# Patient Record
Sex: FEMALE | Race: WHITE | NOT HISPANIC OR LATINO | Employment: PART TIME | URBAN - NONMETROPOLITAN AREA
[De-identification: names, ages, dates, MRNs, and addresses within clinical notes are randomized per-mention and may not be internally consistent; named-entity substitution may affect disease eponyms.]

---

## 2017-08-10 ENCOUNTER — ALLSCRIPTS OFFICE VISIT (OUTPATIENT)
Dept: FAMILY MEDICINE CLINIC | Facility: CLINIC | Age: 27
End: 2017-08-10

## 2017-08-10 PROCEDURE — T1015 CLINIC SERVICE: HCPCS | Performed by: FAMILY MEDICINE

## 2018-01-14 VITALS
WEIGHT: 237 LBS | HEIGHT: 65 IN | DIASTOLIC BLOOD PRESSURE: 78 MMHG | RESPIRATION RATE: 16 BRPM | OXYGEN SATURATION: 99 % | HEART RATE: 86 BPM | TEMPERATURE: 96.7 F | SYSTOLIC BLOOD PRESSURE: 120 MMHG | BODY MASS INDEX: 39.49 KG/M2

## 2018-05-20 ENCOUNTER — OFFICE VISIT (OUTPATIENT)
Dept: URGENT CARE | Facility: CLINIC | Age: 28
End: 2018-05-20

## 2018-05-20 VITALS
RESPIRATION RATE: 18 BRPM | HEART RATE: 81 BPM | DIASTOLIC BLOOD PRESSURE: 93 MMHG | OXYGEN SATURATION: 100 % | TEMPERATURE: 97.8 F | SYSTOLIC BLOOD PRESSURE: 161 MMHG

## 2018-05-20 DIAGNOSIS — F41.9 ANXIETY: ICD-10-CM

## 2018-05-20 DIAGNOSIS — R20.2 PARESTHESIA OF RIGHT ARM: Primary | ICD-10-CM

## 2018-05-20 PROCEDURE — 99213 OFFICE O/P EST LOW 20 MIN: CPT | Performed by: FAMILY MEDICINE

## 2018-05-20 NOTE — PATIENT INSTRUCTIONS
The patient was given a referral to her primary care doctor  No was given to him also on the behalf of the patient    Patient will see primary care regarding her anxiety and also to review the x-rays done at the local hospital

## 2018-05-20 NOTE — PROGRESS NOTES
[facility] - Patient Visit Note  Neelima Rosen 32 y o  female MRN: [id number]      Assessment / Plan:  Paresthesia of right arm [R20 2]  1  Paresthesia of right arm     2  Anxiety       Reason For Visit / Chief Complaint  Chief Complaint   Patient presents with    Numbness     right arm numbness    [facility] Discussion:  Patient is referred back to her family doctor regarding her questions about her lumbar x-rays and having anxiety and panic attacks  HPI:  Neelima Rosen is a 32 y o  female Patient           Who  Presents with a history of having been in her vehicle at which time she developed numbness along the ulnar nerve distribution of her right upper extremity  Once she was able to move the arm freely the paresthesias went away and had not returned since that time  During this episode she felt that she was having a heart attack and became so anxious that she hyperventilated and had a feeling throughout her whole body as though she was covered with "menthol"  She also had questions about a lumbar x-ray that was performed when she was seen at the local hospital for back pain and was told by the practitioner that she had plaquing in her arteries  It was explained to her that some plaquing occurs even at birth and that she should discuss this with her primary care doctor who can in fact over the 6 raise with her  The patient was also instructed that she is probably having a high anxiety level over many things  Historical Information   No past medical history on file  No past surgical history on file  Social History   History   Alcohol use Not on file     History   Drug use: Unknown     History   Smoking Status    Not on file   Smokeless Tobacco    Not on file     No family history on file        ALLERGIES:       No Known Allergies    MEDS:  No current outpatient prescriptions on file      FACILITY ADMINISTERED MEDS:        REVIEW OF SYSTEMS    GENERAL: NEGATIVE for:  Generalized Fatigue                             Chills                              Fever                             Myalgias     OPTHALMIC: NEGATIVE for:  Diplopia                            Scotomata                            Visual Changes                            Blurred Vision     ENT:  EARS NEGATIVE for:  Hearing Difficulty                            Tinnitus                            Vertigo                            Dizziness                            Ear Pain                            Ear Drainage               NOSE NEGATIVE for:  Nasal Congestion                            Nasal Discharge                            Sinus Pain / Pressure               THROAT NEGATIVE for:  Sore Throat / Throat Pain                            Difficulty Swallowing     RESPIRATORY: NEGATIVE for:  Cough                            Wheezing                            Sputum Production                            Sob / Tachypnea                            Hemoptysis     CARDIOVASCULAR: NEGATIVE for:  Chest Pain                             SOB (cardiac Related)                             Dyspnea on Exertion                             Orthopnea                             PND                             Leg Edema                             Palpitations                               Irregularities/rythym             ABDOMINAL/GI:          CURRENT VITALS:   Blood Pressure: 161/93 (05/20/18 1124)  Pulse: 81 (05/20/18 1124)  Temperature: 97 8 °F (36 6 °C) (05/20/18 1124)  Respirations: 18 (05/20/18 1124)  SpO2: 100 % (05/20/18 1124)  /93   Pulse 81   Temp 97 8 °F (36 6 °C)   Resp 18   SpO2 100%       PHYSICAL EXAM:         General Appearance:    Alert, cooperative, no apparent distress, appears stated age     Oriented x3    Head:    Normocephalic, without obvious abnormality, atraumatic   Eyes:      EOM's intact,      LAUREL,        conjunctiva/corneas clear,          fundi not visualized well   Ears:     Normal external ear canals     Tm right side  Normal     Tm left side    Normal       Nose:   Nares normal externally, septum midline,     mucosa normal,     No anterior drainage         Sinuses   with out   tenderness to palpation / percussion     Throat:   Lips, mucosa, and tongue normal       Anterior pharynx   Normal      Posterior pharynx   Normal      No exudate obvious       Neck:   Supple, symmetrical, trachea midline and moveable    Normal thyroid click present    No carotid bruits appreciated        Lymphatics:     Adenopathy in anterior cervical chain  Normal    Adenopathy in posterior cervical chain   Normal     Lungs:     Clear to auscultation bilaterally    No rales    No ronchi    No wheeze     Heart[de-identified]    Regular rate and rhythm, S1 and S2 normal,     No S3, S4, audible    No murmurs, rubs      Extremities:     Extremities grossly normal     atraumatic,     no cyanosis or edema  I could not produce paresthesias by pressure over the ulnar or radial nerve at this time  Tinnel"s sign was negative  Skin:     Skin color, texture, turgor normal, no rashes or lesions               Neurologic   CNII-XII intact,     Motor strength in upper and lower ext  Normal and symmetrical ,     Gross sensory intact          Follow up at primary care in 2  days    Counseling / Coordination of Care  Total clinic time spent today  15  minutes  Greater than 50% of total time was spent with the patient and / or family counseling and / or coordination of care  Portions of the record may have been created with voice recognition software   Occasional wrong word or "sound a like" substitutions may have occurred due to the inherent limitations of voice recognition software   Read the chart carefully and recognize, using context, where substitutions have occurred

## 2018-07-10 ENCOUNTER — HOSPITAL ENCOUNTER (EMERGENCY)
Facility: HOSPITAL | Age: 28
Discharge: HOME | End: 2018-07-10
Attending: EMERGENCY MEDICINE | Admitting: EMERGENCY MEDICINE

## 2018-07-10 VITALS
RESPIRATION RATE: 16 BRPM | TEMPERATURE: 98.4 F | BODY MASS INDEX: 40.18 KG/M2 | OXYGEN SATURATION: 98 % | WEIGHT: 250 LBS | HEART RATE: 78 BPM | HEIGHT: 66 IN | DIASTOLIC BLOOD PRESSURE: 80 MMHG | SYSTOLIC BLOOD PRESSURE: 128 MMHG

## 2018-07-10 DIAGNOSIS — R11.0 NAUSEA: Primary | ICD-10-CM

## 2018-07-10 DIAGNOSIS — R19.7 DIARRHEA, UNSPECIFIED TYPE: ICD-10-CM

## 2018-07-10 LAB
ALBUMIN SERPL BCP-MCNC: 4.7 G/DL (ref 3.5–5.7)
ALP SERPL-CCNC: 43 U/L (ref 40–150)
ALT SERPL W P-5'-P-CCNC: 16 U/L (ref 7–52)
ANION GAP SERPL CALCULATED.3IONS-SCNC: 6 MMOL/L (ref 4–13)
AST SERPL W P-5'-P-CCNC: 16 U/L (ref 13–39)
ATRIAL RATE: 63 BPM
BASOPHILS # BLD AUTO: 0 THOUSANDS/ΜL (ref 0–0.1)
BASOPHILS NFR BLD AUTO: 0 % (ref 0–2)
BILIRUB SERPL-MCNC: 0.3 MG/DL (ref 0.2–1)
BILIRUB UR QL STRIP: NEGATIVE
BUN SERPL-MCNC: 12 MG/DL (ref 7–25)
CALCIUM SERPL-MCNC: 10 MG/DL (ref 8.6–10.5)
CHLORIDE SERPL-SCNC: 103 MMOL/L (ref 98–107)
CLARITY UR: ABNORMAL
CO2 SERPL-SCNC: 30 MMOL/L (ref 21–31)
COLOR UR: YELLOW
CREAT SERPL-MCNC: 0.83 MG/DL (ref 0.6–1.2)
EOSINOPHIL # BLD AUTO: 0.1 THOUSAND/ΜL (ref 0–0.61)
EOSINOPHIL NFR BLD AUTO: 1 % (ref 0–5)
ERYTHROCYTE [DISTWIDTH] IN BLOOD BY AUTOMATED COUNT: 12.9 % (ref 11.5–14.5)
EXT PREG TEST URINE: NORMAL
GFR SERPL CREATININE-BSD FRML MDRD: 96 ML/MIN/1.73SQ M
GLUCOSE SERPL-MCNC: 106 MG/DL (ref 65–99)
GLUCOSE UR STRIP-MCNC: NEGATIVE MG/DL
HCT VFR BLD AUTO: 39.3 % (ref 34.8–46.1)
HGB BLD-MCNC: 13.5 G/DL (ref 12–16)
HGB UR QL STRIP.AUTO: NEGATIVE
KETONES UR STRIP-MCNC: NEGATIVE MG/DL
LEUKOCYTE ESTERASE UR QL STRIP: NEGATIVE
LIPASE SERPL-CCNC: 13 U/L (ref 11–82)
LYMPHOCYTES # BLD AUTO: 1.3 THOUSANDS/ΜL (ref 0.6–4.47)
LYMPHOCYTES NFR BLD AUTO: 25 % (ref 21–51)
MCH RBC QN AUTO: 30 PG (ref 26–34)
MCHC RBC AUTO-ENTMCNC: 34.3 G/DL (ref 31–37)
MCV RBC AUTO: 87 FL (ref 81–99)
MONOCYTES # BLD AUTO: 0.4 THOUSAND/ΜL (ref 0.17–1.22)
MONOCYTES NFR BLD AUTO: 7 % (ref 2–12)
NEUTROPHILS # BLD AUTO: 3.6 THOUSANDS/ΜL (ref 1.4–6.5)
NEUTS SEG NFR BLD AUTO: 67 % (ref 42–75)
NITRITE UR QL STRIP: NEGATIVE
NRBC BLD AUTO-RTO: 0 /100 WBCS
P AXIS: 32 DEGREES
PH UR STRIP.AUTO: 6.5 [PH] (ref 5–8)
PLATELET # BLD AUTO: 280 THOUSANDS/UL (ref 149–390)
PMV BLD AUTO: 7 FL (ref 8.6–11.7)
POTASSIUM SERPL-SCNC: 4 MMOL/L (ref 3.5–5.5)
PR INTERVAL: 152 MS
PROT SERPL-MCNC: 7.6 G/DL (ref 6.4–8.9)
PROT UR STRIP-MCNC: NEGATIVE MG/DL
QRS AXIS: 40 DEGREES
QRSD INTERVAL: 76 MS
QT INTERVAL: 394 MS
QTC INTERVAL: 403 MS
RBC # BLD AUTO: 4.49 MILLION/UL (ref 3.9–5.2)
SODIUM SERPL-SCNC: 139 MMOL/L (ref 134–143)
SP GR UR STRIP.AUTO: 1.02 (ref 1–1.03)
T WAVE AXIS: 41 DEGREES
TROPONIN I SERPL-MCNC: <0.03 NG/ML
UROBILINOGEN UR QL STRIP.AUTO: 0.2 E.U./DL
VENTRICULAR RATE: 63 BPM
WBC # BLD AUTO: 5.4 THOUSAND/UL (ref 4.8–10.8)

## 2018-07-10 PROCEDURE — 81025 URINE PREGNANCY TEST: CPT | Performed by: EMERGENCY MEDICINE

## 2018-07-10 PROCEDURE — 85025 COMPLETE CBC W/AUTO DIFF WBC: CPT | Performed by: EMERGENCY MEDICINE

## 2018-07-10 PROCEDURE — 99285 EMERGENCY DEPT VISIT HI MDM: CPT

## 2018-07-10 PROCEDURE — 93005 ELECTROCARDIOGRAM TRACING: CPT

## 2018-07-10 PROCEDURE — 96374 THER/PROPH/DIAG INJ IV PUSH: CPT

## 2018-07-10 PROCEDURE — 81003 URINALYSIS AUTO W/O SCOPE: CPT | Performed by: EMERGENCY MEDICINE

## 2018-07-10 PROCEDURE — 96376 TX/PRO/DX INJ SAME DRUG ADON: CPT

## 2018-07-10 PROCEDURE — 80053 COMPREHEN METABOLIC PANEL: CPT | Performed by: EMERGENCY MEDICINE

## 2018-07-10 PROCEDURE — 96375 TX/PRO/DX INJ NEW DRUG ADDON: CPT

## 2018-07-10 PROCEDURE — 36415 COLL VENOUS BLD VENIPUNCTURE: CPT | Performed by: EMERGENCY MEDICINE

## 2018-07-10 PROCEDURE — 84484 ASSAY OF TROPONIN QUANT: CPT | Performed by: EMERGENCY MEDICINE

## 2018-07-10 PROCEDURE — 83690 ASSAY OF LIPASE: CPT | Performed by: EMERGENCY MEDICINE

## 2018-07-10 RX ORDER — ONDANSETRON 2 MG/ML
4 INJECTION INTRAMUSCULAR; INTRAVENOUS ONCE
Status: COMPLETED | OUTPATIENT
Start: 2018-07-10 | End: 2018-07-10

## 2018-07-10 RX ORDER — ACETAMINOPHEN 325 MG/1
650 TABLET ORAL ONCE
Status: COMPLETED | OUTPATIENT
Start: 2018-07-10 | End: 2018-07-10

## 2018-07-10 RX ORDER — ONDANSETRON 4 MG/1
4 TABLET, FILM COATED ORAL EVERY 8 HOURS PRN
Qty: 10 TABLET | Refills: 0 | Status: SHIPPED | OUTPATIENT
Start: 2018-07-10 | End: 2018-11-15

## 2018-07-10 RX ADMIN — ONDANSETRON 4 MG: 2 INJECTION INTRAMUSCULAR; INTRAVENOUS at 10:58

## 2018-07-10 RX ADMIN — SODIUM CHLORIDE 1000 ML: 0.9 INJECTION, SOLUTION INTRAVENOUS at 13:20

## 2018-07-10 RX ADMIN — ACETAMINOPHEN 650 MG: 325 TABLET ORAL at 14:24

## 2018-07-10 RX ADMIN — SODIUM CHLORIDE 1000 ML: 0.9 INJECTION, SOLUTION INTRAVENOUS at 10:59

## 2018-07-10 RX ADMIN — ONDANSETRON 4 MG: 2 INJECTION INTRAMUSCULAR; INTRAVENOUS at 13:20

## 2018-07-10 NOTE — DISCHARGE INSTRUCTIONS
Acute Diarrhea   WHAT YOU NEED TO KNOW:   Acute diarrhea starts quickly and lasts a short time, usually 1 to 3 days  It can last up to 2 weeks  You may not be able to control your diarrhea  Acute diarrhea usually stops on its own  DISCHARGE INSTRUCTIONS:   Return to the emergency department if:   · You feel confused  · Your heartbeat is faster than normal      · Your eyes look deeply sunken, or you have no tears when you cry  · You urinate less than usual, or your urine is dark yellow  · You have blood or mucus in your stools  · You have severe abdominal pain  · You are unable to drink any liquids  Contact your healthcare provider if:   · Your symptoms do not get better with treatment  · You have a fever higher than 101 3°F (38 5°C)  · You have trouble eating and drinking because you are vomiting  · You are thirsty or have a dry mouth  · Your diarrhea does not get better in 7 days  · You have questions or concerns about your condition or care  Follow up with your healthcare provider as directed:  Write down your questions so you remember to ask them during your visits  Medicines:  · Diarrhea medicine  is an over-the-counter medicine that helps slow or stop your diarrhea  If you take other medicines, talk to your healthcare provider before you take diarrhea medicine  · Antibiotics  may be given to help treat an infection caused by bacteria  · Antiparasitics  may be given to treat an infection caused by parasites  · Take your medicine as directed  Contact your healthcare provider if you think your medicine is not helping or if you have side effects  Tell him of her if you are allergic to any medicine  Keep a list of the medicines, vitamins, and herbs you take  Include the amounts, and when and why you take them  Bring the list or the pill bottles to follow-up visits  Carry your medicine list with you in case of an emergency    Self-care:   · Drink liquids as directed  Liquids will help prevent dehydration caused by diarrhea  Ask your healthcare provider how much liquid to drink each day and which liquids are best for you  You may need to drink an oral rehydration solution (ORS)  An ORS has the right amounts of water, salts, and sugar you need to replace body fluids  You can buy an ORS at most grocery stores and pharmacies  · Eat foods that are easy to digest   Examples include rice, lentils, cereal, bananas, potatoes, and bread  It also includes some fruits (bananas, melon), well-cooked vegetables, and lean meats  Avoid foods high in fiber, fat, and sugar  Also avoid caffeine, alcohol, dairy, and red meat until your diarrhea is gone  Prevent acute diarrhea:   · Wash your hands often  Use soap and water  Wash your hands before you eat or prepare food  Also wash your hands after you use the bathroom  Use an alcohol-based hand gel when soap and water are not available  · Keep bathroom surfaces clean  This helps prevent the spread of germs that cause acute diarrhea  · Wash fruits and vegetables well before you eat them  This can help remove germs that cause diarrhea  If possible, remove the skin from fruits and vegetables, or cook them well before you eat them  · Cook meat as directed  ¨ Cook ground meat  to 160°F      ¨ Cook ground poultry, whole poultry, or cuts of poultry  to at least 165°F  Remove the meat from heat  Let it stand for 3 minutes before you eat it  ¨ Cook whole cuts of meat other than poultry  to at least 145°F  Remove the meat from heat  Let it stand for 3 minutes before you eat it  · Wash dishes that have touched raw meat with hot water and soap  This includes cutting boards, utensils, dishes, and serving containers  · Place raw or cooked meat in the refrigerator as soon as possible  Bacteria can grow in meat that is left at room temperature too long  · Do not eat raw or undercooked oysters, clams, or mussels  These foods may be contaminated and cause infection  · Drink filtered or treated water only when you travel  Do not put ice in your drinks  Drink bottled water whenever possible  © 2017 [employer]  Information is for End User's use only and may not be sold, redistributed or otherwise used for commercial purposes  All illustrations and images included in CareNotes® are the copyrighted property of [employer]  or [employer]  The above information is an  only  It is not intended as medical advice for individual conditions or treatments  Talk to your doctor, nurse or pharmacist before following any medical regimen to see if it is safe and effective for you  Acute Nausea and Vomiting   WHAT YOU NEED TO KNOW:   Acute nausea and vomiting start suddenly, worsen quickly, and last a short time  DISCHARGE INSTRUCTIONS:   Return to the emergency department if:   · You see blood in your vomit or your bowel movements  · You have sudden, severe pain in your chest and upper abdomen after hard vomiting or retching  · You have swelling in your neck and chest      · You are dizzy, cold, and thirsty and your eyes and mouth are dry  · You are urinating very little or not at all  · You have muscle weakness, leg cramps, and trouble breathing  · Your heart is beating much faster than normal      · You continue to vomit for more than 48 hours  Contact your healthcare provider if:   · You have frequent dry heaves (vomiting but nothing comes out)  · Your nausea and vomiting does not get better or go away after you use medicine  · You have questions or concerns about your condition or treatment  Medicines: You may need any of the following:  · Medicines  may be given to calm your stomach and stop your vomiting  You may also need medicines to help you feel more relaxed or to stop nausea and vomiting caused by motion sickness      · Gastrointestinal stimulants  are used to help empty your stomach and bowels  This may help decrease nausea and vomiting  · Take your medicine as directed  Contact your healthcare provider if you think your medicine is not helping or if you have side effects  Tell him or her if you are allergic to any medicine  Keep a list of the medicines, vitamins, and herbs you take  Include the amounts, and when and why you take them  Bring the list or the pill bottles to follow-up visits  Carry your medicine list with you in case of an emergency  Prevent or manage acute nausea and vomiting:   · Do not drink alcohol  Alcohol may upset or irritate your stomach  Too much alcohol can also cause acute nausea and vomiting  · Control stress  Headaches due to stress may cause nausea and vomiting  Find ways to relax and manage your stress  Get more rest and sleep  · Drink more liquids as directed  Vomiting can lead to dehydration  It is important to drink more liquids to help replace lost body fluids  Ask your healthcare provider how much liquid to drink each day and which liquids are best for you  Your provider may recommend that you drink an oral rehydration solution (ORS)  ORS contains water, salts, and sugar that are needed to replace the lost body fluids  Ask what kind of ORS to use, how much to drink, and where to get it  · Eat smaller meals, more often  Eat small amounts of food every 2 to 3 hours, even if you are not hungry  Food in your stomach may decrease your nausea  · Talk to your healthcare provider before you take over-the-counter (OTC) medicines  These medicines can cause serious problems if you use certain other medicines, or you have a medical condition  You may have problems if you use too much or use them for longer than the label says  Follow directions on the label carefully  Follow up with your healthcare provider as directed:  Write down your questions so you remember to ask them during your follow-up visits    © 2017 [facility] [address] is for End User's use only and may not be sold, redistributed or otherwise used for commercial purposes  All illustrations and images included in CareNotes® are the copyrighted property of [employer]  or [employer]  The above information is an  only  It is not intended as medical advice for individual conditions or treatments  Talk to your doctor, nurse or pharmacist before following any medical regimen to see if it is safe and effective for you

## 2018-07-10 NOTE — ED PROVIDER NOTES
History  Chief Complaint   Patient presents with    Weakness - Generalized    Anorexia    Diarrhea     29 yr female with c/o progressive generalized weakness x4 days with constant nausea and diarrhea 4-5 times today  No emesis but unable take adequate p o  intake  No fever chills  No melena or hematochezia  No cough or congestion, no sore throat  No chest pain or shortness of breath  No dysuria  Patient also states she had intermittent palpitations        History provided by:  Patient  Diarrhea   Quality:  Watery  Severity:  Moderate  Onset quality:  Sudden  Duration:  4 days  Timing:  Intermittent  Progression:  Unchanged  Relieved by:  Nothing  Worsened by:  Nothing  Associated symptoms: no abdominal pain, no chills, no recent cough, no fever, no headaches, no myalgias, no URI and no vomiting    Risk factors: no recent antibiotic use, no sick contacts and no travel to endemic areas        None       Past Medical History:   Diagnosis Date    Anemia     Post partum depression        Past Surgical History:   Procedure Laterality Date    APPENDECTOMY       SECTION       SECTION         Family History   Problem Relation Age of Onset    Barnsdall's disease Family      I have reviewed and agree with the history as documented  Social History   Substance Use Topics    Smoking status: Never Smoker    Smokeless tobacco: Never Used    Alcohol use No        Review of Systems   Constitutional: Negative for chills and fever  HENT: Negative for rhinorrhea and sore throat  Eyes: Negative for visual disturbance  Respiratory: Negative for cough and shortness of breath  Cardiovascular: Negative for chest pain and leg swelling  Gastrointestinal: Positive for diarrhea and nausea  Negative for abdominal pain and vomiting  Genitourinary: Negative for dysuria and frequency  Musculoskeletal: Negative for back pain and myalgias  Skin: Negative for rash     Neurological: Positive for weakness (Generalized)  Negative for dizziness and headaches  Psychiatric/Behavioral: Negative for confusion  All other systems reviewed and are negative        Physical Exam  Physical Exam    Vital Signs  ED Triage Vitals [07/10/18 0955]   Temperature Pulse Respirations Blood Pressure SpO2   98 1 °F (36 7 °C) 99 18 149/88 94 %      Temp Source Heart Rate Source Patient Position - Orthostatic VS BP Location FiO2 (%)   Temporal Monitor Sitting Left arm --      Pain Score       5           Vitals:    07/10/18 1041 07/10/18 1045 07/10/18 1425 07/10/18 1523   BP: 126/84 124/82 121/75 128/80   Pulse: 84  79 78   Patient Position - Orthostatic VS: Standing - Orthostatic VS  Sitting        Visual Acuity      ED Medications  Medications   sodium chloride 0 9 % bolus 1,000 mL (0 mL Intravenous Stopped 7/10/18 1159)   ondansetron (ZOFRAN) injection 4 mg (4 mg Intravenous Given 7/10/18 1058)   ondansetron (ZOFRAN) injection 4 mg (4 mg Intravenous Given 7/10/18 1320)   sodium chloride 0 9 % bolus 1,000 mL (0 mL Intravenous Stopped 7/10/18 1532)   acetaminophen (TYLENOL) tablet 650 mg (650 mg Oral Given 7/10/18 1424)       Diagnostic Studies  Results Reviewed     Procedure Component Value Units Date/Time    Troponin I [[id number]]  (Normal) Collected:  07/10/18 1050    Lab Status:  Final result Specimen:  Blood from Arm, Left Updated:  07/10/18 1121     Troponin I <0 03 ng/mL     Comprehensive metabolic panel [[id number]]  (Abnormal) Collected:  07/10/18 1050    Lab Status:  Final result Specimen:  Blood from Arm, Left Updated:  07/10/18 1117     Sodium 139 mmol/L      Potassium 4 0 mmol/L      Chloride 103 mmol/L      CO2 30 mmol/L      Anion Gap 6 mmol/L      BUN 12 mg/dL      Creatinine 0 83 mg/dL      Glucose 106 (H) mg/dL      Calcium 10 0 mg/dL      AST 16 U/L      ALT 16 U/L      Alkaline Phosphatase 43 U/L      Total Protein 7 6 g/dL      Albumin 4 7 g/dL      Total Bilirubin 0 30 mg/dL      eGFR 96 ml/min/1 73sq m Narrative:         National Kidney Disease Education Program recommendations are as follows:  GFR calculation is accurate only with a steady state creatinine  Chronic Kidney disease less than 60 ml/min/1 73 sq  meters  Kidney failure less than 15 ml/min/1 73 sq  meters      Lipase [[id number]]  (Normal) Collected:  07/10/18 1050    Lab Status:  Final result Specimen:  Blood from Arm, Left Updated:  07/10/18 1117     Lipase 13 u/L     UA w Reflex to Microscopic w Reflex to Culture [[id number]]  (Abnormal) Collected:  07/10/18 1049    Lab Status:  Final result Specimen:  Urine from Urine, Clean Catch Updated:  07/10/18 1105     Color, UA Yellow     Clarity, UA Slightly Cloudy (A)     Specific Gravity, UA 1 025     pH, UA 6 5     Leukocytes, UA Negative     Nitrite, UA Negative     Protein, UA Negative mg/dl      Glucose, UA Negative mg/dl      Ketones, UA Negative mg/dl      Urobilinogen, UA 0 2 E U /dl      Bilirubin, UA Negative     Blood, UA Negative    CBC and differential [[id number]]  (Abnormal) Collected:  07/10/18 1050    Lab Status:  Final result Specimen:  Blood from Arm, Left Updated:  07/10/18 1104     WBC 5 40 Thousand/uL      RBC 4 49 Million/uL      Hemoglobin 13 5 g/dL      Hematocrit 39 3 %      MCV 87 fL      MCH 30 0 pg      MCHC 34 3 g/dL      RDW 12 9 %      MPV 7 0 (L) fL      Platelets 379 Thousands/uL      nRBC 0 /100 WBCs      Neutrophils Relative 67 %      Lymphocytes Relative 25 %      Monocytes Relative 7 %      Eosinophils Relative 1 %      Basophils Relative 0 %      Neutrophils Absolute 3 60 Thousands/µL      Lymphocytes Absolute 1 30 Thousands/µL      Monocytes Absolute 0 40 Thousand/µL      Eosinophils Absolute 0 10 Thousand/µL      Basophils Absolute 0 00 Thousands/µL     POCT pregnancy, urine [[id number]]  (Normal) Resulted:  07/10/18 1045    Lab Status:  Final result Updated:  07/10/18 1050     EXT PREG TEST UR (Ref: Negative) negitive                 No orders to display Procedures  Procedures       Phone Contacts  ED Phone Contact    ED Course  ED Course as of Jul 10 1557   Tue Jul 10, 2018   1107 NSR 65bpm, nl QRS, NSSTTW changes    1420 Pt was nauseous after initial zofran dose and IVF - 2nd dose given  Also given tylenol for new HA while in ED    1440 Pt feels much better  D/c after IVF finish                                MDM  CritCare Time    Disposition  Final diagnoses:   Nausea   Diarrhea, unspecified type     Time reflects when diagnosis was documented in both MDM as applicable and the Disposition within this note     Time User Action Codes Description Comment    7/10/2018  2:41 PM Ricard Moritz A Add [R11 0] Nausea     7/10/2018  2:41 PM Ricard Moritz A Add [R19 7] Diarrhea, unspecified type       ED Disposition     ED Disposition Condition Comment    Discharge  Javi Danielahoda discharge to home/self care  Condition at discharge: Stable        Follow-up Information     Follow up With Specialties Details Why [address], [address] [facility], Nurse Practitioner Schedule an appointment as soon as possible for a visit in 1 day Return if she worsens or any new problems occur  [address]            Discharge Medication List as of 7/10/2018  2:42 PM      START taking these medications    Details   ondansetron (ZOFRAN) 4 mg tablet Take 1 tablet (4 mg total) by mouth every 8 (eight) hours as needed for nausea or vomiting for up to 10 doses, Starting Tue 7/10/2018, Print           No discharge procedures on file      ED Provider  Electronically Signed by           Guillermo Hicks MD  07/10/18 1374

## 2018-07-14 ENCOUNTER — HOSPITAL ENCOUNTER (EMERGENCY)
Facility: HOSPITAL | Age: 28
Discharge: HOME | End: 2018-07-15
Attending: EMERGENCY MEDICINE | Admitting: EMERGENCY MEDICINE

## 2018-07-14 DIAGNOSIS — R10.9 ABDOMINAL PAIN: Primary | ICD-10-CM

## 2018-07-15 VITALS
DIASTOLIC BLOOD PRESSURE: 75 MMHG | TEMPERATURE: 97.8 F | HEART RATE: 88 BPM | RESPIRATION RATE: 16 BRPM | WEIGHT: 250 LBS | BODY MASS INDEX: 40.18 KG/M2 | HEIGHT: 66 IN | OXYGEN SATURATION: 100 % | SYSTOLIC BLOOD PRESSURE: 120 MMHG

## 2018-07-15 PROCEDURE — 99284 EMERGENCY DEPT VISIT MOD MDM: CPT

## 2018-07-15 NOTE — DISCHARGE INSTRUCTIONS

## 2018-07-15 NOTE — ED PROVIDER NOTES
History  Chief Complaint   Patient presents with    Abdominal Pain     Pt seen in this ER on Tues for similar s/x and Dx with "off season viral bug" and states she was told to come back if she wasn't feeling better in a few days  Pt did not f/u with PCP  Pt c/o epigastric pain, LT side pain,loss of appetite and body aches  29 YOF WITH RECENT EVALUATION FOR ABDOMINAL PAIN NOW FOR RE-EVALUATION AFTER ONGOING LOSS OR APPETITE AND URINARY FREQUENCY  THE PATIENT IS UNUSUALLY CONCERNED BECAUSE OF HER LOSS OF APPETITE  NO NVD  NO DYSURIA  NO VAGIANL BLEEDING OR DC   OCC [facility] ABDOMINAL PAIN NO COUGH, SORE THROAT, OR RHINORRHEA            Prior to Admission Medications   Prescriptions Last Dose Informant Patient Reported? Taking?   ondansetron (ZOFRAN) 4 mg tablet   No No   Sig: Take 1 tablet (4 mg total) by mouth every 8 (eight) hours as needed for nausea or vomiting for up to 10 doses      Facility-Administered Medications: None       Past Medical History:   Diagnosis Date    Anemia     Post partum depression        Past Surgical History:   Procedure Laterality Date    APPENDECTOMY       SECTION         Family History   Problem Relation Age of Onset    Fisher's disease Family      I have reviewed and agree with the history as documented  Social History   Substance Use Topics    Smoking status: Former Smoker     Quit date: 2017    Smokeless tobacco: Never Used    Alcohol use No        Review of Systems   Constitutional: Negative for chills and fever  HENT: Negative for ear pain, rhinorrhea and sore throat  Eyes: Negative for pain, redness and visual disturbance  Respiratory: Negative for cough and shortness of breath  Cardiovascular: Negative for chest pain and leg swelling  Gastrointestinal: Positive for abdominal pain  Negative for diarrhea, nausea and vomiting  Genitourinary: Negative for dysuria, flank pain and urgency     Musculoskeletal: Negative for back pain, myalgias and neck pain  Skin: Negative for rash  Neurological: Negative for dizziness, weakness, light-headedness and headaches  Hematological: Negative  Psychiatric/Behavioral: Negative for agitation, confusion and suicidal ideas  The patient is not nervous/anxious  All other systems reviewed and are negative  Physical Exam  Physical Exam   Constitutional: She is oriented to person, place, and time  She appears well-developed and well-nourished  HENT:   Nose: Nose normal    Mouth/Throat: Oropharynx is clear and moist  No oropharyngeal exudate  Eyes: Conjunctivae and EOM are normal  Pupils are equal, round, and reactive to light  No scleral icterus  Neck: Normal range of motion  Neck supple  No JVD present  No tracheal deviation present  Cardiovascular: Normal rate, regular rhythm and normal heart sounds  No murmur heard  Pulmonary/Chest: Effort normal and breath sounds normal  No respiratory distress  She has no wheezes  She has no rales  Abdominal: Soft  Bowel sounds are normal  There is no tenderness  There is no guarding  Musculoskeletal: Normal range of motion  She exhibits no edema or tenderness  Neurological: She is alert and oriented to person, place, and time  No cranial nerve deficit or sensory deficit  She exhibits normal muscle tone  5/5 motor, nl sens   Skin: Skin is warm and dry  Psychiatric: She has a normal mood and affect  Her behavior is normal    Nursing note and vitals reviewed        Vital Signs  ED Triage Vitals [07/14/18 2302]   Temperature Pulse Respirations Blood Pressure SpO2   98 1 °F (36 7 °C) 76 18 123/71 100 %      Temp Source Heart Rate Source Patient Position - Orthostatic VS BP Location FiO2 (%)   Temporal Monitor -- Left arm --      Pain Score       6           Vitals:    07/14/18 2302   BP: 123/71   Pulse: 76       Visual Acuity      ED Medications  Medications - No data to display    Diagnostic Studies  Results Reviewed     None No orders to display              Procedures  Procedures       Phone Contacts  ED Phone Contact    ED Course                               MDM  CritCare Time    Disposition  Final diagnoses:   None     ED Disposition     None      Follow-up Information    None         Patient's Medications   Discharge Prescriptions    No medications on file     No discharge procedures on file      ED Provider  Electronically Signed by           Bibiana Cedillo MD  07/15/18 0343

## 2018-07-30 ENCOUNTER — APPOINTMENT (EMERGENCY)
Dept: CT IMAGING | Facility: HOSPITAL | Age: 28
End: 2018-07-30

## 2018-07-30 ENCOUNTER — OFFICE VISIT (OUTPATIENT)
Dept: FAMILY MEDICINE CLINIC | Facility: CLINIC | Age: 28
End: 2018-07-30

## 2018-07-30 ENCOUNTER — OFFICE VISIT (OUTPATIENT)
Dept: URGENT CARE | Facility: CLINIC | Age: 28
End: 2018-07-30

## 2018-07-30 ENCOUNTER — HOSPITAL ENCOUNTER (EMERGENCY)
Facility: HOSPITAL | Age: 28
Discharge: HOME | End: 2018-07-30
Attending: EMERGENCY MEDICINE

## 2018-07-30 VITALS
TEMPERATURE: 97.6 F | WEIGHT: 240 LBS | HEIGHT: 66 IN | BODY MASS INDEX: 38.57 KG/M2 | DIASTOLIC BLOOD PRESSURE: 79 MMHG | OXYGEN SATURATION: 99 % | HEART RATE: 93 BPM | SYSTOLIC BLOOD PRESSURE: 119 MMHG | RESPIRATION RATE: 16 BRPM

## 2018-07-30 VITALS
DIASTOLIC BLOOD PRESSURE: 74 MMHG | SYSTOLIC BLOOD PRESSURE: 162 MMHG | TEMPERATURE: 98.2 F | BODY MASS INDEX: 38.89 KG/M2 | OXYGEN SATURATION: 99 % | HEIGHT: 66 IN | WEIGHT: 242 LBS | HEART RATE: 73 BPM | RESPIRATION RATE: 18 BRPM

## 2018-07-30 VITALS
DIASTOLIC BLOOD PRESSURE: 71 MMHG | RESPIRATION RATE: 18 BRPM | TEMPERATURE: 98.1 F | OXYGEN SATURATION: 100 % | HEART RATE: 76 BPM | SYSTOLIC BLOOD PRESSURE: 165 MMHG

## 2018-07-30 DIAGNOSIS — K21.9 GASTROESOPHAGEAL REFLUX DISEASE, ESOPHAGITIS PRESENCE NOT SPECIFIED: ICD-10-CM

## 2018-07-30 DIAGNOSIS — M43.6 WRY NECK: ICD-10-CM

## 2018-07-30 DIAGNOSIS — K21.9 GASTROESOPHAGEAL REFLUX DISEASE WITHOUT ESOPHAGITIS: ICD-10-CM

## 2018-07-30 DIAGNOSIS — R59.0 SUPRACLAVICULAR ADENOPATHY: Primary | ICD-10-CM

## 2018-07-30 DIAGNOSIS — M54.2 NECK PAIN ON RIGHT SIDE: Primary | ICD-10-CM

## 2018-07-30 LAB
ALBUMIN SERPL BCP-MCNC: 4.4 G/DL (ref 3.5–5.7)
ALP SERPL-CCNC: 37 U/L (ref 40–150)
ALT SERPL W P-5'-P-CCNC: 12 U/L (ref 7–52)
ANION GAP SERPL CALCULATED.3IONS-SCNC: 8 MMOL/L (ref 4–13)
AST SERPL W P-5'-P-CCNC: 13 U/L (ref 13–39)
BASOPHILS # BLD AUTO: 0 THOUSANDS/ΜL (ref 0–0.1)
BASOPHILS NFR BLD AUTO: 1 % (ref 0–2)
BILIRUB SERPL-MCNC: 0.3 MG/DL (ref 0.2–1)
BUN SERPL-MCNC: 13 MG/DL (ref 7–25)
CALCIUM SERPL-MCNC: 9.5 MG/DL (ref 8.6–10.5)
CHLORIDE SERPL-SCNC: 104 MMOL/L (ref 98–107)
CO2 SERPL-SCNC: 27 MMOL/L (ref 21–31)
CREAT SERPL-MCNC: 0.73 MG/DL (ref 0.6–1.2)
EOSINOPHIL # BLD AUTO: 0.1 THOUSAND/ΜL (ref 0–0.61)
EOSINOPHIL NFR BLD AUTO: 2 % (ref 0–5)
ERYTHROCYTE [DISTWIDTH] IN BLOOD BY AUTOMATED COUNT: 13 % (ref 11.5–14.5)
ERYTHROCYTE [SEDIMENTATION RATE] IN BLOOD: 8 MM/HOUR (ref 0–20)
GFR SERPL CREATININE-BSD FRML MDRD: 112 ML/MIN/1.73SQ M
GLUCOSE SERPL-MCNC: 95 MG/DL (ref 65–99)
HCG SERPL QL: NEGATIVE
HCT VFR BLD AUTO: 35.9 % (ref 34.8–46.1)
HGB BLD-MCNC: 12.5 G/DL (ref 12–16)
LYMPHOCYTES # BLD AUTO: 1.6 THOUSANDS/ΜL (ref 0.6–4.47)
LYMPHOCYTES NFR BLD AUTO: 27 % (ref 21–51)
MCH RBC QN AUTO: 30.1 PG (ref 26–34)
MCHC RBC AUTO-ENTMCNC: 34.8 G/DL (ref 31–37)
MCV RBC AUTO: 87 FL (ref 81–99)
MONOCYTES # BLD AUTO: 0.3 THOUSAND/ΜL (ref 0.17–1.22)
MONOCYTES NFR BLD AUTO: 5 % (ref 2–12)
NEUTROPHILS # BLD AUTO: 3.8 THOUSANDS/ΜL (ref 1.4–6.5)
NEUTS SEG NFR BLD AUTO: 65 % (ref 42–75)
NRBC BLD AUTO-RTO: 0 /100 WBCS
PLATELET # BLD AUTO: 262 THOUSANDS/UL (ref 149–390)
PMV BLD AUTO: 6.8 FL (ref 8.6–11.7)
POTASSIUM SERPL-SCNC: 3.8 MMOL/L (ref 3.5–5.5)
PROT SERPL-MCNC: 7.2 G/DL (ref 6.4–8.9)
RBC # BLD AUTO: 4.15 MILLION/UL (ref 3.9–5.2)
SODIUM SERPL-SCNC: 139 MMOL/L (ref 134–143)
TROPONIN I SERPL-MCNC: <0.03 NG/ML
WBC # BLD AUTO: 5.8 THOUSAND/UL (ref 4.8–10.8)

## 2018-07-30 PROCEDURE — 85652 RBC SED RATE AUTOMATED: CPT | Performed by: EMERGENCY MEDICINE

## 2018-07-30 PROCEDURE — 96361 HYDRATE IV INFUSION ADD-ON: CPT

## 2018-07-30 PROCEDURE — T1015 CLINIC SERVICE: HCPCS | Performed by: FAMILY MEDICINE

## 2018-07-30 PROCEDURE — 80053 COMPREHEN METABOLIC PANEL: CPT | Performed by: EMERGENCY MEDICINE

## 2018-07-30 PROCEDURE — 99213 OFFICE O/P EST LOW 20 MIN: CPT | Performed by: FAMILY MEDICINE

## 2018-07-30 PROCEDURE — 84484 ASSAY OF TROPONIN QUANT: CPT | Performed by: EMERGENCY MEDICINE

## 2018-07-30 PROCEDURE — 70491 CT SOFT TISSUE NECK W/DYE: CPT

## 2018-07-30 PROCEDURE — 99284 EMERGENCY DEPT VISIT MOD MDM: CPT

## 2018-07-30 PROCEDURE — 84703 CHORIONIC GONADOTROPIN ASSAY: CPT | Performed by: EMERGENCY MEDICINE

## 2018-07-30 PROCEDURE — 93005 ELECTROCARDIOGRAM TRACING: CPT

## 2018-07-30 PROCEDURE — 96374 THER/PROPH/DIAG INJ IV PUSH: CPT

## 2018-07-30 PROCEDURE — 85025 COMPLETE CBC W/AUTO DIFF WBC: CPT | Performed by: EMERGENCY MEDICINE

## 2018-07-30 PROCEDURE — 36415 COLL VENOUS BLD VENIPUNCTURE: CPT | Performed by: EMERGENCY MEDICINE

## 2018-07-30 RX ORDER — CYCLOBENZAPRINE HCL 10 MG
10 TABLET ORAL 3 TIMES DAILY PRN
Qty: 30 TABLET | Refills: 0 | Status: SHIPPED | OUTPATIENT
Start: 2018-07-30 | End: 2018-11-15

## 2018-07-30 RX ORDER — KETOROLAC TROMETHAMINE 30 MG/ML
30 INJECTION, SOLUTION INTRAMUSCULAR; INTRAVENOUS ONCE
Status: COMPLETED | OUTPATIENT
Start: 2018-07-30 | End: 2018-07-30

## 2018-07-30 RX ORDER — NAPROXEN 500 MG/1
500 TABLET ORAL 2 TIMES DAILY WITH MEALS
Qty: 30 TABLET | Refills: 0 | Status: SHIPPED | OUTPATIENT
Start: 2018-07-30 | End: 2018-11-15 | Stop reason: SDUPTHER

## 2018-07-30 RX ORDER — SODIUM CHLORIDE 9 MG/ML
150 INJECTION, SOLUTION INTRAVENOUS CONTINUOUS
Status: DISCONTINUED | OUTPATIENT
Start: 2018-07-30 | End: 2018-07-31 | Stop reason: HOSPADM

## 2018-07-30 RX ADMIN — IOHEXOL 80 ML: 350 INJECTION, SOLUTION INTRAVENOUS at 20:45

## 2018-07-30 RX ADMIN — SODIUM CHLORIDE 150 ML/HR: 0.9 INJECTION, SOLUTION INTRAVENOUS at 19:50

## 2018-07-30 RX ADMIN — KETOROLAC TROMETHAMINE 30 MG: 30 INJECTION, SOLUTION INTRAMUSCULAR; INTRAVENOUS at 20:59

## 2018-07-30 NOTE — PATIENT INSTRUCTIONS
Thank you for sharing you time constraints with me today  I will be calling Saloni magdaleno but asked if she will see you in her office today and set up diagnostic testing for you  In the meantime will be ordering Flexeril at her pharmacy  Saloni will also be discussing her case with me regarding the possibility of a gastroscope for your constant reflux

## 2018-07-30 NOTE — PROGRESS NOTES
[facility] - Patient Visit Note  Pavel Cruz 29 y o  female MRN: [id number]      Assessment / Plan:  No diagnosis found  Reason For Visit / Chief Complaint  Chief Complaint   Patient presents with    Neck Pain     Pt c/o neck pain since yesterday    Byron Ye Discussion:Discussion:  Patient agrees to the plan of my calling her primary S[facility]child that she can be seen today and diagnostic testing can begin  My suggestions would be an eventual gastroscope but ultrasound and/or CT of the supraclavicular area will probably be done in near future  Patient will be treated with Flexeril for the right sided wry neck also  HPI:  Pavel Cruz is a 29 y o  female Patient           This Patient Presents   This Patient Presents with a multitude of problems  She is complaining of pain in the right neck and has shared that she in fact had right neck several years ago this treated successfully with medication  An last several days she has noted a lump in the supraclavicular area on the right  This is so painful that at times she is nauseated  She complains of being treated for a GI bug 2 weeks ago at the emergency room at which time she was treated for diarrhea but had no vomiting  Presently she has nausea with occasional vomiting  She has a known history of GERD for which she takes Pepcid Tums and Aleve she has never had a gastroscope  Patient denies pregnancy 1st day of last normal menstrual period 3 weeks ago and she was checked at [facility] in 2 weeks ago  This was negative  In the past she has been treated with Flexeril  ALLERGIES:  Allergies as of 07/30/2018    (No Known Allergies)       The following portions of the patient's history were reviewed and updated as appropriate:   Allergies, current medications, past family history, past medical history, past social history, past surgical history, and the problem list     Historical Information   Past Medical History:   Diagnosis Date    Anemia     Post partum depression      Past Surgical History:   Procedure Laterality Date    APPENDECTOMY       SECTION       Social History   History   Alcohol Use No     History   Drug Use    Types: Marijuana     Comment: For sleeping and nausea     History   Smoking Status    Former Smoker    Quit date: 2017   Smokeless Tobacco    Never Used     Family History   Problem Relation Age of Onset    [place]'s disease Family              MEDS:    Current Outpatient Prescriptions:     ondansetron (ZOFRAN) 4 mg tablet, Take 1 tablet (4 mg total) by mouth every 8 (eight) hours as needed for nausea or vomiting for up to 10 doses, Disp: 10 tablet, Rfl: 0    FACILITY ADMINISTERED MEDS:        REVIEW OF SYSTEMS    GENERAL: NEGATIVE for:  Generalized Fatigue                             Chills                              Fever                             Myalgias     OPTHALMIC: NEGATIVE for:  Diplopia                            Scotomata                            Visual Changes                            Blurred Vision     ENT:  EARS NEGATIVE for:  Hearing Difficulty                            Tinnitus                            Vertigo                            Dizziness                            Ear Pain                            Ear Drainage               NOSE NEGATIVE for:  Nasal Congestion                            Nasal Discharge                            Sinus Pain / Pressure               THROAT NEGATIVE for:  Sore Throat / Throat Pain                            Difficulty Swallowing     RESPIRATORY: NEGATIVE for:  Cough                            Wheezing                            Sputum Production                            Sob / Tachypnea                            Hemoptysis     CARDIOVASCULAR: NEGATIVE for:  Chest Pain                             SOB (cardiac Related) Dyspnea on Exertion                             Orthopnea                             PND                             Leg Edema                             Palpitations                               Irregularities/rythym                       CURRENT VITALS:   Blood Pressure: 165/71 (07/30/18 0848)  Pulse: 76 (07/30/18 0848)  Temperature: 98 1 °F (36 7 °C) (07/30/18 0848)  Respirations: 18 (07/30/18 0848)  SpO2: 100 % (07/30/18 0848)  /71   Pulse 76   Temp 98 1 °F (36 7 °C)   Resp 18   SpO2 100%       PHYSICAL EXAM:         General Appearance:    Alert, cooperative, no apparent distress, appears stated age     Oriented x3    Head:    Normocephalic, without obvious abnormality, atraumatic   Eyes:      EOM's intact,      LAUREL,        conjunctiva/corneas clear,          fundi not visualized well   Ears:     Normal external ear canals     Tm right side  Normal     Tm left side    Normal       Nose:   Nares normal externally, septum midline,     mucosa normal,     No anterior drainage         Sinuses   with out   tenderness to palpation / percussion     Throat:   Lips, mucosa, and tongue normal       Anterior pharynx   Normal      Posterior pharynx   Normal        No exudate obvious       Neck:   Supple, symmetrical, trachea midline and moveable    Normal thyroid click present    No carotid bruits appreciated        Lymphatics: There is a 2 5 x 2 centimeter firm supraclavicular mass medially above the right clavicle  This is somewhat tender  The overlying trapezius and muscles of the neck are somewhat sore       Lungs:     Clear to auscultation bilaterally    No rales    No ronchi    No wheeze     Heart[de-identified]    Regular rate and rhythm, S1 and S2 normal,     No S3, S4, audible    No murmurs, rubs      Extremities:     Extremities grossly normal     atraumatic,     no cyanosis or edema        Skin:     Skin color, texture, turgor normal, no rashes or lesions           Abdomen:     Soft, non-tender, bowel sounds active all four quadrants,     no masses, no organomegaly  Examination was chaperoned by Celestino Plaza     Neurologic   CNII-XII intact,     Motor strength in upper and lower ext  Normal and symmetrical ,     Gross sensory intact          Follow up at primary care in 2 or 3 days, or sooner if needed OR pesent to local Emergency Room if symptoms are worsening  Portions of the record may have been created with voice recognition software   Occasional wrong word or "sound a like" substitutions may have occurred due to the inherent limitations of voice recognition software   Read the chart carefully and recognize, using context, where substitutions have occurred

## 2018-07-30 NOTE — PROGRESS NOTES
OFFICE VISIT  Ana Carcamo 29 y o  female MRN: [id number]      Assessment / Plan:  Diagnoses and all orders for this visit:    Supraclavicular adenopathy  -     US head neck soft tissue; Future    Gastroesophageal reflux disease without esophagitis  -     Ambulatory referral to Gastroenterology; Future          Reason For Visit / Chief Complaint  Chief Complaint   Patient presents with    Well Check     mass on neck, painful         HPI:  Ana Carcamo is a 29 y o  female presents today from urgent care  She was seen by urgent care this am for neck pain  She reports a lump to her neck that radiates pain in neck and back , with stiffness  She reports this lump has been there for two months  She has a full time job at [employer], she does not have insurance  She was prescribed flexeril  She has not started  She has known acid reflux, worsening symptoms, using tums more freqeuntly   She will be [facility]     Historical Information   Past Medical History:   Diagnosis Date    Anemia     Post partum depression      Past Surgical History:   Procedure Laterality Date    APPENDECTOMY       SECTION       Social History   History   Alcohol Use No     History   Drug Use    Types: Marijuana     Comment: For sleeping and nausea     History   Smoking Status    Former Smoker    Quit date: 2017   Smokeless Tobacco    Never Used     Family History   Problem Relation Age of Onset    [place]'s disease Family        Meds/Allergies   No Known Allergies    Meds:    Current Outpatient Prescriptions:     cyclobenzaprine (FLEXERIL) 10 mg tablet, Take 1 tablet (10 mg total) by mouth 3 (three) times a day as needed for muscle spasms, Disp: 30 tablet, Rfl: 0    ondansetron (ZOFRAN) 4 mg tablet, Take 1 tablet (4 mg total) by mouth every 8 (eight) hours as needed for nausea or vomiting for up to 10 doses, Disp: 10 tablet, Rfl: 0      REVIEW OF SYSTEMS  Constitutional: negative  Eyes: negative  Ears, nose, mouth, throat, and face: negative  Respiratory: negative  Cardiovascular: negative  Gastrointestinal: negative  Integument/breast: negative  Hematologic/lymphatic: negative  Musculoskeletal:positive for neck pain and stiff joints  Neurological: negative  Endocrine: negative      Current Vitals:   Blood Pressure: 162/74 (07/30/18 0930)  Pulse: 73 (07/30/18 0930)  Temperature: 98 2 °F (36 8 °C) (07/30/18 0930)  Respirations: 18 (07/30/18 0930)  Height: 5' 6" (167 6 cm) (07/30/18 0930)  Weight - Scale: 110 kg (242 lb) (07/30/18 0930)  SpO2: 99 % (07/30/18 0930)  [unfilled]    PHYSICAL EXAMS:  General appearance: alert and oriented, in no acute distress  Head: Normocephalic, without obvious abnormality, atraumatic  Eyes: conjunctivae/corneas clear  PERRL, EOM's intact  Fundi benign  Ears: normal TM's and external ear canals both ears  Nose: Nares normal  Septum midline  Mucosa normal  No drainage or sinus tenderness  Throat: lips, mucosa, and tongue normal; teeth and gums normal  Neck: no adenopathy, no carotid bruit, no JVD, supple, symmetrical, trachea midline and thyroid not enlarged, symmetric, no tenderness/mass/nodules  Lungs: clear to auscultation bilaterally  Heart: regular rate and rhythm, S1, S2 normal, no murmur, click, rub or gallop  Abdomen: soft, non-tender; bowel sounds normal; no masses,  no organomegaly  Pulses: 2+ and symmetric  Lymph nodes: Supraclavicular adenopathy: 2 0cm supraclavcular mass medially above right clavicle  Tender  Neurologic: Grossly normal{YES/NO:20        Follow up at this office in after test results     Counseling / Coordination of Care  Total floor / unit time spent today 20 minutes  Greater than 50% of total time was spent with the patient and / or family counseling and / or coordination of care

## 2018-07-30 NOTE — ED PROVIDER NOTES
History  Chief Complaint   Patient presents with    Syncope     near-syncope at [facility] with shaky legs, SOB  HPI  Near syncope  Patient states she is experiencing pain in her neck right lateral side  She went to urgent care today and they said they felt some sort of a lump she was in line to  her Flexeril at the pharmacy when she became lightheaded and near syncopal episode  She was sweaty at the time and had some palpitations lightheaded and shaky  Subsequently she took some Flexeril which had been prescribed for this seemed to make her sleepy but had no effect on her pain  She describes the pain as being exacerbated with movement of the neck especially with rotation to the left  There is no associated chills or fever or sore throat or earache per se  She states the pain is been pain has been present for 1 month that was getting worse lately  Her last menstrual period is now she denies pregnancy  Her past history includes a  and appendectomy  Prior to Admission Medications   Prescriptions Last Dose Informant Patient Reported? Taking? cyclobenzaprine (FLEXERIL) 10 mg tablet 2018 at Unknown time  No Yes   Sig: Take 1 tablet (10 mg total) by mouth 3 (three) times a day as needed for muscle spasms   ondansetron (ZOFRAN) 4 mg tablet Past Month at Unknown time  No Yes   Sig: Take 1 tablet (4 mg total) by mouth every 8 (eight) hours as needed for nausea or vomiting for up to 10 doses      Facility-Administered Medications: None       Past Medical History:   Diagnosis Date    Anemia     Post partum depression        Past Surgical History:   Procedure Laterality Date    APPENDECTOMY       SECTION         Family History   Problem Relation Age of Onset    Toledo's disease Family      I have reviewed and agree with the history as documented      Social History   Substance Use Topics    Smoking status: Former Smoker     Quit date: 2017    Smokeless tobacco: Never Used    Alcohol use No        Review of Systems   Constitutional: Positive for diaphoresis  Negative for activity change, appetite change, chills, fever and unexpected weight change  HENT: Negative for congestion, dental problem, ear pain, rhinorrhea and sore throat  Eyes: Negative for discharge and visual disturbance  Respiratory: Negative for cough, chest tightness, shortness of breath and wheezing  Cardiovascular: Negative for chest pain, palpitations and leg swelling  Gastrointestinal: Negative for abdominal pain, diarrhea, nausea and vomiting  Endocrine: Negative for cold intolerance, polydipsia and polyuria  Genitourinary: Negative for difficulty urinating, dysuria, frequency and hematuria  Musculoskeletal: Positive for neck pain  Negative for arthralgias, back pain, gait problem, myalgias and neck stiffness  Skin: Negative for color change, pallor and rash  Neurological: Negative for dizziness, syncope, weakness, numbness and headaches  Hematological: Does not bruise/bleed easily  Psychiatric/Behavioral: Negative for agitation and dysphoric mood  The patient is nervous/anxious  Physical Exam  Physical Exam   Constitutional: She is oriented to person, place, and time  She appears well-developed and well-nourished  No distress  HENT:   Head: Normocephalic and atraumatic  Right Ear: External ear normal    Left Ear: External ear normal    Nose: Nose normal    Mouth/Throat: Oropharynx is clear and moist  No oropharyngeal exudate  Patient has some slight bruxism but there is no tenderness of the TMJ joint  No abscess noted upper or lower teeth  No trismus  Eyes: Conjunctivae are normal  Right eye exhibits no discharge  Left eye exhibits no discharge  No scleral icterus  Neck: Normal range of motion  Neck supple  No JVD present  No tracheal deviation present  No thyromegaly present     There possibly was some slight fullness in the right supraclavicular area but no definite mass  There was no pulses or bruits in that area  Cardiovascular: Normal rate, regular rhythm and normal heart sounds  Exam reveals no gallop  No murmur heard  Pulmonary/Chest: Effort normal and breath sounds normal  No stridor  She has no wheezes  She has no rales  Abdominal: Soft  Bowel sounds are normal  She exhibits no mass  There is no tenderness  Musculoskeletal: Normal range of motion  She exhibits no edema, tenderness or deformity  The patient has nearly normal range of motion of her cervical spine  She has pain at the extremes of motion in any plane but it is much worse with rotation of her neck to the left  No neck vein distention or bruits were appreciated  No obvious palpable adenopathy  Lymphadenopathy:     She has no cervical adenopathy  Neurological: She is alert and oriented to person, place, and time  No sensory deficit  Speech clear face symmetrical grossly symmetrical and unremarkable muscle tone and strength  No radicular symptoms down the arms  Upper extremities have equal deep tendon reflexes tone and strength  Skin: Skin is warm and dry  Capillary refill takes less than 2 seconds  No rash noted  She is not diaphoretic  No erythema  No pallor  Psychiatric: She has a normal mood and affect  Her behavior is normal  Thought content normal    Nursing note and vitals reviewed        Vital Signs  ED Triage Vitals [07/30/18 1802]   Temperature Pulse Respirations Blood Pressure SpO2   97 6 °F (36 4 °C) 80 20 145/92 100 %      Temp Source Heart Rate Source Patient Position - Orthostatic VS BP Location FiO2 (%)   Temporal -- Sitting Left arm --      Pain Score       8           Vitals:    07/30/18 1802 07/30/18 2348   BP: 145/92 119/79   Pulse: 80 93   Patient Position - Orthostatic VS: Sitting        Visual Acuity      ED Medications  Medications   iohexol (OMNIPAQUE) 350 MG/ML injection (SINGLE-DOSE) 80 mL (80 mL Intravenous Given 7/30/18 2045)   ketorolac (TORADOL) injection 30 mg (30 mg Intravenous Given 7/30/18 2059)       Diagnostic Studies  Results Reviewed     Procedure Component Value Units Date/Time    Sedimentation rate, automated [[id number]]  (Normal) Collected:  07/30/18 1945    Lab Status:  Final result Specimen:  Blood from Arm, Left Updated:  07/30/18 2044     Sed Rate 8 mm/hour     hCG, qualitative pregnancy [[id number]]  (Normal) Collected:  07/30/18 1945    Lab Status:  Final result Specimen:  Blood from Arm, Left Updated:  07/30/18 2028     Preg, Serum Negative    CMP [[id number]]  (Abnormal) Collected:  07/30/18 1945    Lab Status:  Final result Specimen:  Blood from Arm, Left Updated:  07/30/18 2025     Sodium 139 mmol/L      Potassium 3 8 mmol/L      Chloride 104 mmol/L      CO2 27 mmol/L      Anion Gap 8 mmol/L      BUN 13 mg/dL      Creatinine 0 73 mg/dL      Glucose 95 mg/dL      Calcium 9 5 mg/dL      AST 13 U/L      ALT 12 U/L      Alkaline Phosphatase 37 (L) U/L      Total Protein 7 2 g/dL      Albumin 4 4 g/dL      Total Bilirubin 0 30 mg/dL      eGFR 112 ml/min/1 73sq m     Narrative:         National Kidney Disease Education Program recommendations are as follows:  GFR calculation is accurate only with a steady state creatinine  Chronic Kidney disease less than 60 ml/min/1 73 sq  meters  Kidney failure less than 15 ml/min/1 73 sq  meters      Troponin I [[id number]]  (Normal) Collected:  07/30/18 1945    Lab Status:  Final result Specimen:  Blood from Arm, Left Updated:  07/30/18 2025     Troponin I <0 03 ng/mL     CBC and differential [[id number]]  (Abnormal) Collected:  07/30/18 1945    Lab Status:  Final result Specimen:  Blood from Arm, Left Updated:  07/30/18 2005     WBC 5 80 Thousand/uL      RBC 4 15 Million/uL      Hemoglobin 12 5 g/dL      Hematocrit 35 9 %      MCV 87 fL      MCH 30 1 pg      MCHC 34 8 g/dL      RDW 13 0 %      MPV 6 8 (L) fL      Platelets 410 Thousands/uL      nRBC 0 /100 WBCs      Neutrophils Relative 65 %      Lymphocytes Relative 27 %      Monocytes Relative 5 %      Eosinophils Relative 2 %      Basophils Relative 1 %      Neutrophils Absolute 3 80 Thousands/µL      Lymphocytes Absolute 1 60 Thousands/µL      Monocytes Absolute 0 30 Thousand/µL      Eosinophils Absolute 0 10 Thousand/µL      Basophils Absolute 0 00 Thousands/µL                  CT soft tissue neck with contrast   Final Result by Fallon Juares (07/30 2130)   1  No acute findings  No discrete mass lesion or adenopathy  Signed by Yehuda Zheng MD                 Procedures  Procedures       Phone Contacts  ED Phone Contact    ED Course      patient had significant relief with IV Toradol                          [facility]  CritCare Time    Disposition  Final diagnoses:   Neck pain on right side     Time reflects when diagnosis was documented in both MDM as applicable and the Disposition within this note     Time User Action Codes Description Comment    7/30/2018 11:25 PM Tom Rosen Add [M54 2] Neck pain on right side       ED Disposition     ED Disposition Condition Comment    Discharge  Farhana Lema discharge to home/self care      Condition at discharge: Good        Follow-up Information     Follow up With Specialties Details Why [address], [address] [facility], Nurse Practitioner In 1 week  [facility]  [address]   [phone]            Discharge Medication List as of 7/30/2018 11:30 PM      START taking these medications    Details   naproxen (NAPROSYN) 500 mg tablet Take 1 tablet (500 mg total) by mouth 2 (two) times a day with meals, Starting Mon 7/30/2018, Normal         CONTINUE these medications which have NOT CHANGED    Details   cyclobenzaprine (FLEXERIL) 10 mg tablet Take 1 tablet (10 mg total) by mouth 3 (three) times a day as needed for muscle spasms, Starting Mon 7/30/2018, Normal      ondansetron (ZOFRAN) 4 mg tablet Take 1 tablet (4 mg total) by mouth every 8 (eight) hours as needed for nausea or vomiting for up to 10 doses, Starting Tue 7/10/2018, Print           No discharge procedures on file      ED Provider  Electronically Signed by           Julien Flanagan, DO  07/31/18 5721

## 2018-07-31 LAB
ATRIAL RATE: 75 BPM
P AXIS: 20 DEGREES
PR INTERVAL: 142 MS
QRS AXIS: 25 DEGREES
QRSD INTERVAL: 74 MS
QT INTERVAL: 378 MS
QTC INTERVAL: 422 MS
T WAVE AXIS: 44 DEGREES
VENTRICULAR RATE: 75 BPM

## 2018-07-31 NOTE — DISCHARGE INSTRUCTIONS
Acute Neck Pain   WHAT YOU NEED TO KNOW:   What do I need to know about acute neck pain? Acute neck pain starts suddenly, increases quickly, and goes away in a few days  The pain may come and go, or be worse with certain movements  The pain may be only in your neck, or it may move to your arms, back, or shoulders  You may also have pain that starts in another body area and moves to your neck  What causes or increases my risk for acute neck pain? Acute neck pain is often caused by a muscle strain or ligament sprain  Any of the following may cause acute neck pain:  · Inflammation of discs in your neck    · A condition that affects neck to arm nerves, such as thoracic outlet syndrome or brachial neuritis     · A trauma or injury to your neck, such as being hit from behind in a car (whiplash) or sleeping in a bad position    · Shingles, or an infection such as meningitis  How is the cause of acute neck pain diagnosed? Your healthcare provider will ask about your symptoms and when they began  Tell him if you were recently in an accident or had another injury to your neck  He will examine your neck and shoulders  He may also have you move your head in certain ways to see if any position causes or relieves the pain  · Blood tests  may be used to measure the amount of inflammation or to check for signs of an infection  · X-ray or MRI  pictures may show a neck injury or medical condition  Do not enter the MRI room with anything metal  Metal can cause serious damage  Tell the healthcare provider if you have any metal in or on your body  How is acute neck pain treated? Treatment will depend on what is causing your pain  · Medicines  may be prescribed or recommended by your healthcare provider for pain  You may need medicine to treat nerve pain or to stop muscle spasms  Medicines may also be given to reduce inflammation  Your healthcare provider may inject medicine into a nerve to block pain   Over-the-counter NSAID medicine or acetaminophen may be recommended to help treat minor pain or inflammation  · Traction  is used to relieve pressure from nerves  Your head is gently pulled up and away from your neck  This stretches muscles and ligaments and makes more room for the spine  Your healthcare provider will tell you the kind of traction that will help your neck pain  Do not use traction devices at home unless directed by your healthcare provider  What can I do to manage or prevent acute neck pain? · Rest your neck as directed  Do not make sudden movements, such as turning your head quickly  Your healthcare provider may recommend you wear a cervical collar for a short time  The collar will prevent you from moving your head  This will give your neck time to heal if an injury is causing your neck pain  Ask your healthcare provider when you can return to sports or other normal daily activities  · Apply heat as directed  Heat helps relieve pain and swelling  Use a heat wrap, or soak a small towel in warm water  Wring out the extra water  Apply the heat wrap or towel for 20 minutes every hour, or as directed  · Apply ice as directed  Ice helps relieve pain and swelling, and can help prevent tissue damage  Use an ice pack, or put ice in a bag  Cover the ice pack or back with a towel before you apply it to your neck  Apply the ice pack or ice for 15 minutes every hour, or as directed  Your healthcare provider can tell you how often to apply ice  · Do neck exercises as directed  Neck exercises help strengthen the muscles and increase range of motion  Your healthcare provider will tell you which exercises are right for you  He may give you instructions, or he may recommend that you work with a physical therapist  Your healthcare provider or therapist can make sure you are doing the exercises correctly  · Maintain good posture  Try to keep your head and shoulders lifted when you sit   If you work in front of a computer, make sure the monitor is at the right level  You should not need to look up down to see the screen  You should also not have to lean forward to be able to read what is on the screen  Make sure your keyboard, mouse, and other computer items are placed where you do not have to extend your shoulder to reach them  Get up often if you work in front of a computer or sit for long periods of time  Stretch or walk around to keep your neck muscles loose  When should I seek immediate care? · You have an injury that causes neck pain and shooting pain down your arms or legs  · Your neck pain suddenly becomes severe  · You have neck pain along with numbness, tingling, or weakness in your arms or legs  · You have a stiff neck, a headache, and a fever  When should I contact my healthcare provider? · You have new or worsening symptoms  · Your symptoms continue even after treatment  · You have questions or concerns about your condition or care  CARE AGREEMENT:   You have the right to help plan your care  Learn about your health condition and how it may be treated  Discuss treatment options with your caregivers to decide what care you want to receive  You always have the right to refuse treatment  The above information is an  only  It is not intended as medical advice for individual conditions or treatments  Talk to your doctor, nurse or pharmacist before following any medical regimen to see if it is safe and effective for you  © 2017 [address] [employer] Information is for End User's use only and may not be sold, redistributed or otherwise used for commercial purposes  All illustrations and images included in CareNotes® are the copyrighted property of A [employer] M , [employer]  or [employer]

## 2018-11-15 ENCOUNTER — OFFICE VISIT (OUTPATIENT)
Dept: FAMILY MEDICINE CLINIC | Facility: CLINIC | Age: 28
End: 2018-11-15

## 2018-11-15 VITALS
SYSTOLIC BLOOD PRESSURE: 114 MMHG | TEMPERATURE: 97.4 F | RESPIRATION RATE: 17 BRPM | WEIGHT: 241 LBS | BODY MASS INDEX: 38.73 KG/M2 | HEIGHT: 66 IN | DIASTOLIC BLOOD PRESSURE: 82 MMHG | OXYGEN SATURATION: 99 % | HEART RATE: 92 BPM

## 2018-11-15 DIAGNOSIS — M54.2 NECK PAIN ON RIGHT SIDE: ICD-10-CM

## 2018-11-15 DIAGNOSIS — M62.838 NECK MUSCLE SPASM: Primary | ICD-10-CM

## 2018-11-15 PROCEDURE — T1015 CLINIC SERVICE: HCPCS | Performed by: FAMILY MEDICINE

## 2018-11-15 RX ORDER — METHOCARBAMOL 750 MG/1
750 TABLET, FILM COATED ORAL EVERY 6 HOURS SCHEDULED
Qty: 90 TABLET | Refills: 1 | Status: SHIPPED | OUTPATIENT
Start: 2018-11-15 | End: 2019-01-23 | Stop reason: SDUPTHER

## 2018-11-15 RX ORDER — NAPROXEN 500 MG/1
500 TABLET ORAL 2 TIMES DAILY WITH MEALS
Qty: 90 TABLET | Refills: 0 | Status: SHIPPED | OUTPATIENT
Start: 2018-11-15 | End: 2019-07-19 | Stop reason: ALTCHOICE

## 2018-11-15 RX ORDER — METHOCARBAMOL 750 MG/1
TABLET, FILM COATED ORAL
Refills: 0 | COMMUNITY
Start: 2018-10-30 | End: 2018-11-15 | Stop reason: SDUPTHER

## 2018-11-15 NOTE — PROGRESS NOTES
OFFICE VISIT  Gaby Echeverria 29 y o  female MRN: [id number]      Assessment / Plan:  Diagnoses and all orders for this visit:    Neck muscle spasm  -     methocarbamol (ROBAXIN) 750 mg tablet; Take 1 tablet (750 mg total) by mouth every 6 (six) hours    Neck pain on right side  -     methocarbamol (ROBAXIN) 750 mg tablet; Take 1 tablet (750 mg total) by mouth every 6 (six) hours  -     naproxen (NAPROSYN) 500 mg tablet; Take 1 tablet (500 mg total) by mouth 2 (two) times a day with meals    Other orders  -     Discontinue: methocarbamol (ROBAXIN) 750 mg tablet; TAKE ONE TABLET BY MOUTH 4 TIMES A DAY AS NEEDED FOR MUSCLE SPASMS          Reason For Visit / Chief Complaint  Chief Complaint   Patient presents with    Follow-up     Would like a refill on her muscle relaxers  She states she has been in and out of the ER  Her neck is bothering her  HPI:  Gaby Echeverria is a 29 y o  female who presents today for neck pain  She reports having tightness, pressure into her neck  She was seen ED for same  She reports getting muscle spasms into her arms  She reports having neck problems at age 13, stiff neck  She has used muscle relaxer with good relief she has no insurance       Historical Information   Past Medical History:   Diagnosis Date    Anemia     Post partum depression      Past Surgical History:   Procedure Laterality Date    APPENDECTOMY       SECTION       Social History   History   Alcohol Use No     History   Drug Use    Types: Marijuana     Comment: For sleeping and nausea     History   Smoking Status    Former Smoker    Quit date: 2017   Smokeless Tobacco    Never Used     Family History   Problem Relation Age of Onset    Haslet's disease Family        Meds/Allergies   No Known Allergies    Meds:    Current Outpatient Prescriptions:     methocarbamol (ROBAXIN) 750 mg tablet, Take 1 tablet (750 mg total) by mouth every 6 (six) hours, Disp: 90 tablet, Rfl: 1    naproxen (NAPROSYN) 500 mg tablet, Take 1 tablet (500 mg total) by mouth 2 (two) times a day with meals, Disp: 90 tablet, Rfl: 0      REVIEW OF SYSTEMS  Review of Systems   Constitutional: Negative for activity change, chills, fatigue and fever  HENT: Negative for congestion, ear discharge, ear pain, sinus pain, sinus pressure, sore throat, tinnitus and trouble swallowing  Eyes: Negative for photophobia, pain, discharge, itching and visual disturbance  Respiratory: Negative for cough, chest tightness, shortness of breath and wheezing  Cardiovascular: Negative for chest pain and leg swelling  Gastrointestinal: Negative for abdominal distention, abdominal pain, constipation, diarrhea, nausea and vomiting  Endocrine: Negative for polydipsia, polyphagia and polyuria  Genitourinary: Negative for dysuria and frequency  Musculoskeletal: Positive for neck pain and neck stiffness  Negative for arthralgias and myalgias  Skin: Negative for color change  Neurological: Negative for dizziness, syncope, weakness, numbness and headaches  Hematological: Does not bruise/bleed easily  Psychiatric/Behavioral: Negative for behavioral problems, confusion, self-injury, sleep disturbance and suicidal ideas  The patient is not nervous/anxious  Current Vitals:   Blood Pressure: 114/82 (11/15/18 1041)  Pulse: 92 (11/15/18 1041)  Temperature: (!) 97 4 °F (36 3 °C) (11/15/18 1041)  Respirations: 17 (11/15/18 1041)  Height: 5' 6" (167 6 cm) (11/15/18 1041)  Weight - Scale: 109 kg (241 lb) (11/15/18 1041)  SpO2: 99 % (11/15/18 1041)  [unfilled]    PHYSICAL EXAMS:  Physical Exam   Constitutional: She is oriented to person, place, and time  She appears well-developed and well-nourished  HENT:   Head: Normocephalic  Right Ear: External ear normal    Left Ear: External ear normal    Mouth/Throat: Oropharynx is clear and moist    Eyes: Pupils are equal, round, and reactive to light   Conjunctivae are normal    Neck: Neck supple  Cardiovascular: Normal rate and regular rhythm  Pulmonary/Chest: Effort normal and breath sounds normal    Abdominal: Soft  Bowel sounds are normal  She exhibits no distension  There is no tenderness  Musculoskeletal: Normal range of motion  Right shoulder: She exhibits tenderness  Neurological: She is alert and oriented to person, place, and time  Skin: Skin is warm and dry  Psychiatric: She has a normal mood and affect  Follow up at this office in 2 weeks    Counseling / Coordination of Care  Total floor / unit time spent today 20 minutes  Greater than 50% of total time was spent with the patient and / or family counseling and / or coordination of care

## 2019-01-23 DIAGNOSIS — M62.838 NECK MUSCLE SPASM: ICD-10-CM

## 2019-01-23 DIAGNOSIS — M54.2 NECK PAIN ON RIGHT SIDE: ICD-10-CM

## 2019-01-23 RX ORDER — METHOCARBAMOL 750 MG/1
750 TABLET, FILM COATED ORAL EVERY 6 HOURS SCHEDULED
Qty: 90 TABLET | Refills: 1 | Status: SHIPPED | OUTPATIENT
Start: 2019-01-23 | End: 2019-05-22 | Stop reason: ALTCHOICE

## 2019-04-18 ENCOUNTER — TRANSCRIBE ORDERS (OUTPATIENT)
Dept: ADMINISTRATIVE | Facility: HOSPITAL | Age: 29
End: 2019-04-18

## 2019-04-18 DIAGNOSIS — Z3A.12 12 WEEKS GESTATION OF PREGNANCY: ICD-10-CM

## 2019-04-18 DIAGNOSIS — O26.841 SIZE OF FETUS INCONSISTENT WITH DATES IN FIRST TRIMESTER: Primary | ICD-10-CM

## 2019-04-18 DIAGNOSIS — Z36.87 ENCOUNTER FOR ANTENATAL SCREENING FOR UNCERTAIN DATES: ICD-10-CM

## 2019-04-22 ENCOUNTER — HOSPITAL ENCOUNTER (OUTPATIENT)
Dept: ULTRASOUND IMAGING | Facility: HOSPITAL | Age: 29
Discharge: HOME | End: 2019-04-22
Attending: SPECIALIST
Payer: COMMERCIAL

## 2019-04-22 DIAGNOSIS — Z36.87 ENCOUNTER FOR ANTENATAL SCREENING FOR UNCERTAIN DATES: ICD-10-CM

## 2019-04-22 DIAGNOSIS — O26.841 SIZE OF FETUS INCONSISTENT WITH DATES IN FIRST TRIMESTER: ICD-10-CM

## 2019-04-22 PROCEDURE — 76801 OB US < 14 WKS SINGLE FETUS: CPT

## 2019-05-01 ENCOUNTER — APPOINTMENT (OUTPATIENT)
Dept: LAB | Facility: HOSPITAL | Age: 29
End: 2019-05-01
Attending: SPECIALIST
Payer: COMMERCIAL

## 2019-05-01 DIAGNOSIS — Z3A.12 12 WEEKS GESTATION OF PREGNANCY: ICD-10-CM

## 2019-05-01 LAB
ABO GROUP BLD: NORMAL
BACTERIA UR QL AUTO: ABNORMAL /HPF
BILIRUB UR QL STRIP: NEGATIVE
BLD GP AB SCN SERPL QL: NEGATIVE
CLARITY UR: CLEAR
COLOR UR: YELLOW
ERYTHROCYTE [DISTWIDTH] IN BLOOD BY AUTOMATED COUNT: 13.4 % (ref 11.5–14.5)
GLUCOSE UR STRIP-MCNC: NEGATIVE MG/DL
HCT VFR BLD AUTO: 35.1 % (ref 42–47)
HGB BLD-MCNC: 12.1 G/DL (ref 12–16)
HGB UR QL STRIP.AUTO: ABNORMAL
KETONES UR STRIP-MCNC: ABNORMAL MG/DL
LEUKOCYTE ESTERASE UR QL STRIP: NEGATIVE
MCH RBC QN AUTO: 30.7 PG (ref 26–34)
MCHC RBC AUTO-ENTMCNC: 34.4 G/DL (ref 31–37)
MCV RBC AUTO: 89 FL (ref 81–99)
MUCOUS THREADS UR QL AUTO: ABNORMAL
NITRITE UR QL STRIP: NEGATIVE
NON-SQ EPI CELLS URNS QL MICRO: ABNORMAL /HPF
PH UR STRIP.AUTO: 6 [PH]
PLATELET # BLD AUTO: 253 THOUSANDS/UL (ref 149–390)
PMV BLD AUTO: 7 FL (ref 8.6–11.7)
PROT UR STRIP-MCNC: NEGATIVE MG/DL
RBC # BLD AUTO: 3.93 MILLION/UL (ref 3.9–5.2)
RBC #/AREA URNS AUTO: ABNORMAL /HPF
RH BLD: NEGATIVE
RUBV IGG SERPL IA-ACNC: 20 IU/ML
SP GR UR STRIP.AUTO: 1.02 (ref 1–1.03)
SPECIMEN EXPIRATION DATE: NORMAL
UROBILINOGEN UR QL STRIP.AUTO: 0.2 E.U./DL
WBC # BLD AUTO: 6.4 THOUSAND/UL (ref 4.8–10.8)
WBC #/AREA URNS AUTO: ABNORMAL /HPF

## 2019-05-01 PROCEDURE — 86803 HEPATITIS C AB TEST: CPT

## 2019-05-01 PROCEDURE — 86762 RUBELLA ANTIBODY: CPT

## 2019-05-01 PROCEDURE — 86592 SYPHILIS TEST NON-TREP QUAL: CPT

## 2019-05-01 PROCEDURE — 87086 URINE CULTURE/COLONY COUNT: CPT

## 2019-05-01 PROCEDURE — 86900 BLOOD TYPING SEROLOGIC ABO: CPT

## 2019-05-01 PROCEDURE — 86850 RBC ANTIBODY SCREEN: CPT

## 2019-05-01 PROCEDURE — 86901 BLOOD TYPING SEROLOGIC RH(D): CPT

## 2019-05-01 PROCEDURE — 85027 COMPLETE CBC AUTOMATED: CPT

## 2019-05-01 PROCEDURE — 87340 HEPATITIS B SURFACE AG IA: CPT

## 2019-05-01 PROCEDURE — 36415 COLL VENOUS BLD VENIPUNCTURE: CPT

## 2019-05-01 PROCEDURE — 87186 SC STD MICRODIL/AGAR DIL: CPT

## 2019-05-01 PROCEDURE — 87389 HIV-1 AG W/HIV-1&-2 AB AG IA: CPT

## 2019-05-01 PROCEDURE — 87147 CULTURE TYPE IMMUNOLOGIC: CPT

## 2019-05-01 PROCEDURE — 81001 URINALYSIS AUTO W/SCOPE: CPT | Performed by: SPECIALIST

## 2019-05-02 LAB
HBV SURFACE AG SER QL: NORMAL
HCV AB SER QL: NORMAL
HIV 1+2 AB+HIV1 P24 AG SERPL QL IA: NORMAL
RPR SER QL: NORMAL

## 2019-05-03 LAB
BACTERIA UR CULT: ABNORMAL
BACTERIA UR CULT: ABNORMAL

## 2019-05-22 ENCOUNTER — OFFICE VISIT (OUTPATIENT)
Dept: FAMILY MEDICINE CLINIC | Facility: CLINIC | Age: 29
End: 2019-05-22
Payer: COMMERCIAL

## 2019-05-22 VITALS
OXYGEN SATURATION: 98 % | HEART RATE: 94 BPM | DIASTOLIC BLOOD PRESSURE: 80 MMHG | HEIGHT: 66 IN | RESPIRATION RATE: 16 BRPM | SYSTOLIC BLOOD PRESSURE: 122 MMHG | WEIGHT: 253.8 LBS | TEMPERATURE: 97.7 F | BODY MASS INDEX: 40.79 KG/M2

## 2019-05-22 DIAGNOSIS — G89.29 CHRONIC NECK PAIN: Primary | ICD-10-CM

## 2019-05-22 DIAGNOSIS — D50.9 IRON DEFICIENCY ANEMIA, UNSPECIFIED IRON DEFICIENCY ANEMIA TYPE: ICD-10-CM

## 2019-05-22 DIAGNOSIS — K21.9 GASTROESOPHAGEAL REFLUX DISEASE WITHOUT ESOPHAGITIS: ICD-10-CM

## 2019-05-22 DIAGNOSIS — M62.838 NECK MUSCLE SPASM: ICD-10-CM

## 2019-05-22 DIAGNOSIS — M54.2 CHRONIC NECK PAIN: Primary | ICD-10-CM

## 2019-05-22 DIAGNOSIS — R55 SYNCOPE, UNSPECIFIED SYNCOPE TYPE: ICD-10-CM

## 2019-05-22 PROCEDURE — 99213 OFFICE O/P EST LOW 20 MIN: CPT | Performed by: FAMILY MEDICINE

## 2019-05-22 RX ORDER — FAMOTIDINE 20 MG/1
20 TABLET, FILM COATED ORAL 2 TIMES DAILY
Qty: 60 TABLET | Refills: 2 | Status: SHIPPED | OUTPATIENT
Start: 2019-05-22 | End: 2019-07-19 | Stop reason: ALTCHOICE

## 2019-05-29 ENCOUNTER — TRANSCRIBE ORDERS (OUTPATIENT)
Dept: PERINATAL CARE | Facility: CLINIC | Age: 29
End: 2019-05-29

## 2019-05-29 DIAGNOSIS — O09.90 SUPERVISION OF HIGH-RISK PREGNANCY: Primary | ICD-10-CM

## 2019-06-05 ENCOUNTER — APPOINTMENT (OUTPATIENT)
Dept: LAB | Facility: HOSPITAL | Age: 29
End: 2019-06-05
Payer: COMMERCIAL

## 2019-06-05 ENCOUNTER — TRANSCRIBE ORDERS (OUTPATIENT)
Dept: ADMINISTRATIVE | Facility: HOSPITAL | Age: 29
End: 2019-06-05

## 2019-06-05 DIAGNOSIS — R35.0 FREQUENCY OF URINATION: ICD-10-CM

## 2019-06-05 DIAGNOSIS — R55 SYNCOPE, UNSPECIFIED SYNCOPE TYPE: ICD-10-CM

## 2019-06-05 DIAGNOSIS — R35.0 FREQUENCY OF URINATION: Primary | ICD-10-CM

## 2019-06-05 DIAGNOSIS — D50.9 IRON DEFICIENCY ANEMIA, UNSPECIFIED IRON DEFICIENCY ANEMIA TYPE: ICD-10-CM

## 2019-06-05 LAB
BILIRUB UR QL STRIP: NEGATIVE
CLARITY UR: CLEAR
COLOR UR: YELLOW
FERRITIN SERPL-MCNC: 60 NG/ML (ref 8–388)
GLUCOSE UR STRIP-MCNC: NEGATIVE MG/DL
HGB UR QL STRIP.AUTO: NEGATIVE
IRON SATN MFR SERPL: 23 %
IRON SERPL-MCNC: 83 UG/DL (ref 50–170)
KETONES UR STRIP-MCNC: NEGATIVE MG/DL
LEUKOCYTE ESTERASE UR QL STRIP: NEGATIVE
NITRITE UR QL STRIP: NEGATIVE
PH UR STRIP.AUTO: 7 [PH]
PROT UR STRIP-MCNC: NEGATIVE MG/DL
SP GR UR STRIP.AUTO: 1.01 (ref 1–1.03)
TIBC SERPL-MCNC: 354 UG/DL (ref 250–450)
UROBILINOGEN UR QL STRIP.AUTO: 0.2 E.U./DL

## 2019-06-05 PROCEDURE — 83540 ASSAY OF IRON: CPT

## 2019-06-05 PROCEDURE — 87086 URINE CULTURE/COLONY COUNT: CPT

## 2019-06-05 PROCEDURE — 82728 ASSAY OF FERRITIN: CPT

## 2019-06-05 PROCEDURE — 36415 COLL VENOUS BLD VENIPUNCTURE: CPT

## 2019-06-05 PROCEDURE — 81003 URINALYSIS AUTO W/O SCOPE: CPT | Performed by: SPECIALIST

## 2019-06-05 PROCEDURE — 83550 IRON BINDING TEST: CPT

## 2019-06-05 PROCEDURE — 87147 CULTURE TYPE IMMUNOLOGIC: CPT

## 2019-06-07 LAB
BACTERIA UR CULT: ABNORMAL
BACTERIA UR CULT: ABNORMAL

## 2019-07-10 ENCOUNTER — ROUTINE PRENATAL (OUTPATIENT)
Dept: PERINATAL CARE | Facility: OTHER | Age: 29
End: 2019-07-10

## 2019-07-10 VITALS
SYSTOLIC BLOOD PRESSURE: 112 MMHG | HEIGHT: 66 IN | BODY MASS INDEX: 40.82 KG/M2 | DIASTOLIC BLOOD PRESSURE: 66 MMHG | WEIGHT: 254 LBS | HEART RATE: 94 BPM

## 2019-07-10 DIAGNOSIS — O09.90 SUPERVISION OF HIGH-RISK PREGNANCY: ICD-10-CM

## 2019-07-19 ENCOUNTER — ROUTINE PRENATAL (OUTPATIENT)
Dept: PERINATAL CARE | Facility: OTHER | Age: 29
End: 2019-07-19
Payer: COMMERCIAL

## 2019-07-19 VITALS
DIASTOLIC BLOOD PRESSURE: 76 MMHG | WEIGHT: 254.6 LBS | HEIGHT: 66 IN | HEART RATE: 96 BPM | SYSTOLIC BLOOD PRESSURE: 127 MMHG | BODY MASS INDEX: 40.92 KG/M2

## 2019-07-19 DIAGNOSIS — O34.219 HISTORY OF CESAREAN DELIVERY, ANTEPARTUM: ICD-10-CM

## 2019-07-19 DIAGNOSIS — Z36.3 ENCOUNTER FOR ANTENATAL SCREENING FOR MALFORMATIONS: ICD-10-CM

## 2019-07-19 DIAGNOSIS — O99.210 MATERNAL MORBID OBESITY, ANTEPARTUM (HCC): Primary | ICD-10-CM

## 2019-07-19 DIAGNOSIS — Z82.0 FAMILY HISTORY OF HUNTINGTON'S DISEASE: ICD-10-CM

## 2019-07-19 DIAGNOSIS — E66.01 MATERNAL MORBID OBESITY, ANTEPARTUM (HCC): Primary | ICD-10-CM

## 2019-07-19 DIAGNOSIS — Z3A.24 24 WEEKS GESTATION OF PREGNANCY: ICD-10-CM

## 2019-07-19 PROCEDURE — 76811 OB US DETAILED SNGL FETUS: CPT | Performed by: OBSTETRICS & GYNECOLOGY

## 2019-07-19 PROCEDURE — 99242 OFF/OP CONSLTJ NEW/EST SF 20: CPT | Performed by: OBSTETRICS & GYNECOLOGY

## 2019-07-19 NOTE — PATIENT INSTRUCTIONS
Thank you for choosing [address] [place] [address] for your  care today  If you have any questions about your ultrasound or care, please do not hesitate to contact us or your primary obstetrician

## 2019-07-19 NOTE — PROGRESS NOTES
[address]: Ms Christine Elam was seen today at 24w0d for anatomic survey ultrasound  See ultrasound report under "OB Procedures" tab  Please don't hesitate to contact our office with any concerns or questions    Agustin Little MD

## 2019-08-15 ENCOUNTER — APPOINTMENT (OUTPATIENT)
Dept: LAB | Facility: HOSPITAL | Age: 29
End: 2019-08-15
Attending: SPECIALIST
Payer: COMMERCIAL

## 2019-08-15 ENCOUNTER — TRANSCRIBE ORDERS (OUTPATIENT)
Dept: LAB | Facility: HOSPITAL | Age: 29
End: 2019-08-15

## 2019-08-15 DIAGNOSIS — Z3A.27 27 WEEKS GESTATION OF PREGNANCY: Primary | ICD-10-CM

## 2019-08-15 DIAGNOSIS — Z3A.27 27 WEEKS GESTATION OF PREGNANCY: ICD-10-CM

## 2019-08-15 LAB
ERYTHROCYTE [DISTWIDTH] IN BLOOD BY AUTOMATED COUNT: 13 % (ref 11.5–14.5)
GLUCOSE 1H P 50 G GLC PO SERPL-MCNC: 165 MG/DL (ref 40–134)
HCT VFR BLD AUTO: 30.8 % (ref 42–47)
HGB BLD-MCNC: 10.4 G/DL (ref 12–16)
MCH RBC QN AUTO: 31.1 PG (ref 26–34)
MCHC RBC AUTO-ENTMCNC: 33.7 G/DL (ref 31–37)
MCV RBC AUTO: 92 FL (ref 81–99)
PLATELET # BLD AUTO: 263 THOUSANDS/UL (ref 149–390)
PMV BLD AUTO: 6.9 FL (ref 8.6–11.7)
RBC # BLD AUTO: 3.34 MILLION/UL (ref 3.9–5.2)
WBC # BLD AUTO: 8.3 THOUSAND/UL (ref 4.8–10.8)

## 2019-08-15 PROCEDURE — 82950 GLUCOSE TEST: CPT

## 2019-08-15 PROCEDURE — 85027 COMPLETE CBC AUTOMATED: CPT

## 2019-08-15 PROCEDURE — 36415 COLL VENOUS BLD VENIPUNCTURE: CPT

## 2019-08-23 ENCOUNTER — APPOINTMENT (OUTPATIENT)
Dept: LAB | Facility: HOSPITAL | Age: 29
End: 2019-08-23
Attending: SPECIALIST
Payer: COMMERCIAL

## 2019-08-23 ENCOUNTER — TRANSCRIBE ORDERS (OUTPATIENT)
Dept: LAB | Facility: HOSPITAL | Age: 29
End: 2019-08-23

## 2019-08-23 DIAGNOSIS — Z3A.29 29 WEEKS GESTATION OF PREGNANCY: ICD-10-CM

## 2019-08-23 DIAGNOSIS — Z3A.29 29 WEEKS GESTATION OF PREGNANCY: Primary | ICD-10-CM

## 2019-08-23 LAB
ABO GROUP BLD: NORMAL
BLD GP AB SCN SERPL QL: NEGATIVE
GLUCOSE 1H P 100 G GLC PO SERPL-MCNC: 170 MG/DL (ref 65–179)
GLUCOSE 2H P 100 G GLC PO SERPL-MCNC: 129 MG/DL (ref 65–99)
GLUCOSE 3H P 100 G GLC PO SERPL-MCNC: 66 MG/DL (ref 65–139)
GLUCOSE P FAST SERPL-MCNC: 92 MG/DL (ref 65–99)
RH BLD: NEGATIVE
SPECIMEN EXPIRATION DATE: NORMAL

## 2019-08-23 PROCEDURE — 82952 GTT-ADDED SAMPLES: CPT

## 2019-08-23 PROCEDURE — 86850 RBC ANTIBODY SCREEN: CPT

## 2019-08-23 PROCEDURE — 82951 GLUCOSE TOLERANCE TEST (GTT): CPT

## 2019-08-23 PROCEDURE — 86900 BLOOD TYPING SEROLOGIC ABO: CPT

## 2019-08-23 PROCEDURE — 36415 COLL VENOUS BLD VENIPUNCTURE: CPT

## 2019-08-23 PROCEDURE — 86901 BLOOD TYPING SEROLOGIC RH(D): CPT

## 2019-08-26 ENCOUNTER — ULTRASOUND (OUTPATIENT)
Dept: PERINATAL CARE | Facility: CLINIC | Age: 29
End: 2019-08-26
Payer: COMMERCIAL

## 2019-08-26 VITALS
HEIGHT: 66 IN | BODY MASS INDEX: 41.62 KG/M2 | HEART RATE: 99 BPM | WEIGHT: 259 LBS | SYSTOLIC BLOOD PRESSURE: 109 MMHG | DIASTOLIC BLOOD PRESSURE: 69 MMHG

## 2019-08-26 DIAGNOSIS — E66.01 CLASS 3 SEVERE OBESITY DUE TO EXCESS CALORIES WITHOUT SERIOUS COMORBIDITY WITH BODY MASS INDEX (BMI) OF 40.0 TO 44.9 IN ADULT (HCC): ICD-10-CM

## 2019-08-26 DIAGNOSIS — Z3A.29 29 WEEKS GESTATION OF PREGNANCY: Primary | ICD-10-CM

## 2019-08-26 PROCEDURE — 76816 OB US FOLLOW-UP PER FETUS: CPT | Performed by: OBSTETRICS & GYNECOLOGY

## 2019-08-26 NOTE — PROGRESS NOTES
Follow-up ultrasound at 29 3/7 weeks for completion of fetal anatomy and fetal growth  Asymptomatic  Elevated BMI at 41 8  See Ob procedures in EPIC  1  Live fetus  2  Fetal size = dates  3  No fetal anomalies observed  Limited ultrasound  Recommendations:      1  Follow-up ultrasound in 5 weeks to monitor growth and development  Thank you for referring your patient to our offices  The limitations of ultrasound to detect all anomalies was reviewed and how it is not  a test to rule out aneuploidy  If you have any further questions do not hesitate to contact us as [phone]      Vilma Ramos MD

## 2019-08-29 ENCOUNTER — TELEPHONE (OUTPATIENT)
Dept: PERINATAL CARE | Facility: CLINIC | Age: 29
End: 2019-08-29

## 2019-08-29 NOTE — TELEPHONE ENCOUNTER
Spoke to patient and informed that we are none par with her insurance that if she keepd coming to us that she'll get billed for her visits  Patient said that she's going to get an in network M

## 2019-09-24 LAB — EXTERNAL GROUP B STREP ANTIGEN: POSITIVE

## 2019-10-01 ENCOUNTER — ULTRASOUND (OUTPATIENT)
Dept: PERINATAL CARE | Facility: OTHER | Age: 29
End: 2019-10-01

## 2019-10-01 VITALS
HEART RATE: 97 BPM | HEIGHT: 66 IN | DIASTOLIC BLOOD PRESSURE: 71 MMHG | SYSTOLIC BLOOD PRESSURE: 102 MMHG | BODY MASS INDEX: 41.46 KG/M2 | WEIGHT: 258 LBS

## 2019-10-01 DIAGNOSIS — E66.01 MATERNAL MORBID OBESITY IN THIRD TRIMESTER, ANTEPARTUM (HCC): Primary | ICD-10-CM

## 2019-10-01 DIAGNOSIS — O99.213 MATERNAL MORBID OBESITY IN THIRD TRIMESTER, ANTEPARTUM (HCC): Primary | ICD-10-CM

## 2019-10-01 DIAGNOSIS — Z3A.34 34 WEEKS GESTATION OF PREGNANCY: ICD-10-CM

## 2019-10-01 PROCEDURE — 99212 OFFICE O/P EST SF 10 MIN: CPT | Performed by: OBSTETRICS & GYNECOLOGY

## 2019-10-01 PROCEDURE — 76816 OB US FOLLOW-UP PER FETUS: CPT | Performed by: OBSTETRICS & GYNECOLOGY

## 2019-10-01 RX ORDER — FERROUS SULFATE 325(65) MG
28 TABLET ORAL
COMMUNITY
End: 2019-11-26

## 2019-10-01 NOTE — PROGRESS NOTES
Please refer to the [facility] ultrasound report in Ob Procedures for additional information regarding the visit to the [facility], [facility]  today

## 2019-10-01 NOTE — PATIENT INSTRUCTIONS
Kick Counts in Pregnancy   WHAT YOU NEED TO KNOW:   Kick counts measure how much your baby is moving in your womb  A kick from your baby can be felt as a twist, turn, swish, roll, or jab  It is common to feel your baby kicking at 26 to 28 weeks of pregnancy  You may feel your baby kick as early as 20 weeks of pregnancy  DISCHARGE INSTRUCTIONS:   Return to the emergency department if:   · You feel your baby kick less as the day goes on      · You do not feel any kicks in a day  Contact your healthcare provider if:   · You feel a change in the number of kicks or movements of your baby  · You feel fewer than 10 kicks within 2 hours after counting twice  · You have questions or concerns about your baby's movements  Why measure kick counts:  Your baby's movement may provide information about your baby's health  He may move less, or not at all, if there are problems  He may move less if he does not have enough room to grow in your uterus (womb)  He may also move less if he is not getting enough oxygen or nutrition from the placenta  Tell your healthcare provider as soon as you feel a change in your baby's movements  Problems that are found earlier are easier to treat  When to measure kick counts:   · Measure kick counts at the same time every day  · Measure kick counts when your baby is awake and most active  Your baby may be most active in the evening  · Measure kick counts after a meal or snack  Your baby may be more active after you eat  Wait 2 hours after you drink liquids that contain caffeine  Caffeine can make your baby more active than usual     · You should not smoke while you are pregnant  Smoking increases the risk of health problems for you and for your baby during your pregnancy  If you do smoke, wait 2 hours to measure kick counts  Nicotine can make your baby more active than usual   How to measure kick counts:  Check that your baby is awake before you measure kick counts   You can wake up your baby by lightly pushing on your belly, walking, or drinking something cold  Your healthcare provider may tell you different ways to measure kick counts  He may tell you to do the following:  · Use a chart or clock to keep track of the time you start and finish counting  · Sit in a chair or lie on your left side  · Place your hands on the largest part of your belly  · Count until you reach 10 kicks  Write down how much time it takes to count 10 kicks  · It may take 30 minutes to 2 hours to count 10 kicks  It should not take more than 2 hours to count 10 kicks  · If you do not feel 10 kicks within 2 hours, wait 1 hour and count again  Your baby can sleep for up to 40 minutes at one time  Follow up with your healthcare provider as directed:  Write down your questions so you remember to ask them during your visits  © 2017 [facility] Information is for End User's use only and may not be sold, redistributed or otherwise used for commercial purposes  All illustrations and images included in CareNotes® are the copyrighted property of [employer]  or [employer]  The above information is an  only  It is not intended as medical advice for individual conditions or treatments  Talk to your doctor, nurse or pharmacist before following any medical regimen to see if it is safe and effective for you

## 2019-10-15 ENCOUNTER — ULTRASOUND (OUTPATIENT)
Dept: PERINATAL CARE | Facility: OTHER | Age: 29
End: 2019-10-15

## 2019-10-15 ENCOUNTER — LAB (OUTPATIENT)
Dept: LAB | Facility: HOSPITAL | Age: 29
End: 2019-10-15

## 2019-10-15 VITALS
HEIGHT: 66 IN | HEART RATE: 108 BPM | WEIGHT: 259.8 LBS | SYSTOLIC BLOOD PRESSURE: 120 MMHG | DIASTOLIC BLOOD PRESSURE: 75 MMHG | BODY MASS INDEX: 41.75 KG/M2

## 2019-10-15 DIAGNOSIS — Z3A.36 36 WEEKS GESTATION OF PREGNANCY: ICD-10-CM

## 2019-10-15 DIAGNOSIS — O99.210 MATERNAL MORBID OBESITY, ANTEPARTUM (HCC): Primary | ICD-10-CM

## 2019-10-15 DIAGNOSIS — R39.15 URINARY URGENCY: ICD-10-CM

## 2019-10-15 DIAGNOSIS — E66.01 MATERNAL MORBID OBESITY, ANTEPARTUM (HCC): Primary | ICD-10-CM

## 2019-10-15 LAB
BACTERIA UR QL AUTO: ABNORMAL /HPF
BILIRUB UR QL STRIP: NEGATIVE
CLARITY UR: CLEAR
COLOR UR: YELLOW
GLUCOSE UR STRIP-MCNC: NEGATIVE MG/DL
HGB UR QL STRIP.AUTO: ABNORMAL
HYALINE CASTS #/AREA URNS LPF: ABNORMAL /LPF
KETONES UR STRIP-MCNC: NEGATIVE MG/DL
LEUKOCYTE ESTERASE UR QL STRIP: NEGATIVE
MUCOUS THREADS UR QL AUTO: ABNORMAL
NITRITE UR QL STRIP: NEGATIVE
NON-SQ EPI CELLS URNS QL MICRO: ABNORMAL /HPF
PH UR STRIP.AUTO: 7.5 [PH]
PROT UR STRIP-MCNC: NEGATIVE MG/DL
RBC #/AREA URNS AUTO: ABNORMAL /HPF
SP GR UR STRIP.AUTO: 1.01 (ref 1–1.03)
UROBILINOGEN UR QL STRIP.AUTO: 0.2 E.U./DL
WBC #/AREA URNS AUTO: ABNORMAL /HPF

## 2019-10-15 PROCEDURE — 76815 OB US LIMITED FETUS(S): CPT | Performed by: OBSTETRICS & GYNECOLOGY

## 2019-10-15 PROCEDURE — 59025 FETAL NON-STRESS TEST: CPT | Performed by: OBSTETRICS & GYNECOLOGY

## 2019-10-15 PROCEDURE — 87086 URINE CULTURE/COLONY COUNT: CPT

## 2019-10-15 PROCEDURE — 99212 OFFICE O/P EST SF 10 MIN: CPT | Performed by: OBSTETRICS & GYNECOLOGY

## 2019-10-15 PROCEDURE — 81001 URINALYSIS AUTO W/SCOPE: CPT | Performed by: OBSTETRICS & GYNECOLOGY

## 2019-10-15 NOTE — PROGRESS NOTES
The patient was seen today for an ultrasound and NST  Please see ultrasound report (located under OB Procedures tab) for additional details  Lissette Braun states that she has urgency  She denies any contractions, vaginal bleeding  She is able to urinate normally  She states that it started this morning  We discussed that this could be related to normal physiologic changes in the 3rd trimester with regard to her pregnancy or could be related to early symptoms of a urinary tract infection  I provided the patient with a requisition to complete a urinalysis and urine culture  She is seeing her primary obstetrician  Please note, in addition to the time spent discussing the results of the ultrasound, I spent approximately 10 minutes of face-to-face time with the patient, greater than 50% of which was spent in counseling and the coordination of care for this patient

## 2019-10-17 ENCOUNTER — TELEPHONE (OUTPATIENT)
Dept: PERINATAL CARE | Facility: OTHER | Age: 29
End: 2019-10-17

## 2019-10-17 LAB — BACTERIA UR CULT: NORMAL

## 2019-10-17 NOTE — TELEPHONE ENCOUNTER
----- Message from Kendell Darnell MD sent at 10/17/2019 10:30 AM EDT -----  I have reviewed the labs which are normal in pregnancy  Please contact the patient and notify her of the normal results if she has not reviewed them in [address]     Thank you

## 2019-10-17 NOTE — TELEPHONE ENCOUNTER
Spoke with Mack Jansen, result of urine culture provided, she verbalized understanding and had no questions at this time

## 2019-10-22 ENCOUNTER — ULTRASOUND (OUTPATIENT)
Dept: PERINATAL CARE | Facility: OTHER | Age: 29
End: 2019-10-22

## 2019-10-22 VITALS
WEIGHT: 259.2 LBS | HEART RATE: 101 BPM | SYSTOLIC BLOOD PRESSURE: 103 MMHG | DIASTOLIC BLOOD PRESSURE: 71 MMHG | BODY MASS INDEX: 41.66 KG/M2 | HEIGHT: 66 IN

## 2019-10-22 DIAGNOSIS — E66.01 MATERNAL MORBID OBESITY, ANTEPARTUM (HCC): Primary | ICD-10-CM

## 2019-10-22 DIAGNOSIS — Z3A.37 37 WEEKS GESTATION OF PREGNANCY: ICD-10-CM

## 2019-10-22 DIAGNOSIS — O99.210 MATERNAL MORBID OBESITY, ANTEPARTUM (HCC): Primary | ICD-10-CM

## 2019-10-22 PROCEDURE — 59025 FETAL NON-STRESS TEST: CPT | Performed by: OBSTETRICS & GYNECOLOGY

## 2019-10-22 PROCEDURE — 76815 OB US LIMITED FETUS(S): CPT | Performed by: OBSTETRICS & GYNECOLOGY

## 2019-10-22 NOTE — PATIENT INSTRUCTIONS
If you still need to transfer your OB care before delivery, contact info for the OB group in [facility]: (Jennifer Daniel, and Junior Shah):  [address]  [address] [place] [address],[address]  [facility], [address] [place] [address]  Phone: [phone]

## 2019-10-22 NOTE — PROGRESS NOTES
Daily fetal kick count reviewed and emphasized  Patient verbalized understanding of all and was receptive      Michaelle Kehr, RN

## 2019-10-22 NOTE — PROGRESS NOTES
[address]: Ms Latisha Helms was seen today at 37w4d for NST (found under the pregnancy episode) which I reviewed the RN assessment and agree, and PRIYANKA (see ultrasound report under OB procedures tab)  Please don't hesitate to contact our office with any concerns or questions    Carlos Eduardo Hummel MD

## 2019-10-29 ENCOUNTER — ULTRASOUND (OUTPATIENT)
Dept: PERINATAL CARE | Facility: OTHER | Age: 29
End: 2019-10-29

## 2019-10-29 VITALS
BODY MASS INDEX: 41.78 KG/M2 | DIASTOLIC BLOOD PRESSURE: 82 MMHG | HEART RATE: 96 BPM | WEIGHT: 260 LBS | SYSTOLIC BLOOD PRESSURE: 124 MMHG | HEIGHT: 66 IN

## 2019-10-29 DIAGNOSIS — R39.15 URINARY URGENCY: ICD-10-CM

## 2019-10-29 DIAGNOSIS — Z3A.38 38 WEEKS GESTATION OF PREGNANCY: ICD-10-CM

## 2019-10-29 DIAGNOSIS — E66.01 MATERNAL MORBID OBESITY, ANTEPARTUM (HCC): Primary | ICD-10-CM

## 2019-10-29 DIAGNOSIS — O99.210 MATERNAL MORBID OBESITY, ANTEPARTUM (HCC): Primary | ICD-10-CM

## 2019-10-29 PROCEDURE — 59025 FETAL NON-STRESS TEST: CPT | Performed by: OBSTETRICS & GYNECOLOGY

## 2019-10-29 PROCEDURE — 76815 OB US LIMITED FETUS(S): CPT | Performed by: OBSTETRICS & GYNECOLOGY

## 2019-10-29 NOTE — PROGRESS NOTES
Fetal testing is reactive and reassuring  She reports she is scheduled for  in 1 week so no further testing was recommended at this time       Melvina Antony MD

## 2019-10-29 NOTE — LETTER
2019     Carrington Reynaga MD  [id number]    Patient: Steff Goodrich   YOB: 1990   Date of Visit: 10/29/2019       Dear Dr Sophia Madden:    Thank you for referring Linda Terrazas to me for evaluation  Below are my notes for this consultation  If you have questions, please do not hesitate to call me  I look forward to following your patient along with you  Sincerely,        Konrad Lorenzo MD        CC: No Recipients  Konrad Lorenzo MD  10/29/2019  6:10 PM  Incomplete  Fetal testing is reactive and reassuring  She reports she is scheduled for  in 1 week so no further testing was recommended at this time       Konrad Lorenzo MD

## 2019-10-29 NOTE — PROGRESS NOTES
Daily fetal kick count reviewed and emphasized  Patient verbalized understanding of all and was receptive      Nurys Bhatti RN

## 2019-11-05 ENCOUNTER — ANESTHESIA EVENT (INPATIENT)
Dept: LABOR AND DELIVERY | Facility: HOSPITAL | Age: 29
DRG: 540 | End: 2019-11-05
Payer: COMMERCIAL

## 2019-11-05 ENCOUNTER — HOSPITAL ENCOUNTER (INPATIENT)
Facility: HOSPITAL | Age: 29
LOS: 2 days | Discharge: HOME | DRG: 540 | End: 2019-11-07
Attending: OBSTETRICS & GYNECOLOGY | Admitting: OBSTETRICS & GYNECOLOGY
Payer: COMMERCIAL

## 2019-11-05 DIAGNOSIS — Z98.891 STATUS POST REPEAT LOW TRANSVERSE CESAREAN SECTION: Primary | ICD-10-CM

## 2019-11-05 PROBLEM — Z3A.39 39 WEEKS GESTATION OF PREGNANCY: Status: ACTIVE | Noted: 2019-08-26

## 2019-11-05 LAB
ABO GROUP BLD: NORMAL
ALBUMIN SERPL BCP-MCNC: 2.7 G/DL (ref 3.5–5)
ALP SERPL-CCNC: 111 U/L (ref 46–116)
ALT SERPL W P-5'-P-CCNC: 13 U/L (ref 12–78)
ANION GAP SERPL CALCULATED.3IONS-SCNC: 12 MMOL/L (ref 4–13)
AST SERPL W P-5'-P-CCNC: 31 U/L (ref 5–45)
BACTERIA UR QL AUTO: ABNORMAL /HPF
BASE EXCESS BLDCOA CALC-SCNC: 1.1 MMOL/L (ref 3–11)
BASE EXCESS BLDCOV CALC-SCNC: -0.9 MMOL/L (ref 1–9)
BILIRUB SERPL-MCNC: 0.27 MG/DL (ref 0.2–1)
BILIRUB UR QL STRIP: NEGATIVE
BLD GP AB SCN SERPL QL: NEGATIVE
BUN SERPL-MCNC: 8 MG/DL (ref 5–25)
CALCIUM SERPL-MCNC: 9.8 MG/DL (ref 8.3–10.1)
CHLORIDE SERPL-SCNC: 102 MMOL/L (ref 100–108)
CLARITY UR: ABNORMAL
CO2 SERPL-SCNC: 21 MMOL/L (ref 21–32)
COLOR UR: YELLOW
CREAT SERPL-MCNC: 0.62 MG/DL (ref 0.6–1.3)
ERYTHROCYTE [DISTWIDTH] IN BLOOD BY AUTOMATED COUNT: 12.8 % (ref 11.6–15.1)
GFR SERPL CREATININE-BSD FRML MDRD: 122 ML/MIN/1.73SQ M
GLUCOSE SERPL-MCNC: 83 MG/DL (ref 65–140)
GLUCOSE UR STRIP-MCNC: NEGATIVE MG/DL
HCO3 BLDCOA-SCNC: 27.5 MMOL/L (ref 17.3–27.3)
HCO3 BLDCOV-SCNC: 23.5 MMOL/L (ref 12.2–28.6)
HCT VFR BLD AUTO: 31.6 % (ref 34.8–46.1)
HGB BLD-MCNC: 10.8 G/DL (ref 11.5–15.4)
HGB UR QL STRIP.AUTO: ABNORMAL
KETONES UR STRIP-MCNC: ABNORMAL MG/DL
LEUKOCYTE ESTERASE UR QL STRIP: ABNORMAL
MCH RBC QN AUTO: 31.1 PG (ref 26.8–34.3)
MCHC RBC AUTO-ENTMCNC: 34.2 G/DL (ref 31.4–37.4)
MCV RBC AUTO: 91 FL (ref 82–98)
NITRITE UR QL STRIP: NEGATIVE
NON-SQ EPI CELLS URNS QL MICRO: ABNORMAL /HPF
O2 CT VFR BLDCOA CALC: 7.3 ML/DL
OXYHGB MFR BLDCOA: 30.6 %
OXYHGB MFR BLDCOV: 54.5 %
PCO2 BLDCOA: 49.6 MM[HG] (ref 30–60)
PCO2 BLDCOV: 38.4 MM HG (ref 27–43)
PH BLDCOA: 7.36 [PH] (ref 7.23–7.43)
PH BLDCOV: 7.41 [PH] (ref 7.19–7.49)
PH UR STRIP.AUTO: 8 [PH]
PLATELET # BLD AUTO: 239 THOUSANDS/UL (ref 149–390)
PMV BLD AUTO: 8.9 FL (ref 8.9–12.7)
PO2 BLDCOA: 15.9 MM HG (ref 5–25)
PO2 BLDCOV: 21.9 MM HG (ref 15–45)
POTASSIUM SERPL-SCNC: 3.6 MMOL/L (ref 3.5–5.3)
PROT SERPL-MCNC: 7 G/DL (ref 6.4–8.2)
PROT UR STRIP-MCNC: NEGATIVE MG/DL
RBC # BLD AUTO: 3.47 MILLION/UL (ref 3.81–5.12)
RBC #/AREA URNS AUTO: ABNORMAL /HPF
RH BLD: NEGATIVE
SAO2 % BLDCOV: 12.2 ML/DL
SODIUM SERPL-SCNC: 135 MMOL/L (ref 136–145)
SP GR UR STRIP.AUTO: 1.01 (ref 1–1.03)
SPECIMEN EXPIRATION DATE: NORMAL
UROBILINOGEN UR QL STRIP.AUTO: 0.2 E.U./DL
WBC # BLD AUTO: 7.98 THOUSAND/UL (ref 4.31–10.16)
WBC #/AREA URNS AUTO: ABNORMAL /HPF

## 2019-11-05 PROCEDURE — 99024 POSTOP FOLLOW-UP VISIT: CPT | Performed by: OBSTETRICS & GYNECOLOGY

## 2019-11-05 PROCEDURE — NC001 PR NO CHARGE: Performed by: OBSTETRICS & GYNECOLOGY

## 2019-11-05 PROCEDURE — 59514 CESAREAN DELIVERY ONLY: CPT | Performed by: OBSTETRICS & GYNECOLOGY

## 2019-11-05 PROCEDURE — 81001 URINALYSIS AUTO W/SCOPE: CPT | Performed by: OBSTETRICS & GYNECOLOGY

## 2019-11-05 PROCEDURE — 4A1HXCZ MONITORING OF PRODUCTS OF CONCEPTION, CARDIAC RATE, EXTERNAL APPROACH: ICD-10-PCS | Performed by: OBSTETRICS & GYNECOLOGY

## 2019-11-05 PROCEDURE — 86901 BLOOD TYPING SEROLOGIC RH(D): CPT | Performed by: STUDENT IN AN ORGANIZED HEALTH CARE EDUCATION/TRAINING PROGRAM

## 2019-11-05 PROCEDURE — 82805 BLOOD GASES W/O2 SATURATION: CPT | Performed by: OBSTETRICS & GYNECOLOGY

## 2019-11-05 PROCEDURE — 80053 COMPREHEN METABOLIC PANEL: CPT | Performed by: OBSTETRICS & GYNECOLOGY

## 2019-11-05 PROCEDURE — 86900 BLOOD TYPING SEROLOGIC ABO: CPT | Performed by: STUDENT IN AN ORGANIZED HEALTH CARE EDUCATION/TRAINING PROGRAM

## 2019-11-05 PROCEDURE — 86592 SYPHILIS TEST NON-TREP QUAL: CPT | Performed by: STUDENT IN AN ORGANIZED HEALTH CARE EDUCATION/TRAINING PROGRAM

## 2019-11-05 PROCEDURE — 85027 COMPLETE CBC AUTOMATED: CPT | Performed by: STUDENT IN AN ORGANIZED HEALTH CARE EDUCATION/TRAINING PROGRAM

## 2019-11-05 PROCEDURE — 86850 RBC ANTIBODY SCREEN: CPT | Performed by: STUDENT IN AN ORGANIZED HEALTH CARE EDUCATION/TRAINING PROGRAM

## 2019-11-05 RX ORDER — ONDANSETRON 2 MG/ML
4 INJECTION INTRAMUSCULAR; INTRAVENOUS EVERY 8 HOURS PRN
Status: DISCONTINUED | OUTPATIENT
Start: 2019-11-06 | End: 2019-11-07 | Stop reason: HOSPADM

## 2019-11-05 RX ORDER — CEFAZOLIN SODIUM 2 G/50ML
2000 SOLUTION INTRAVENOUS ONCE
Status: DISCONTINUED | OUTPATIENT
Start: 2019-11-05 | End: 2019-11-05

## 2019-11-05 RX ORDER — SIMETHICONE 80 MG
80 TABLET,CHEWABLE ORAL 4 TIMES DAILY PRN
Status: DISCONTINUED | OUTPATIENT
Start: 2019-11-05 | End: 2019-11-07 | Stop reason: HOSPADM

## 2019-11-05 RX ORDER — KETOROLAC TROMETHAMINE 30 MG/ML
30 INJECTION, SOLUTION INTRAMUSCULAR; INTRAVENOUS EVERY 6 HOURS
Status: COMPLETED | OUTPATIENT
Start: 2019-11-05 | End: 2019-11-06

## 2019-11-05 RX ORDER — OXYCODONE HYDROCHLORIDE AND ACETAMINOPHEN 5; 325 MG/1; MG/1
2 TABLET ORAL EVERY 6 HOURS PRN
Status: DISCONTINUED | OUTPATIENT
Start: 2019-11-05 | End: 2019-11-07 | Stop reason: HOSPADM

## 2019-11-05 RX ORDER — DOCUSATE SODIUM 100 MG/1
100 CAPSULE, LIQUID FILLED ORAL 2 TIMES DAILY
Status: DISCONTINUED | OUTPATIENT
Start: 2019-11-05 | End: 2019-11-07 | Stop reason: HOSPADM

## 2019-11-05 RX ORDER — OXYTOCIN/RINGER'S LACTATE 30/500 ML
62.5 PLASTIC BAG, INJECTION (ML) INTRAVENOUS CONTINUOUS
Status: DISCONTINUED | OUTPATIENT
Start: 2019-11-05 | End: 2019-11-07 | Stop reason: HOSPADM

## 2019-11-05 RX ORDER — CALCIUM CARBONATE 200(500)MG
1000 TABLET,CHEWABLE ORAL DAILY PRN
Status: DISCONTINUED | OUTPATIENT
Start: 2019-11-05 | End: 2019-11-07 | Stop reason: HOSPADM

## 2019-11-05 RX ORDER — DEXAMETHASONE SODIUM PHOSPHATE 4 MG/ML
8 INJECTION, SOLUTION INTRA-ARTICULAR; INTRALESIONAL; INTRAMUSCULAR; INTRAVENOUS; SOFT TISSUE ONCE AS NEEDED
Status: ACTIVE | OUTPATIENT
Start: 2019-11-05 | End: 2019-11-06

## 2019-11-05 RX ORDER — OXYCODONE HYDROCHLORIDE AND ACETAMINOPHEN 5; 325 MG/1; MG/1
2 TABLET ORAL EVERY 4 HOURS PRN
Status: DISCONTINUED | OUTPATIENT
Start: 2019-11-06 | End: 2019-11-07 | Stop reason: HOSPADM

## 2019-11-05 RX ORDER — MAGNESIUM HYDROXIDE/ALUMINUM HYDROXICE/SIMETHICONE 120; 1200; 1200 MG/30ML; MG/30ML; MG/30ML
15 SUSPENSION ORAL EVERY 6 HOURS PRN
Status: DISCONTINUED | OUTPATIENT
Start: 2019-11-05 | End: 2019-11-07 | Stop reason: HOSPADM

## 2019-11-05 RX ORDER — OXYTOCIN/RINGER'S LACTATE 30/500 ML
PLASTIC BAG, INJECTION (ML) INTRAVENOUS CONTINUOUS PRN
Status: DISCONTINUED | OUTPATIENT
Start: 2019-11-05 | End: 2019-11-05 | Stop reason: SURG

## 2019-11-05 RX ORDER — CEFAZOLIN SODIUM 2 G/50ML
SOLUTION INTRAVENOUS AS NEEDED
Status: DISCONTINUED | OUTPATIENT
Start: 2019-11-05 | End: 2019-11-05 | Stop reason: SURG

## 2019-11-05 RX ORDER — ACETAMINOPHEN 325 MG/1
650 TABLET ORAL EVERY 4 HOURS PRN
Status: DISCONTINUED | OUTPATIENT
Start: 2019-11-06 | End: 2019-11-07 | Stop reason: HOSPADM

## 2019-11-05 RX ORDER — DIPHENHYDRAMINE HYDROCHLORIDE 50 MG/ML
25 INJECTION INTRAMUSCULAR; INTRAVENOUS EVERY 6 HOURS PRN
Status: DISCONTINUED | OUTPATIENT
Start: 2019-11-05 | End: 2019-11-05

## 2019-11-05 RX ORDER — DIPHENHYDRAMINE HYDROCHLORIDE 50 MG/ML
25 INJECTION INTRAMUSCULAR; INTRAVENOUS EVERY 6 HOURS PRN
Status: ACTIVE | OUTPATIENT
Start: 2019-11-05 | End: 2019-11-06

## 2019-11-05 RX ORDER — ONDANSETRON 2 MG/ML
4 INJECTION INTRAMUSCULAR; INTRAVENOUS EVERY 8 HOURS PRN
Status: DISCONTINUED | OUTPATIENT
Start: 2019-11-05 | End: 2019-11-05

## 2019-11-05 RX ORDER — SENNOSIDES 8.6 MG
1 TABLET ORAL DAILY
Status: DISCONTINUED | OUTPATIENT
Start: 2019-11-06 | End: 2019-11-07 | Stop reason: HOSPADM

## 2019-11-05 RX ORDER — BUPIVACAINE HYDROCHLORIDE 7.5 MG/ML
INJECTION, SOLUTION INTRASPINAL AS NEEDED
Status: DISCONTINUED | OUTPATIENT
Start: 2019-11-05 | End: 2019-11-05 | Stop reason: SURG

## 2019-11-05 RX ORDER — NYSTATIN AND TRIAMCINOLONE ACETONIDE 100000; 1 [USP'U]/G; MG/G
OINTMENT TOPICAL 2 TIMES DAILY
Status: DISCONTINUED | OUTPATIENT
Start: 2019-11-05 | End: 2019-11-07 | Stop reason: HOSPADM

## 2019-11-05 RX ORDER — NALOXONE HYDROCHLORIDE 0.4 MG/ML
0.1 INJECTION, SOLUTION INTRAMUSCULAR; INTRAVENOUS; SUBCUTANEOUS
Status: ACTIVE | OUTPATIENT
Start: 2019-11-05 | End: 2019-11-06

## 2019-11-05 RX ORDER — MORPHINE SULFATE 0.5 MG/ML
INJECTION, SOLUTION EPIDURAL; INTRATHECAL; INTRAVENOUS AS NEEDED
Status: DISCONTINUED | OUTPATIENT
Start: 2019-11-05 | End: 2019-11-05 | Stop reason: SURG

## 2019-11-05 RX ORDER — PHENYLEPHRINE HCL IN 0.9% NACL 1 MG/10 ML
SYRINGE (ML) INTRAVENOUS
Status: DISPENSED
Start: 2019-11-05 | End: 2019-11-06

## 2019-11-05 RX ORDER — OXYCODONE HYDROCHLORIDE AND ACETAMINOPHEN 5; 325 MG/1; MG/1
1 TABLET ORAL EVERY 4 HOURS PRN
Status: DISCONTINUED | OUTPATIENT
Start: 2019-11-06 | End: 2019-11-07 | Stop reason: HOSPADM

## 2019-11-05 RX ORDER — METOCLOPRAMIDE HYDROCHLORIDE 5 MG/ML
5 INJECTION INTRAMUSCULAR; INTRAVENOUS EVERY 6 HOURS PRN
Status: ACTIVE | OUTPATIENT
Start: 2019-11-05 | End: 2019-11-06

## 2019-11-05 RX ORDER — KETOROLAC TROMETHAMINE 30 MG/ML
INJECTION, SOLUTION INTRAMUSCULAR; INTRAVENOUS AS NEEDED
Status: DISCONTINUED | OUTPATIENT
Start: 2019-11-05 | End: 2019-11-05 | Stop reason: SURG

## 2019-11-05 RX ORDER — ONDANSETRON 2 MG/ML
4 INJECTION INTRAMUSCULAR; INTRAVENOUS EVERY 4 HOURS PRN
Status: ACTIVE | OUTPATIENT
Start: 2019-11-05 | End: 2019-11-06

## 2019-11-05 RX ORDER — SODIUM CHLORIDE, SODIUM LACTATE, POTASSIUM CHLORIDE, CALCIUM CHLORIDE 600; 310; 30; 20 MG/100ML; MG/100ML; MG/100ML; MG/100ML
125 INJECTION, SOLUTION INTRAVENOUS CONTINUOUS
Status: DISCONTINUED | OUTPATIENT
Start: 2019-11-05 | End: 2019-11-07 | Stop reason: HOSPADM

## 2019-11-05 RX ORDER — NYSTATIN 100000 [USP'U]/G
POWDER TOPICAL 2 TIMES DAILY
Status: DISCONTINUED | OUTPATIENT
Start: 2019-11-05 | End: 2019-11-07 | Stop reason: HOSPADM

## 2019-11-05 RX ORDER — MORPHINE SULFATE 0.5 MG/ML
INJECTION, SOLUTION EPIDURAL; INTRATHECAL; INTRAVENOUS
Status: COMPLETED
Start: 2019-11-05 | End: 2019-11-05

## 2019-11-05 RX ORDER — IBUPROFEN 600 MG/1
600 TABLET ORAL EVERY 6 HOURS PRN
Status: DISCONTINUED | OUTPATIENT
Start: 2019-11-05 | End: 2019-11-07 | Stop reason: HOSPADM

## 2019-11-05 RX ADMIN — SODIUM CHLORIDE, SODIUM LACTATE, POTASSIUM CHLORIDE, AND CALCIUM CHLORIDE 125 ML/HR: .6; .31; .03; .02 INJECTION, SOLUTION INTRAVENOUS at 13:30

## 2019-11-05 RX ADMIN — NYSTATIN: 100000 POWDER TOPICAL at 18:04

## 2019-11-05 RX ADMIN — SODIUM CHLORIDE, SODIUM LACTATE, POTASSIUM CHLORIDE, AND CALCIUM CHLORIDE: .6; .31; .03; .02 INJECTION, SOLUTION INTRAVENOUS at 14:38

## 2019-11-05 RX ADMIN — KETOROLAC TROMETHAMINE 30 MG: 30 INJECTION, SOLUTION INTRAMUSCULAR at 15:11

## 2019-11-05 RX ADMIN — ONDANSETRON 4 MG: 2 INJECTION INTRAMUSCULAR; INTRAVENOUS at 14:17

## 2019-11-05 RX ADMIN — BUPIVACAINE HYDROCHLORIDE IN DEXTROSE 1.6 ML: 7.5 INJECTION, SOLUTION SUBARACHNOID at 14:20

## 2019-11-05 RX ADMIN — SODIUM CHLORIDE, SODIUM LACTATE, POTASSIUM CHLORIDE, AND CALCIUM CHLORIDE: .6; .31; .03; .02 INJECTION, SOLUTION INTRAVENOUS at 15:29

## 2019-11-05 RX ADMIN — CALCIUM CARBONATE (ANTACID) CHEW TAB 500 MG 1000 MG: 500 CHEW TAB at 17:01

## 2019-11-05 RX ADMIN — SODIUM CHLORIDE, SODIUM LACTATE, POTASSIUM CHLORIDE, AND CALCIUM CHLORIDE 999 ML/HR: .6; .31; .03; .02 INJECTION, SOLUTION INTRAVENOUS at 12:50

## 2019-11-05 RX ADMIN — DOCUSATE SODIUM 100 MG: 100 CAPSULE, LIQUID FILLED ORAL at 18:03

## 2019-11-05 RX ADMIN — SODIUM CHLORIDE, SODIUM LACTATE, POTASSIUM CHLORIDE, AND CALCIUM CHLORIDE 125 ML/HR: .6; .31; .03; .02 INJECTION, SOLUTION INTRAVENOUS at 15:53

## 2019-11-05 RX ADMIN — PHENYLEPHRINE HYDROCHLORIDE 100 MCG: 10 INJECTION INTRAVENOUS at 14:23

## 2019-11-05 RX ADMIN — CEFAZOLIN SODIUM 2000 MG: 2 SOLUTION INTRAVENOUS at 14:19

## 2019-11-05 RX ADMIN — NYSTATIN AND TRIAMCINOLONE ACETONIDE: 100000; 1 OINTMENT TOPICAL at 18:03

## 2019-11-05 RX ADMIN — MORPHINE SULFATE 0.1 MG: 0.5 INJECTION, SOLUTION EPIDURAL; INTRATHECAL; INTRAVENOUS at 14:16

## 2019-11-05 RX ADMIN — Medication 250 MILLI-UNITS/MIN: at 14:43

## 2019-11-05 RX ADMIN — KETOROLAC TROMETHAMINE 30 MG: 30 INJECTION, SOLUTION INTRAMUSCULAR at 21:17

## 2019-11-05 NOTE — DISCHARGE INSTRUCTIONS
Section   WHAT YOU SHOULD KNOW:   A  delivery, or , is abdominal surgery to deliver your baby  There are many reasons you may need a   · A  may be scheduled before labor if you had a  with your last baby  It may be scheduled if your baby is not positioned normally, or you are pregnant with more than 1 baby  · Your caregiver may perform an emergency  during labor to prevent life-threatening complications for you or your baby  A  may be done if your cervix does not dilate after several hours of active labor  · Other reasons for a  include maternal infections and problems with the placenta  AFTER YOU LEAVE:   Medicines:   · Prescription pain medicine  may be given  Ask how to take this medicine safely  · Acetaminophen  decreases pain and fever  It is available without a doctor's order  Ask how much to take and how often to take it  Follow directions  Acetaminophen can cause liver damage if not taken correctly  · NSAIDs  help decrease swelling and pain or fever  This medicine is available with or without a doctor's order  NSAIDs can cause stomach bleeding or kidney problems in certain people  If you take blood thinner medicine, always ask your obstetrician if NSAIDs are safe for you  Always read the medicine label and follow directions  · Take your medicine as directed  Contact your obstetrician (OB) if you think your medicine is not helping or if you have side effects  Tell him if you are allergic to any medicine  Keep a list of the medicines, vitamins, and herbs you take  Include the amounts, and when and why you take them  Bring the list or the pill bottles to follow-up visits  Carry your medicine list with you in case of an emergency  Follow up with your OB as directed: You may need to return to have your stitches or staples removed  Write down your questions so you remember to ask them during your visits    Wound care:  Carefully wash your wound with soap and water every day  Keep your wound clean and dry  Wear loose, comfortable clothes that do not rub against your wound  Ask your OB about bathing and showering  Drink plenty of liquids: You can lower your risk for a blood clot if you drink plenty of liquids  Ask how much liquid to drink each day and which liquids are best for you  Limit activity until you have fully recovered from surgery:   · Ask when it is safe for you to drive, walk up stairs, lift heavy objects, and have sex  · Ask when it is okay to exercise, and what types of exercise to do  Start slowly and do more as you get stronger  Contact your OB if:   · You have heavy vaginal bleeding that fills 1 or more sanitary pads in 1 hour  · You have a fever  · Your incision is swollen, red, or draining pus  · You have questions or concerns about yourself or your baby  Seek care immediately or call 911 if:   · Blood soaks through your bandage  · Your stitches come apart  · You feel lightheaded, short of breath, and have chest pain  · You cough up blood  · Your arm or leg feels warm, tender, and painful  It may look swollen and red  © 2014 [address] is for End User's use only and may not be sold, redistributed or otherwise used for commercial purposes  All illustrations and images included in CareNotes® are the copyrighted property of [employer]  or [employer]  The above information is an  only  It is not intended as medical advice for individual conditions or treatments  Talk to your doctor, nurse or pharmacist before following any medical regimen to see if it is safe and effective for you

## 2019-11-05 NOTE — OP NOTE
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    Procedure(s) (LRB):   SECTION () REPEAT (N/A)    Specimen(s):  Arterial venous blood gases  Cord blood  Placenta    Quantified Blood Loss:   707 ml    Drains:  Urethral Catheter (Active)   Site Assessment Clean;Skin intact 2019  2:19 PM   Collection Container Standard drainage bag 2019  2:19 PM   Number of days: 0     Anesthesia Type:   Spinal anesthesia    Operative Indications:  Prior  section    Operative Findings:  1  Delivery of viable female  at 1442, weight 8lb 6oz, Apgars 9 and 9 at 1 minute and 5 minutes respectively  2  Delivery of intact placenta with centrally inserted three-vessel cord at 1445  3  Arterial blood gas pH 7 361, base excess 1 1, venous blood gas pH 7 42, base excess -0 9  4  Uterus appears grossly normal with dense bladder adhesion to the anterior uterus  5  Grossly normal bilateral fallopian tubes and ovaries    Complications:   None    Procedure and Technique:  The patient was taken to the operating room where correct patient and correct procedure were confirmed  Spinal anesthesia was adequately established an 2gm Ancef was given for preoperative prophylaxis  The patient was then placed in a supine position with a left lateral tilt  A vaginal preparation was completed using betadine  Gupta catheter was placed in aseptic fashion  Fetal heart tones were noted to be normal  The patient was then prepped and draped in the usual sterile fashion  A time out was performed per protocol  A Pfannenstiel incision was made in the skin with a surgical scalpel   Sharp and blunt dissection was used to reach the level of the rectus fascia  Bovie electrocautery was utilitzed for hemostasis during this process  The fascia was incised transversely at the midline and the fascial incision was extended bilaterally using sharp dissection using Marie scissors  The superior edge of the fascial incision was grasped with Kocher clamps, tented up and the underlying rectus muscles were dissected off bluntly  The inferior edge of the fascial incision was then dissection off the rectus muscles with blunt and sharp dissection  The rectus muscles were then divided at the midline  The peritoneum was identified, tented up at its upper margin taking care to avoid the bladder, and then entered with blunt dissection  The peritoneal incision and rectus muscle were extended bilaterally bluntly with gentle traction  Filmy adhesions were noted and lysed with bovie electrocautery  A bladder adhesion to the anterior uterus was taken down using metzenbaum scissors  An John O retractor was inserted  Then, a transverse incision was made in the lower uterine segment using a new surgical blade  The uterine incision was extended cephalad and caudal using blunt dissection  The amniotic sac was entered bluntly and the amniotic fluid was noted to be Clear  The surgeon's hand was placed into the uterine cavity  The fetus was noted to be in cephalic presentation, and the presenting part was elevated and delivered through the uterine incision with the assistance of fundal pressure  No nuchal cord was identified  The infant's oral and nasal passages were bulb suctioned  After delivery the cord was doubly clamped and cut  The infant was then passed off the table to the awaiting  staff  Venous and arterial blood gas, cord blood, and portion of cord was obtained for analysis and routine blood testing  The placenta was then delivered   The placenta was noted to be intact with a centrally inserted three-vessel cord   Oxytocin was administered by IV infusion to enhance uterine contraction  The uterus was exteriorized and cleared of all clots and debris by blunt curretage with a moist lap sponge  The uterine incision was reapproximated using a 0 Vicryl in a running locked fashion  A second vertical imbricating stitch with 0 Vicryl was applied  The uterine incision was examined and noted to be hemostatic  The uterus was replaced into the abdomen and the pericolic gutters were cleared of all clots  The uterine incision was once again reexamined and noted to have bleeding mid incision which was suture ligated with a single figure of 8 stitch and was noted to be hemostatic thereafter  Surgicel was placed atop incision  The fascia was re approximated using 0 Vicryl in a running nonlocked fashion  The subcutaneous tissue was irrigated and cleared of all clots and debris  Good hemostasis was achieved with Bovie electrocautery  The subcutaneous  tissue was reapproximated with 2-0 Plain suture  The skin incision was closed with 4-0 Vicryl  Good hemostasis was noted  Exofin was placed sterilely placed on top of the skin incision as a surgical dressing  All needle, sponge, and instrument counts were noted to be correct x 2 at the end of the procedure  The patient was then cleansed and transferred to the recovery room  Overall, the patient tolerated the procedure well and is currently in stable condition in the PACU with her   Dr Bria Benavides was present for the entire procedure        Patient Disposition:  PACU     SIGNATURE: Dylan Crabtree MD  DATE: 2019  TIME: 4:00 PM

## 2019-11-05 NOTE — H&P
H&P Exam - Obstetrics   Caryle Shawl 34 y o  female MRN: [id number]  Unit/Bed#: L&D 329-02 Encounter: [id number]      History of Present Illness     Chief Complaint: Scheduled repeat  setion    HPI:  Caryle Shawl is a 34 y o   female with an ANIL of 2019, by Ultrasound at 39w3d weeks gestation who is being admitted for scheduled repeat  section  Contractions: Deneis  Vaginal Bleeding:Denies  Loss of Fluid: Denies  Fetal Movement: Reports good fetal movement    Patient is a patient of Dr Cary Ham who is being covered by Clinic service  PREGNANCY COMPLICATIONS:   H/o prior  section  GBS positive  Obesity  Familial history of Greg's disease  Rh negative status    OB History    Para Term  AB Living   2 1 1 0 0 1   SAB TAB Ectopic Multiple Live Births                  # Outcome Date GA Lbr Bob/2nd Weight Sex Delivery Anes PTL Lv   2 Current            1 Term              Baby complications/comments:   Vertex  Posterior placenta   EFW 8-9 lbs    Review of Systems   Constitutional: Negative for chills and fever  Eyes: Negative for visual disturbance  Respiratory: Negative for chest tightness, shortness of breath and wheezing  Cardiovascular: Negative for chest pain, palpitations and leg swelling  Gastrointestinal: Negative for abdominal pain, constipation, diarrhea, nausea and vomiting  Genitourinary: Negative for dysuria, flank pain, frequency, hematuria and urgency  Musculoskeletal: Negative for arthralgias and myalgias  Neurological: Negative for dizziness, weakness, light-headedness and headaches         Historical Information   Past Medical History:   Diagnosis Date    Anemia     Post partum depression      Past Surgical History:   Procedure Laterality Date    APPENDECTOMY       SECTION       Social History   Social History     Substance and Sexual Activity   Alcohol Use No     Social History     Substance and Sexual Activity   Drug Use Yes    Types: Marijuana    Comment: For sleeping and nausea     Social History     Tobacco Use   Smoking Status Former Smoker    Last attempt to quit: 2017    Years since quittin 3   Smokeless Tobacco Never Used     Family History:   Family History   Problem Relation Age of Onset    Bath's disease Family        Meds/Allergies    {  Medications Prior to Admission   Medication    ferrous sulfate 325 (65 Fe) mg tablet    Prenatal Multivit-Min-Fe-FA (PRENATAL VITAMINS) 0 8 MG tablet      No Known Allergies    OBJECTIVE:    Vitals:   /80 (BP Location: Left arm)   Pulse 105   Temp 98 3 °F (36 8 °C) (Oral)   Resp 20   Ht 5' 6" (1 676 m)   Wt 115 kg (254 lb)   LMP  (LMP Unknown)   Breastfeeding? Yes   BMI 41 00 kg/m²   Body mass index is 41 kg/m²  Physical Exam   Constitutional: She is oriented to person, place, and time  She appears well-developed and well-nourished  No distress  HENT:   Head: Normocephalic and atraumatic  Neck: Normal range of motion  Neck supple  Cardiovascular: Normal rate, regular rhythm and normal heart sounds  Exam reveals no gallop and no friction rub  No murmur heard  Pulmonary/Chest: Effort normal and breath sounds normal  No respiratory distress  She has no wheezes  She has no rales  Abdominal: Soft  There is no tenderness  There is no rebound and no guarding  Genitourinary: Vagina normal    Neurological: She is alert and oriented to person, place, and time  Skin: Skin is warm and dry  She is not diaphoretic  Psychiatric: She has a normal mood and affect  Her behavior is normal    Vitals reviewed      FHT:  Baseline Rate: 120 bpm  Variability: Minimal < 6 bpm  Accelerations: 15 x 15 or greater, At variable times  Decelerations: None  TOCO:   Contraction Frequency (minutes): irregular    Prenatal Labs:   Blood type: O negative  Antibody Screen: Negative  Rubella: Immune   HIV: Non reractive  RPR: Non reactive  Hep B: Non reactive  Diabetes Screen: 165  GTT: 92/ 170/ 129/ 66  GBS: Positive    Assessment/Plan     ASSESSMENT:  35 yo  at 39w4d weeks gestation for repeat  section  PLAN:   1) Admit to Labor and Delivery   2) Admit labs: CBC, RPR, Blood Type   3) Ancef 2g IV once prior to skin incision   4) FEN: NPO,  cc/hr   5) DVT Ppx: SCD       This patient will be an INPATIENT  and I certify the anticipated length of stay is >2 Midnights        Lizeth Barber MD  2019  1:04 PM

## 2019-11-05 NOTE — LACTATION NOTE
This note was copied from a baby's chart  Met with mother  Provided mother with Ready, Set, Baby booklet  Discussed Skin to Skin contact an benefits to mom and baby  Talked about the delay of the first bath until baby has adjusted  Spoke about the benefits of rooming in  Feeding on cue and what that means for recognizing infant's hunger  Avoidance of pacifiers for the first month discussed  Talked about exclusive breastfeeding for the first 6 months  Positioning and latch reviewed as well as showing images of other feeding positions  Discussed the properties of a good latch in any position  Reviewed hand/manual expression  Discussed s/s that baby is getting enough milk and some s/s that breastfeeding dyad may need further help  Gave information on common concerns, what to expect the first few weeks after delivery, preparing for other caregivers, and how partners can help  Resources for support also provided  Mother verbalized first breastfeeding went well  She has a lot of questions because she hasn't breast fed before  I discussed on demand feeding and length of feedings  I was reviewing booklet when her older daughter came in to see baby for the first time, so mom asked me to go over the booklet at a later time  Enc to call for assistance as needed,phone # given

## 2019-11-05 NOTE — ANESTHESIA PREPROCEDURE EVALUATION
Review of Systems/Medical History          Cardiovascular  Exercise tolerance (METS): >4,     Pulmonary       GI/Hepatic    GERD ,        Negative  ROS        Endo/Other  Negative endo/other ROS      GYN  Currently pregnant , Prior pregnancy/OB history : 2 Parity: 1,          Hematology  Anemia ,     Musculoskeletal  Negative musculoskeletal ROS        Neurology  Negative neurology ROS      Psychology   Depression ,              Physical Exam    Airway    Mallampati score: II  TM Distance: >3 FB  Neck ROM: full     Dental   No notable dental hx     Cardiovascular  Cardiovascular exam normal    Pulmonary  Pulmonary exam normal     Other Findings        Anesthesia Plan  ASA Score- 2     Anesthesia Type- spinal with ASA Monitors  Additional Monitors:   Airway Plan:         Plan Factors-    Induction-     Postoperative Plan-     Informed Consent- Anesthetic plan and risks discussed with patient

## 2019-11-05 NOTE — ANESTHESIA PROCEDURE NOTES
Spinal Block    Start time: 11/5/2019 2:16 PM  Reason for block: primary anesthetic  Staffing  Anesthesiologist: Catarina Alexander DO  Preanesthetic Checklist  Completed: patient identified, site marked, surgical consent, pre-op evaluation, timeout performed, IV checked, risks and benefits discussed and monitors and equipment checked  Spinal Block  Patient position: sitting  Prep: Betadine  Patient monitoring: frequent blood pressure checks and continuous pulse ox  Approach: midline  Location: L3-4  Injection technique: single-shot  Needle  Needle type: pencil-tip   Needle gauge: 24 G  Assessment  Sensory level: T4  Injection Assessment:  negative aspiration for heme, no paresthesia on injection and positive aspiration for clear CSF    Post-procedure:  site cleaned

## 2019-11-05 NOTE — DISCHARGE SUMMARY
Discharge Summary - Libra Valverde 34 y o  female MRN: [id number]    Unit/Bed#: L&D 305-01 Encounter: [id number]    Admission Date: 2019     Discharge Date: 19    Delivering Attending: Dr Mariella Chow  Discharge Attending:    Admitting Diagnosis:   1  Pregnancy at 39w4d  2  Prior  section  3  GBS positive  4  Obesity  5  Familial history of Tripp's disease  6  Rh negative status    Discharge Diagnosis:   Same, delivered      Procedures: Repeat low transverse  section    Complications: none apparent    Hospital Course:     Libra Valverde is a 34 y o  Tilda Score at 39w4d wks who was initially admitted for scheduled repeat section  section  She delivered a viable female  Telly Crane) on 19 at [phone]  Weight 8lbs 6oz via repeat  section, low transverse incision  Apgars were 9 (1 min) and 9 (5 min)   was transferred to  nursery  Patient tolerated the procedure well and was transferred to recovery in stable condition  Patient's post- operative/ partum course was uncomplicated  Pre-operative Hg was 10 6 and 8 7 post-operatively  Condition at discharge: stable     On day of discharge pain was well controlled, patient was tolerating PO with appropriate bowel function  She was discharged on postpartum day #2 with standard post-operative/ partum instructions to follow up with her physician in 3-6 weeks for a postpartum appointment and to call with any signs of infection or bleeding  Discharge instructions: See after visit summary for complete information  Do not place anything (no partner, tampons or douche) in your vagina for 6 weeks    You may walk for exercise for the first 6 weeks then gradually return to your usual activities     Please do not drive for 1 week if you have no stitches and for 2 weeks if you have stitches or underwent a  delivery     You may take baths or shower per your preference     Please look at your breasts in the mirror daily and call for redness or tenderness or increased warmth     If you have had a  please look at your incision daily as well and call us for increasing redness or steady drainage from the incision     Please call us for temperature > 100 4*F or 38* C, worsening pain or a foul discharge  Provisions for Follow-Up Care:  See after visit summary for information related to follow-up care and any pertinent home health orders  Disposition: See After Visit Summary for discharge disposition information  Planned Readmission: No    Discharge Medications:   Prenatal vitamin daily for 6 months or the duration of nursing whichever is longer     Percocet 1 tablet every 4 hours as needed for moderate- severe pain  Motrin 600 mg orally every 6 hours as needed for pain  Tylenol (over the counter) per bottle directions as needed for pain  Hydrocortisone cream 1% (over the counter) applied 1-2x daily to hemorrhoids as needed  Witch hazel pads for hemorrhoidal discomfort as needed

## 2019-11-05 NOTE — PLAN OF CARE
Problem: PAIN - ADULT  Goal: Verbalizes/displays adequate comfort level or baseline comfort level  Description  Interventions:  - Encourage patient to monitor pain and request assistance  - Assess pain using appropriate pain scale  - Administer analgesics based on type and severity of pain and evaluate response  - Implement non-pharmacological measures as appropriate and evaluate response  - Consider cultural and social influences on pain and pain management  - Notify physician/advanced practitioner if interventions unsuccessful or patient reports new pain  Outcome: Progressing     Problem: INFECTION - ADULT  Goal: Absence or prevention of progression during hospitalization  Description  INTERVENTIONS:  - Assess and monitor for signs and symptoms of infection  - Monitor lab/diagnostic results  - Monitor all insertion sites, i e  indwelling lines, tubes, and drains  - Monitor endotracheal if appropriate and nasal secretions for changes in amount and color  - [place] appropriate cooling/warming therapies per order  - Administer medications as ordered  - Instruct and encourage patient and family to use good hand hygiene technique  - Identify and instruct in appropriate isolation precautions for identified infection/condition  Outcome: Progressing  Goal: Absence of fever/infection during neutropenic period  Description  INTERVENTIONS:  - Monitor WBC    Outcome: Progressing     Problem: Knowledge Deficit  Goal: Patient/family/caregiver demonstrates understanding of disease process, treatment plan, medications, and discharge instructions  Description  Complete learning assessment and assess knowledge base    Interventions:  - Provide teaching at level of understanding  - Provide teaching via preferred learning methods  Outcome: Progressing     Problem: DISCHARGE PLANNING  Goal: Discharge to home or other facility with appropriate resources  Description  INTERVENTIONS:  - Identify barriers to discharge w/patient and caregiver  - Arrange for needed discharge resources and transportation as appropriate  - Identify discharge learning needs (meds, wound care, etc )  - Arrange for interpretive services to assist at discharge as needed  - Refer to Case Management Department for coordinating discharge planning if the patient needs post-hospital services based on physician/advanced practitioner order or complex needs related to functional status, cognitive ability, or social support system  Outcome: Progressing     Problem: POSTPARTUM  Goal: Experiences normal postpartum course  Description  INTERVENTIONS:  - Monitor maternal vital signs  - Assess uterine involution and lochia  Outcome: Progressing  Goal: Appropriate maternal -  bonding  Description  INTERVENTIONS:  - Identify family support  - Assess for appropriate maternal/infant bonding   -Encourage maternal/infant bonding opportunities  - Referral to  or  as needed  Outcome: Progressing  Goal: Establishment of infant feeding pattern  Description  INTERVENTIONS:  - Assess breast/bottle feeding  - Refer to lactation as needed  Outcome: Progressing  Goal: Incision(s), wounds(s) or drain site(s) healing without S/S of infection  Description  INTERVENTIONS  - Assess and document risk factors for skin impairment   - Assess and document dressing, incision, wound bed, drain sites and surrounding tissue  - Consider nutrition services referral as needed  - Oral mucous membranes remain intact  - Provide patient/ family education  Outcome: Progressing     Problem: BIRTH - VAGINAL/ SECTION  Goal: Fetal and maternal status remain reassuring during the birth process  Description  INTERVENTIONS:  - Monitor vital signs  - Monitor fetal heart rate  - Monitor uterine activity    - DVT prophylaxis  - Antibiotic prophylaxis   Outcome: Completed  Goal: Emotionally satisfying birthing experience for mother/fetus  Description  Interventions:  - Assess, plan, implement and evaluate the nursing care given to the patient in labor  - Advocate the philosophy that each childbirth experience is a unique experience and support the family's chosen level of involvement and control during the labor process   - Actively participate in both the patient's and family's teaching of the birth process  - Consider cultural, Anglican and age-specific factors and plan care for the patient in labor  Outcome: Completed

## 2019-11-05 NOTE — PLAN OF CARE
Problem: BIRTH - VAGINAL/ SECTION  Goal: Fetal and maternal status remain reassuring during the birth process  Description  INTERVENTIONS:  - Monitor vital signs  - Monitor fetal heart rate  - Monitor uterine activity    - DVT prophylaxis  - Antibiotic prophylaxis   Outcome: Progressing  Goal: Emotionally satisfying birthing experience for mother/fetus  Description  Interventions:  - Assess, plan, implement and evaluate the nursing care given to the patient in labor  - Advocate the philosophy that each childbirth experience is a unique experience and support the family's chosen level of involvement and control during the labor process   - Actively participate in both the patient's and family's teaching of the birth process  - Consider cultural, Voodoo and age-specific factors and plan care for the patient in labor  Outcome: Progressing     Problem: PAIN - ADULT  Goal: Verbalizes/displays adequate comfort level or baseline comfort level  Description  Interventions:  - Encourage patient to monitor pain and request assistance  - Assess pain using appropriate pain scale  - Administer analgesics based on type and severity of pain and evaluate response  - Implement non-pharmacological measures as appropriate and evaluate response  - Consider cultural and social influences on pain and pain management  - Notify physician/advanced practitioner if interventions unsuccessful or patient reports new pain  Outcome: Progressing     Problem: INFECTION - ADULT  Goal: Absence or prevention of progression during hospitalization  Description  INTERVENTIONS:  - Assess and monitor for signs and symptoms of infection  - Monitor lab/diagnostic results  - Monitor all insertion sites, i e  indwelling lines, tubes, and drains  - Monitor endotracheal if appropriate and nasal secretions for changes in amount and color  - [place] appropriate cooling/warming therapies per order  - Administer medications as ordered  - Instruct and encourage patient and family to use good hand hygiene technique  - Identify and instruct in appropriate isolation precautions for identified infection/condition  Outcome: Progressing  Goal: Absence of fever/infection during neutropenic period  Description  INTERVENTIONS:  - Monitor WBC    Outcome: Progressing     Problem: Knowledge Deficit  Goal: Patient/family/caregiver demonstrates understanding of disease process, treatment plan, medications, and discharge instructions  Description  Complete learning assessment and assess knowledge base    Interventions:  - Provide teaching at level of understanding  - Provide teaching via preferred learning methods  Outcome: Progressing     Problem: DISCHARGE PLANNING  Goal: Discharge to home or other facility with appropriate resources  Description  INTERVENTIONS:  - Identify barriers to discharge w/patient and caregiver  - Arrange for needed discharge resources and transportation as appropriate  - Identify discharge learning needs (meds, wound care, etc )  - Arrange for interpretive services to assist at discharge as needed  - Refer to Case Management Department for coordinating discharge planning if the patient needs post-hospital services based on physician/advanced practitioner order or complex needs related to functional status, cognitive ability, or social support system  Outcome: Progressing

## 2019-11-06 PROBLEM — Z3A.39 39 WEEKS GESTATION OF PREGNANCY: Status: RESOLVED | Noted: 2019-08-26 | Resolved: 2019-11-06

## 2019-11-06 LAB
ABO GROUP BLD: NORMAL
BLD GP AB SCN SERPL QL: NEGATIVE
ERYTHROCYTE [DISTWIDTH] IN BLOOD BY AUTOMATED COUNT: 12.7 % (ref 11.6–15.1)
FETAL CELL SCN BLD QL ROSETTE: NEGATIVE
HCT VFR BLD AUTO: 25.7 % (ref 34.8–46.1)
HGB BLD-MCNC: 8.7 G/DL (ref 11.5–15.4)
MCH RBC QN AUTO: 31 PG (ref 26.8–34.3)
MCHC RBC AUTO-ENTMCNC: 33.9 G/DL (ref 31.4–37.4)
MCV RBC AUTO: 92 FL (ref 82–98)
PLATELET # BLD AUTO: 166 THOUSANDS/UL (ref 149–390)
PMV BLD AUTO: 9.1 FL (ref 8.9–12.7)
RBC # BLD AUTO: 2.81 MILLION/UL (ref 3.81–5.12)
RH BLD: NEGATIVE
RPR SER QL: NORMAL
WBC # BLD AUTO: 9.58 THOUSAND/UL (ref 4.31–10.16)

## 2019-11-06 PROCEDURE — 90686 IIV4 VACC NO PRSV 0.5 ML IM: CPT | Performed by: STUDENT IN AN ORGANIZED HEALTH CARE EDUCATION/TRAINING PROGRAM

## 2019-11-06 PROCEDURE — 86901 BLOOD TYPING SEROLOGIC RH(D): CPT | Performed by: STUDENT IN AN ORGANIZED HEALTH CARE EDUCATION/TRAINING PROGRAM

## 2019-11-06 PROCEDURE — 85461 HEMOGLOBIN FETAL: CPT | Performed by: STUDENT IN AN ORGANIZED HEALTH CARE EDUCATION/TRAINING PROGRAM

## 2019-11-06 PROCEDURE — 90471 IMMUNIZATION ADMIN: CPT | Performed by: STUDENT IN AN ORGANIZED HEALTH CARE EDUCATION/TRAINING PROGRAM

## 2019-11-06 PROCEDURE — 99024 POSTOP FOLLOW-UP VISIT: CPT | Performed by: OBSTETRICS & GYNECOLOGY

## 2019-11-06 PROCEDURE — 85027 COMPLETE CBC AUTOMATED: CPT | Performed by: STUDENT IN AN ORGANIZED HEALTH CARE EDUCATION/TRAINING PROGRAM

## 2019-11-06 PROCEDURE — 86850 RBC ANTIBODY SCREEN: CPT | Performed by: STUDENT IN AN ORGANIZED HEALTH CARE EDUCATION/TRAINING PROGRAM

## 2019-11-06 PROCEDURE — 86900 BLOOD TYPING SEROLOGIC ABO: CPT | Performed by: STUDENT IN AN ORGANIZED HEALTH CARE EDUCATION/TRAINING PROGRAM

## 2019-11-06 RX ADMIN — NYSTATIN: 100000 POWDER TOPICAL at 19:45

## 2019-11-06 RX ADMIN — NYSTATIN AND TRIAMCINOLONE ACETONIDE: 100000; 1 OINTMENT TOPICAL at 09:28

## 2019-11-06 RX ADMIN — KETOROLAC TROMETHAMINE 30 MG: 30 INJECTION, SOLUTION INTRAMUSCULAR at 02:44

## 2019-11-06 RX ADMIN — INFLUENZA VIRUS VACCINE 0.5 ML: 15; 15; 15; 15 SUSPENSION INTRAMUSCULAR at 15:26

## 2019-11-06 RX ADMIN — OXYCODONE HYDROCHLORIDE AND ACETAMINOPHEN 1 TABLET: 5; 325 TABLET ORAL at 19:43

## 2019-11-06 RX ADMIN — ENOXAPARIN SODIUM 40 MG: 40 INJECTION SUBCUTANEOUS at 08:51

## 2019-11-06 RX ADMIN — SENNOSIDES 8.6 MG: 8.6 TABLET, FILM COATED ORAL at 08:51

## 2019-11-06 RX ADMIN — DOCUSATE SODIUM 100 MG: 100 CAPSULE, LIQUID FILLED ORAL at 19:44

## 2019-11-06 RX ADMIN — ENOXAPARIN SODIUM 40 MG: 40 INJECTION SUBCUTANEOUS at 22:05

## 2019-11-06 RX ADMIN — OXYCODONE HYDROCHLORIDE AND ACETAMINOPHEN 2 TABLET: 5; 325 TABLET ORAL at 14:08

## 2019-11-06 RX ADMIN — KETOROLAC TROMETHAMINE 30 MG: 30 INJECTION, SOLUTION INTRAMUSCULAR at 08:51

## 2019-11-06 RX ADMIN — IBUPROFEN 600 MG: 600 TABLET, FILM COATED ORAL at 21:55

## 2019-11-06 RX ADMIN — PRENATAL VIT W/ FE FUMARATE-FA TAB 27-0.8 MG 1 TABLET: 27-0.8 TAB at 08:51

## 2019-11-06 RX ADMIN — KETOROLAC TROMETHAMINE 30 MG: 30 INJECTION, SOLUTION INTRAMUSCULAR at 15:07

## 2019-11-06 RX ADMIN — NYSTATIN: 100000 POWDER TOPICAL at 09:28

## 2019-11-06 RX ADMIN — DOCUSATE SODIUM 100 MG: 100 CAPSULE, LIQUID FILLED ORAL at 08:51

## 2019-11-06 RX ADMIN — OXYCODONE HYDROCHLORIDE AND ACETAMINOPHEN 2 TABLET: 5; 325 TABLET ORAL at 07:40

## 2019-11-06 RX ADMIN — NYSTATIN AND TRIAMCINOLONE ACETONIDE: 100000; 1 OINTMENT TOPICAL at 19:44

## 2019-11-06 NOTE — PLAN OF CARE
Problem: POSTPARTUM  Goal: Experiences normal postpartum course  Description  INTERVENTIONS:  - Monitor maternal vital signs  - Assess uterine involution and lochia  Outcome: Progressing  Goal: Appropriate maternal -  bonding  Description  INTERVENTIONS:  - Identify family support  - Assess for appropriate maternal/infant bonding   -Encourage maternal/infant bonding opportunities  - Referral to  or  as needed  Outcome: Progressing  Goal: Establishment of infant feeding pattern  Description  INTERVENTIONS:  - Assess breast/bottle feeding  - Refer to lactation as needed  Outcome: Progressing  Goal: Incision(s), wounds(s) or drain site(s) healing without S/S of infection  Description  INTERVENTIONS  - Assess and document risk factors for skin impairment   - Assess and document dressing, incision, wound bed, drain sites and surrounding tissue  - Consider nutrition services referral as needed  - Oral mucous membranes remain intact  - Provide patient/ family education  Outcome: Progressing     Problem: PAIN - ADULT  Goal: Verbalizes/displays adequate comfort level or baseline comfort level  Description  Interventions:  - Encourage patient to monitor pain and request assistance  - Assess pain using appropriate pain scale  - Administer analgesics based on type and severity of pain and evaluate response  - Implement non-pharmacological measures as appropriate and evaluate response  - Consider cultural and social influences on pain and pain management  - Notify physician/advanced practitioner if interventions unsuccessful or patient reports new pain  Outcome: Progressing     Problem: INFECTION - ADULT  Goal: Absence or prevention of progression during hospitalization  Description  INTERVENTIONS:  - Assess and monitor for signs and symptoms of infection  - Monitor lab/diagnostic results  - Monitor all insertion sites, i e  indwelling lines, tubes, and drains  - Monitor endotracheal if appropriate and nasal secretions for changes in amount and color  - [place] appropriate cooling/warming therapies per order  - Administer medications as ordered  - Instruct and encourage patient and family to use good hand hygiene technique  - Identify and instruct in appropriate isolation precautions for identified infection/condition  Outcome: Progressing  Goal: Absence of fever/infection during neutropenic period  Description  INTERVENTIONS:  - Monitor WBC    Outcome: Progressing     Problem: Knowledge Deficit  Goal: Patient/family/caregiver demonstrates understanding of disease process, treatment plan, medications, and discharge instructions  Description  Complete learning assessment and assess knowledge base    Interventions:  - Provide teaching at level of understanding  - Provide teaching via preferred learning methods  Outcome: Progressing     Problem: DISCHARGE PLANNING  Goal: Discharge to home or other facility with appropriate resources  Description  INTERVENTIONS:  - Identify barriers to discharge w/patient and caregiver  - Arrange for needed discharge resources and transportation as appropriate  - Identify discharge learning needs (meds, wound care, etc )  - Arrange for interpretive services to assist at discharge as needed  - Refer to Case Management Department for coordinating discharge planning if the patient needs post-hospital services based on physician/advanced practitioner order or complex needs related to functional status, cognitive ability, or social support system  Outcome: Progressing

## 2019-11-06 NOTE — LACTATION NOTE
This note was copied from a baby's chart  Mother verbalized breastfeeding is going well  Family at bedside  Enc to call for assistance as needed,phone # given

## 2019-11-06 NOTE — PROGRESS NOTES
Progress Note:      Patient is comfortable, pain is well managed  Patient urine output 30ml/h  She received 1L bolus  She is tolerating p o   Will follow urine output  She decline chest pain shortness of breath and back pain      Vitals:    11/05/19 1810 11/05/19 1910 11/05/19 2030 11/05/19 2216   BP: 120/77 102/71 103/61 110/70   BP Location: Left arm Left arm Right arm Left arm   Pulse: 86 75 86 78   Resp: 18 16 16 16   Temp: 98 2 °F (36 8 °C) 99 3 °F (37 4 °C) 98 7 °F (37 1 °C) 98 6 °F (37 °C)   TempSrc: Oral Oral Oral Oral   SpO2: 96% 98% 92% 97%   Weight:       Height:         Angela Perez MD  OBGYN, PGY-2  11/6/2019  12:11 AM

## 2019-11-06 NOTE — PLAN OF CARE
Problem: PAIN - ADULT  Goal: Verbalizes/displays adequate comfort level or baseline comfort level  Description  Interventions:  - Encourage patient to monitor pain and request assistance  - Assess pain using appropriate pain scale  - Administer analgesics based on type and severity of pain and evaluate response  - Implement non-pharmacological measures as appropriate and evaluate response  - Consider cultural and social influences on pain and pain management  - Notify physician/advanced practitioner if interventions unsuccessful or patient reports new pain  Outcome: Progressing     Problem: INFECTION - ADULT  Goal: Absence or prevention of progression during hospitalization  Description  INTERVENTIONS:  - Assess and monitor for signs and symptoms of infection  - Monitor lab/diagnostic results  - Monitor all insertion sites, i e  indwelling lines, tubes, and drains  - Monitor endotracheal if appropriate and nasal secretions for changes in amount and color  - [place] appropriate cooling/warming therapies per order  - Administer medications as ordered  - Instruct and encourage patient and family to use good hand hygiene technique  - Identify and instruct in appropriate isolation precautions for identified infection/condition  Outcome: Progressing  Goal: Absence of fever/infection during neutropenic period  Description  INTERVENTIONS:  - Monitor WBC    Outcome: Progressing     Problem: Knowledge Deficit  Goal: Patient/family/caregiver demonstrates understanding of disease process, treatment plan, medications, and discharge instructions  Description  Complete learning assessment and assess knowledge base    Interventions:  - Provide teaching at level of understanding  - Provide teaching via preferred learning methods  Outcome: Progressing     Problem: DISCHARGE PLANNING  Goal: Discharge to home or other facility with appropriate resources  Description  INTERVENTIONS:  - Identify barriers to discharge w/patient and caregiver  - Arrange for needed discharge resources and transportation as appropriate  - Identify discharge learning needs (meds, wound care, etc )  - Arrange for interpretive services to assist at discharge as needed  - Refer to Case Management Department for coordinating discharge planning if the patient needs post-hospital services based on physician/advanced practitioner order or complex needs related to functional status, cognitive ability, or social support system  Outcome: Progressing     Problem: POSTPARTUM  Goal: Experiences normal postpartum course  Description  INTERVENTIONS:  - Monitor maternal vital signs  - Assess uterine involution and lochia  Outcome: Progressing  Goal: Appropriate maternal -  bonding  Description  INTERVENTIONS:  - Identify family support  - Assess for appropriate maternal/infant bonding   -Encourage maternal/infant bonding opportunities  - Referral to  or  as needed  Outcome: Progressing  Goal: Establishment of infant feeding pattern  Description  INTERVENTIONS:  - Assess breast/bottle feeding  - Refer to lactation as needed  Outcome: Progressing  Goal: Incision(s), wounds(s) or drain site(s) healing without S/S of infection  Description  INTERVENTIONS  - Assess and document risk factors for skin impairment   - Assess and document dressing, incision, wound bed, drain sites and surrounding tissue  - Consider nutrition services referral as needed  - Oral mucous membranes remain intact  - Provide patient/ family education  Outcome: Progressing

## 2019-11-06 NOTE — PROGRESS NOTES
Progress Note - OB/GYN  Nael Pimentel 34 y o  female MRN: [id number]  Unit/Bed#: L&D 305-01 Encounter: [id number]      Assessment:  Postop Day #1 s/p RLTCS, stable, baby in the room    Plan:    1) Postoperative care   Hgb 10 8g/dL --> 8 7   Urinary output adequate, shultz removed--> f/u void   Encourage ambulation   Encourage breastfeeding    2) Rh negative:   RHIG/Rhog ordered    3) DVT ppx:   -Lovenox 40 mg BID starting this AM    4) Anticipate discharge PPD#3    Chief Complaint:    Post delivery  Patient is doing well  Lochia WNL  Pain well controlled  Subjective    Pain: well-controlled  Tolerating PO: yes  Voiding: yes, Shultz out this AM  Flatus: yes  BM: no  Ambulating: es  Breastfeeding:  yes  Chest pain: no  Shortness of breath: no  Leg pain: no  Lochia: WNL    Vitals:   /59 (BP Location: Left arm)   Pulse 85   Temp 98 9 °F (37 2 °C) (Oral)   Resp 20   Ht 5' 6" (1 676 m)   Wt 115 kg (254 lb)   LMP  (LMP Unknown)   SpO2 95%   Breastfeeding?  Yes   BMI 41 00 kg/m²       Intake/Output Summary (Last 24 hours) at 11/6/2019 0624  Last data filed at 11/6/2019 0440  Gross per 24 hour   Intake 5640 ml   Output 1427 ml   Net 4213 ml       Invasive Devices     Peripheral Intravenous Line            Peripheral IV 11/05/19 Right;Ventral (anterior) Arm 1 day                Physical Exam:   GEN: Nael Pimentel appears well, alert and oriented x 3, pleasant and cooperative   ABDOMEN: soft, no tenderness, no distention, fundus firms and at umbilucus, Incision C/D/I  EXTREMITIES: SCDs on      Labs:   Admission on 11/05/2019   Component Date Value    ABO Grouping 11/05/2019 O     Rh Factor 11/05/2019 Negative     Antibody Screen 11/05/2019 Negative     Specimen Expiration Date 11/05/2019 [id number]     WBC 11/05/2019 7 98     RBC 11/05/2019 3 47*    Hemoglobin 11/05/2019 10 8*    Hematocrit 11/05/2019 31 6*    MCV 11/05/2019 91     MCH 11/05/2019 31 1     MCHC 11/05/2019 34 2     RDW 11/05/2019 12 8     Platelets 84/02/4854 239     MPV 11/05/2019 8 9     Color, UA 11/05/2019 Yellow     Clarity, UA 11/05/2019 Cloudy     Specific Gravity, UA 11/05/2019 1 010     pH, UA 11/05/2019 8 0     Leukocytes, UA 11/05/2019 Small*    Nitrite, UA 11/05/2019 Negative     Protein, UA 11/05/2019 Negative     Glucose, UA 11/05/2019 Negative     Ketones, UA 11/05/2019 40 (2+)*    Urobilinogen, UA 11/05/2019 0 2     Bilirubin, UA 11/05/2019 Negative     Blood, UA 11/05/2019 Trace-Intact*    Sodium 11/05/2019 135*    Potassium 11/05/2019 3 6     Chloride 11/05/2019 102     CO2 11/05/2019 21     ANION GAP 11/05/2019 12     BUN 11/05/2019 8     Creatinine 11/05/2019 0 62     Glucose 11/05/2019 83     Calcium 11/05/2019 9 8     AST 11/05/2019 31     ALT 11/05/2019 13     Alkaline Phosphatase 11/05/2019 111     Total Protein 11/05/2019 7 0     Albumin 11/05/2019 2 7*    Total Bilirubin 11/05/2019 0 27     eGFR 11/05/2019 122     RBC, UA 11/05/2019 0-1*    WBC, UA 11/05/2019 4-10*    Epithelial Cells 11/05/2019 Occasional     Bacteria, UA 11/05/2019 Innumerable*    pH, Cord Art 11/05/2019 7 361     pCO2, Cord Art 11/05/2019 49 6     pO2, Cord Art 11/05/2019 15 9     HCO3, Cord Art 11/05/2019 27 5*    Base Exc, Cord Art 11/05/2019 1 1*    O2 Content, Cord Art 11/05/2019 7 3     O2 Hgb, Arterial Cord 11/05/2019 30 6     pH, Cord Tyson 11/05/2019 7 405     pCO2, Cord Tyson 11/05/2019 38 4     pO2, Cord Tyson 11/05/2019 21 9     HCO3, Cord Tyson 11/05/2019 23 5     Base Exc, Cord Tyson 11/05/2019 -0 9*    O2 Cont, Cord Tyson 11/05/2019 12 2     O2 HGB,VENOUS CORD 11/05/2019 54 5     ABO Grouping 11/06/2019 O     Rh Factor 11/06/2019 Negative     Antibody Screen 11/06/2019 Negative     Strep Group B Ag 09/24/2019 Positive*    WBC 11/06/2019 9 58     RBC 11/06/2019 2 81*    Hemoglobin 11/06/2019 8 7*    Hematocrit 11/06/2019 25 7*    MCV 11/06/2019 92     MCH 11/06/2019 31 0     MCHC 2019 33 9     RDW 2019 12 7     Platelets  166     MPV 2019 9 1          Patient Active Problem List   Diagnosis    Esophageal reflux    Fatigue    Iron deficiency anemia    Obesity, unspecified    Postpartum depression    Neck muscle spasm    Chronic neck pain    Syncope    Maternal morbid obesity, antepartum ([address] )    Family history of Greg's disease    39 weeks gestation of pregnancy    Urinary urgency    Status post repeat low transverse  section          Rachel Hernández MD  2019  6:24 AM

## 2019-11-07 VITALS
RESPIRATION RATE: 18 BRPM | DIASTOLIC BLOOD PRESSURE: 78 MMHG | HEIGHT: 66 IN | OXYGEN SATURATION: 98 % | WEIGHT: 254 LBS | SYSTOLIC BLOOD PRESSURE: 118 MMHG | HEART RATE: 96 BPM | TEMPERATURE: 98.4 F | BODY MASS INDEX: 40.82 KG/M2

## 2019-11-07 PROCEDURE — 90715 TDAP VACCINE 7 YRS/> IM: CPT | Performed by: OBSTETRICS & GYNECOLOGY

## 2019-11-07 PROCEDURE — 99024 POSTOP FOLLOW-UP VISIT: CPT | Performed by: OBSTETRICS & GYNECOLOGY

## 2019-11-07 RX ORDER — OXYCODONE HYDROCHLORIDE AND ACETAMINOPHEN 5; 325 MG/1; MG/1
1 TABLET ORAL EVERY 4 HOURS PRN
Qty: 10 TABLET | Refills: 0 | Status: SHIPPED | OUTPATIENT
Start: 2019-11-07 | End: 2019-11-17

## 2019-11-07 RX ORDER — ACETAMINOPHEN 325 MG/1
650 TABLET ORAL EVERY 4 HOURS PRN
Qty: 30 TABLET | Refills: 0 | Status: SHIPPED | OUTPATIENT
Start: 2019-11-07 | End: 2019-11-26

## 2019-11-07 RX ORDER — IBUPROFEN 600 MG/1
600 TABLET ORAL EVERY 6 HOURS PRN
Qty: 30 TABLET | Refills: 0 | Status: SHIPPED | OUTPATIENT
Start: 2019-11-07

## 2019-11-07 RX ADMIN — PRENATAL VIT W/ FE FUMARATE-FA TAB 27-0.8 MG 1 TABLET: 27-0.8 TAB at 08:36

## 2019-11-07 RX ADMIN — ENOXAPARIN SODIUM 40 MG: 40 INJECTION SUBCUTANEOUS at 08:27

## 2019-11-07 RX ADMIN — OXYCODONE HYDROCHLORIDE AND ACETAMINOPHEN 1 TABLET: 5; 325 TABLET ORAL at 07:46

## 2019-11-07 RX ADMIN — OXYCODONE HYDROCHLORIDE AND ACETAMINOPHEN 1 TABLET: 5; 325 TABLET ORAL at 01:59

## 2019-11-07 RX ADMIN — NYSTATIN: 100000 POWDER TOPICAL at 08:35

## 2019-11-07 RX ADMIN — TETANUS TOXOID, REDUCED DIPHTHERIA TOXOID AND ACELLULAR PERTUSSIS VACCINE, ADSORBED 0.5 ML: 5; 2.5; 8; 8; 2.5 SUSPENSION INTRAMUSCULAR at 11:39

## 2019-11-07 RX ADMIN — IBUPROFEN 600 MG: 600 TABLET, FILM COATED ORAL at 03:50

## 2019-11-07 RX ADMIN — DOCUSATE SODIUM 100 MG: 100 CAPSULE, LIQUID FILLED ORAL at 08:36

## 2019-11-07 RX ADMIN — SENNOSIDES 8.6 MG: 8.6 TABLET, FILM COATED ORAL at 08:36

## 2019-11-07 RX ADMIN — IBUPROFEN 600 MG: 600 TABLET, FILM COATED ORAL at 11:15

## 2019-11-07 RX ADMIN — NYSTATIN AND TRIAMCINOLONE ACETONIDE: 100000; 1 OINTMENT TOPICAL at 08:35

## 2019-11-07 RX ADMIN — SIMETHICONE CHEW TAB 80 MG 80 MG: 80 TABLET ORAL at 07:46

## 2019-11-07 RX ADMIN — HUMAN RHO(D) IMMUNE GLOBULIN 300 MCG: 300 INJECTION, SOLUTION INTRAMUSCULAR at 03:47

## 2019-11-07 NOTE — PLAN OF CARE
Problem: PAIN - ADULT  Goal: Verbalizes/displays adequate comfort level or baseline comfort level  Description  Interventions:  - Encourage patient to monitor pain and request assistance  - Assess pain using appropriate pain scale  - Administer analgesics based on type and severity of pain and evaluate response  - Implement non-pharmacological measures as appropriate and evaluate response  - Consider cultural and social influences on pain and pain management  - Notify physician/advanced practitioner if interventions unsuccessful or patient reports new pain  Outcome: Progressing     Problem: INFECTION - ADULT  Goal: Absence or prevention of progression during hospitalization  Description  INTERVENTIONS:  - Assess and monitor for signs and symptoms of infection  - Monitor lab/diagnostic results  - Monitor all insertion sites, i e  indwelling lines, tubes, and drains  - Monitor endotracheal if appropriate and nasal secretions for changes in amount and color  - [place] appropriate cooling/warming therapies per order  - Administer medications as ordered  - Instruct and encourage patient and family to use good hand hygiene technique  - Identify and instruct in appropriate isolation precautions for identified infection/condition  Outcome: Progressing  Goal: Absence of fever/infection during neutropenic period  Description  INTERVENTIONS:  - Monitor WBC    Outcome: Progressing     Problem: Knowledge Deficit  Goal: Patient/family/caregiver demonstrates understanding of disease process, treatment plan, medications, and discharge instructions  Description  Complete learning assessment and assess knowledge base    Interventions:  - Provide teaching at level of understanding  - Provide teaching via preferred learning methods  Outcome: Progressing     Problem: DISCHARGE PLANNING  Goal: Discharge to home or other facility with appropriate resources  Description  INTERVENTIONS:  - Identify barriers to discharge w/patient and caregiver  - Arrange for needed discharge resources and transportation as appropriate  - Identify discharge learning needs (meds, wound care, etc )  - Arrange for interpretive services to assist at discharge as needed  - Refer to Case Management Department for coordinating discharge planning if the patient needs post-hospital services based on physician/advanced practitioner order or complex needs related to functional status, cognitive ability, or social support system  Outcome: Progressing     Problem: POSTPARTUM  Goal: Experiences normal postpartum course  Description  INTERVENTIONS:  - Monitor maternal vital signs  - Assess uterine involution and lochia  Outcome: Progressing  Goal: Appropriate maternal -  bonding  Description  INTERVENTIONS:  - Identify family support  - Assess for appropriate maternal/infant bonding   -Encourage maternal/infant bonding opportunities  - Referral to  or  as needed  Outcome: Progressing  Goal: Establishment of infant feeding pattern  Description  INTERVENTIONS:  - Assess breast/bottle feeding  - Refer to lactation as needed  Outcome: Progressing  Goal: Incision(s), wounds(s) or drain site(s) healing without S/S of infection  Description  INTERVENTIONS  - Assess and document risk factors for skin impairment   - Assess and document dressing, incision, wound bed, drain sites and surrounding tissue  - Consider nutrition services referral as needed  - Oral mucous membranes remain intact  - Provide patient/ family education  Outcome: Progressing

## 2019-11-07 NOTE — PROGRESS NOTES
Progress Note - OB/GYN   Steff Goodrich 34 y o  female MRN: [id number]  Unit/Bed#: L&D 305-01 Encounter: [id number]    Assessment:  Post partum Day #2 s/p RLTCS, stable, baby in roomm    Plan:  1) Rh neg: Rhogam given  2) Post op care:   Hgb 8 7   Voiding spontaneously   Lovenox 40 q 12 while inpatient  3) Continue routine post partum care   Encourage ambulation   Encourage breastfeeding   Anticipate discharge today     Subjective/Objective   Chief Complaint:     Post delivery  Patient is doing well  Lochia WNL  Pain well controlled  Desires discharge today    Subjective:     Pain: yes, cramping, improved with meds  Tolerating PO: yes  Voiding: yes  Flatus: yes  BM: no  Ambulating: yes  Chest pain: no  Shortness of breath: no  Leg pain: no  Lochia: minimal    Objective:     Vitals: BP 99/65 (BP Location: Right arm)   Pulse 90   Temp 98 4 °F (36 9 °C) (Oral)   Resp 16   Ht 5' 6" (1 676 m)   Wt 115 kg (254 lb)   LMP  (LMP Unknown)   SpO2 94%   Breastfeeding? Yes   BMI 41 00 kg/m²       Intake/Output Summary (Last 24 hours) at 11/7/2019 0645  Last data filed at 11/7/2019 0550  Gross per 24 hour   Intake 2780 ml   Output 1750 ml   Net 1030 ml       Lab Results   Component Value Date    WBC 9 58 11/06/2019    HGB 8 7 (L) 11/06/2019    HCT 25 7 (L) 11/06/2019    MCV 92 11/06/2019     11/06/2019       Physical Exam:     Gen: AAOx3, NAD  CV: RRR  Lungs: CTA b/l  Abd: Soft, non-tender, non-distended, no rebound or guarding  Uterine fundus firm and non-tender, 1 cm below the umbilicus   Incision clean/dry/intact  Ext: Non tender    Serg Luna MD  11/7/2019  6:45 AM

## 2019-11-07 NOTE — LACTATION NOTE
This note was copied from a baby's chart  Met with mother to go over discharge breastfeeding booklet including the feeding log since birth for the first week  Emphasized 8 or more (12) feedings in a 24 hour period, what to expect for the number of diapers per day of life and the progression of properties of the  stooling pattern  Discussed s/s that breastfeeding is going well after day 4 and when to get help from a pediatrician or lactation support person after day 4  Booklet included Breast Pumping Instructions, When You Go Back to Work or School, and Breastfeeding Resources for after discharge including access to the number for the [address]

## 2019-11-13 LAB — PLACENTA IN STORAGE: NORMAL

## 2019-11-26 ENCOUNTER — APPOINTMENT (EMERGENCY)
Dept: ULTRASOUND IMAGING | Facility: HOSPITAL | Age: 29
End: 2019-11-26
Payer: COMMERCIAL

## 2019-11-26 ENCOUNTER — HOSPITAL ENCOUNTER (EMERGENCY)
Facility: HOSPITAL | Age: 29
Discharge: HOME | End: 2019-11-26
Attending: EMERGENCY MEDICINE | Admitting: EMERGENCY MEDICINE
Payer: COMMERCIAL

## 2019-11-26 VITALS
HEART RATE: 71 BPM | RESPIRATION RATE: 18 BRPM | WEIGHT: 239 LBS | OXYGEN SATURATION: 100 % | SYSTOLIC BLOOD PRESSURE: 132 MMHG | TEMPERATURE: 98.1 F | HEIGHT: 66 IN | BODY MASS INDEX: 38.41 KG/M2 | DIASTOLIC BLOOD PRESSURE: 90 MMHG

## 2019-11-26 DIAGNOSIS — R10.9 ABDOMINAL PAIN: Primary | ICD-10-CM

## 2019-11-26 DIAGNOSIS — K80.20 CHOLELITHIASIS: ICD-10-CM

## 2019-11-26 LAB
ALBUMIN SERPL BCP-MCNC: 4.6 G/DL (ref 3.5–5.7)
ALP SERPL-CCNC: 64 U/L (ref 40–150)
ALT SERPL W P-5'-P-CCNC: 13 U/L (ref 7–52)
ANION GAP SERPL CALCULATED.3IONS-SCNC: 9 MMOL/L (ref 4–13)
AST SERPL W P-5'-P-CCNC: 17 U/L (ref 13–39)
B-HCG SERPL-ACNC: 5.11 MIU/ML (ref 0–11.6)
BACTERIA UR QL AUTO: ABNORMAL /HPF
BASOPHILS # BLD AUTO: 0.1 THOUSANDS/ΜL (ref 0–0.1)
BASOPHILS NFR BLD AUTO: 1 % (ref 0–2)
BILIRUB SERPL-MCNC: 0.3 MG/DL (ref 0.2–1)
BILIRUB UR QL STRIP: NEGATIVE
BUN SERPL-MCNC: 21 MG/DL (ref 7–25)
CALCIUM SERPL-MCNC: 9.8 MG/DL (ref 8.6–10.5)
CHLORIDE SERPL-SCNC: 102 MMOL/L (ref 98–107)
CLARITY UR: CLEAR
CO2 SERPL-SCNC: 30 MMOL/L (ref 21–31)
COLOR UR: YELLOW
CREAT SERPL-MCNC: 0.84 MG/DL (ref 0.6–1.2)
EOSINOPHIL # BLD AUTO: 0.1 THOUSAND/ΜL (ref 0–0.61)
EOSINOPHIL NFR BLD AUTO: 2 % (ref 0–5)
ERYTHROCYTE [DISTWIDTH] IN BLOOD BY AUTOMATED COUNT: 13.3 % (ref 11.5–14.5)
EXT PREG TEST URINE: POSITIVE
EXT. CONTROL ED NAV: ABNORMAL
GFR SERPL CREATININE-BSD FRML MDRD: 94 ML/MIN/1.73SQ M
GLUCOSE SERPL-MCNC: 87 MG/DL (ref 65–99)
GLUCOSE UR STRIP-MCNC: NEGATIVE MG/DL
HCT VFR BLD AUTO: 38.5 % (ref 42–47)
HGB BLD-MCNC: 13.2 G/DL (ref 12–16)
HGB UR QL STRIP.AUTO: ABNORMAL
KETONES UR STRIP-MCNC: NEGATIVE MG/DL
LEUKOCYTE ESTERASE UR QL STRIP: ABNORMAL
LIPASE SERPL-CCNC: 145 U/L (ref 11–82)
LYMPHOCYTES # BLD AUTO: 1.2 THOUSANDS/ΜL (ref 0.6–4.47)
LYMPHOCYTES NFR BLD AUTO: 19 % (ref 21–51)
MCH RBC QN AUTO: 30 PG (ref 26–34)
MCHC RBC AUTO-ENTMCNC: 34.4 G/DL (ref 31–37)
MCV RBC AUTO: 87 FL (ref 81–99)
MONOCYTES # BLD AUTO: 0.4 THOUSAND/ΜL (ref 0.17–1.22)
MONOCYTES NFR BLD AUTO: 7 % (ref 2–12)
MUCOUS THREADS UR QL AUTO: ABNORMAL
NEUTROPHILS # BLD AUTO: 4.5 THOUSANDS/ΜL (ref 1.4–6.5)
NEUTS SEG NFR BLD AUTO: 72 % (ref 42–75)
NITRITE UR QL STRIP: NEGATIVE
NON-SQ EPI CELLS URNS QL MICRO: ABNORMAL /HPF
PH UR STRIP.AUTO: 6.5 [PH]
PLATELET # BLD AUTO: 322 THOUSANDS/UL (ref 149–390)
PMV BLD AUTO: 6.7 FL (ref 8.6–11.7)
POTASSIUM SERPL-SCNC: 4 MMOL/L (ref 3.5–5.5)
PROT SERPL-MCNC: 8 G/DL (ref 6.4–8.9)
PROT UR STRIP-MCNC: ABNORMAL MG/DL
RBC # BLD AUTO: 4.41 MILLION/UL (ref 3.9–5.2)
RBC #/AREA URNS AUTO: ABNORMAL /HPF
SODIUM SERPL-SCNC: 141 MMOL/L (ref 134–143)
SP GR UR STRIP.AUTO: 1.01 (ref 1–1.03)
UROBILINOGEN UR QL STRIP.AUTO: 0.2 E.U./DL
WBC # BLD AUTO: 6.2 THOUSAND/UL (ref 4.8–10.8)
WBC #/AREA URNS AUTO: ABNORMAL /HPF

## 2019-11-26 PROCEDURE — 99284 EMERGENCY DEPT VISIT MOD MDM: CPT

## 2019-11-26 PROCEDURE — 87086 URINE CULTURE/COLONY COUNT: CPT | Performed by: EMERGENCY MEDICINE

## 2019-11-26 PROCEDURE — 87147 CULTURE TYPE IMMUNOLOGIC: CPT | Performed by: EMERGENCY MEDICINE

## 2019-11-26 PROCEDURE — 76705 ECHO EXAM OF ABDOMEN: CPT

## 2019-11-26 PROCEDURE — 99283 EMERGENCY DEPT VISIT LOW MDM: CPT | Performed by: EMERGENCY MEDICINE

## 2019-11-26 PROCEDURE — 85025 COMPLETE CBC W/AUTO DIFF WBC: CPT | Performed by: EMERGENCY MEDICINE

## 2019-11-26 PROCEDURE — 80053 COMPREHEN METABOLIC PANEL: CPT | Performed by: EMERGENCY MEDICINE

## 2019-11-26 PROCEDURE — 84702 CHORIONIC GONADOTROPIN TEST: CPT | Performed by: EMERGENCY MEDICINE

## 2019-11-26 PROCEDURE — 83690 ASSAY OF LIPASE: CPT | Performed by: EMERGENCY MEDICINE

## 2019-11-26 PROCEDURE — 81001 URINALYSIS AUTO W/SCOPE: CPT | Performed by: EMERGENCY MEDICINE

## 2019-11-26 PROCEDURE — 96360 HYDRATION IV INFUSION INIT: CPT

## 2019-11-26 PROCEDURE — 36415 COLL VENOUS BLD VENIPUNCTURE: CPT | Performed by: EMERGENCY MEDICINE

## 2019-11-26 PROCEDURE — 81025 URINE PREGNANCY TEST: CPT | Performed by: EMERGENCY MEDICINE

## 2019-11-26 RX ADMIN — SODIUM CHLORIDE 1000 ML: 0.9 INJECTION, SOLUTION INTRAVENOUS at 14:34

## 2019-11-26 NOTE — ED NOTES
Pt returned from ultrasound  Resting comfortably on ED litter, talking on cell phone        Courtney Hanley RN  11/26/19 9982

## 2019-11-26 NOTE — ED PROVIDER NOTES
Pt Name: Onelia Murdock  MRN: [id number]  [place] 1990  Age/Sex: 34 y o  female  Date of evaluation: 2019  PCP: Jennifer Delgado Dr    Chief Complaint   Patient presents with    Abdominal Pain     sharp pain in the epigastric and the mid back lasting about 30 minutes, had shortness of breath during episode, took two tums and 600 mg of motrin around 1245         HPI    Tash Nina presents to the Emergency Department complaining of sudden severe abdominal pain that has since resolved  Today is the second time that this happened and lasted about 30 minutes  She had severe pain in her upper abdomen and through to her back  Now the pain is completely gone  She is 3 weeks post partum and doing well otherwise  No fever  No vomiting or diarrhea  HPI      Past Medical and Surgical History    Past Medical History:   Diagnosis Date    Anemia     Post partum depression     Rash of genital area     started a few weeks ago, red       Past Surgical History:   Procedure Laterality Date    APPENDECTOMY       SECTION      [place]  DELIVERY ONLY N/A 2019    Procedure:  SECTION () REPEAT;  Surgeon: Rianna Guo MD;  Location: [facility];  Service: Obstetrics       Family History   Problem Relation Age of Onset    Greg's disease Family        Social History     Tobacco Use    Smoking status: Former Smoker     Last attempt to quit: 2017     Years since quittin 3    Smokeless tobacco: Never Used   Substance Use Topics    Alcohol use: No    Drug use: Not Currently           Allergies    No Known Allergies    Home Medications    Prior to Admission medications    Medication Sig Start Date End Date Taking?  Authorizing Provider   ibuprofen (MOTRIN) 600 mg tablet Take 1 tablet (600 mg total) by mouth every 6 (six) hours as needed (cramping) 19  Yes Sanam Berg MD   acetaminophen (TYLENOL) 325 mg tablet Take 2 tablets (650 mg total) by mouth every 4 (four) hours as needed for mild pain 11/7/19 11/26/19  Tian Olvera MD   ferrous sulfate 325 (65 Fe) mg tablet Take 28 mg by mouth daily with breakfast  11/26/19  Historical Provider, MD   Prenatal Multivit-Min-Fe-FA (PRENATAL VITAMINS) 0 8 MG tablet Take 1 tablet by mouth daily  11/26/19  Historical Provider, MD           Review of Systems    Review of Systems   Constitutional: Negative for activity change, appetite change, chills, diaphoresis, fatigue and fever  HENT: Negative for congestion, postnasal drip, rhinorrhea, sinus pressure, sneezing and sore throat  Eyes: Negative for pain and visual disturbance  Respiratory: Negative for cough, chest tightness and shortness of breath  Cardiovascular: Negative for chest pain, palpitations and leg swelling  Gastrointestinal: Positive for abdominal pain  Negative for abdominal distention, constipation, diarrhea, nausea and vomiting  Endocrine: Negative for polydipsia, polyphagia and polyuria  Genitourinary: Negative for decreased urine volume, difficulty urinating, dysuria, flank pain, frequency and hematuria  Musculoskeletal: Negative for arthralgias, gait problem, joint swelling and neck pain  Skin: Negative for pallor and rash  Allergic/Immunologic: Negative for immunocompromised state  Neurological: Negative for syncope, speech difficulty, weakness, light-headedness, numbness and headaches  All other systems reviewed and are negative  Physical Exam      ED Triage Vitals [11/26/19 1406]   Temperature Pulse Respirations Blood Pressure SpO2   98 1 °F (36 7 °C) 87 16 148/90 98 %      Temp Source Heart Rate Source Patient Position - Orthostatic VS BP Location FiO2 (%)   Temporal Monitor Sitting Left arm --      Pain Score       No Pain               Physical Exam   Constitutional: She is oriented to person, place, and time  She appears well-developed and well-nourished  No distress     HENT: Head: Normocephalic and atraumatic  Nose: Nose normal    Mouth/Throat: Oropharynx is clear and moist    Eyes: Pupils are equal, round, and reactive to light  Conjunctivae, EOM and lids are normal    Neck: Normal range of motion  Neck supple  Cardiovascular: Normal rate, regular rhythm and normal heart sounds  Exam reveals no gallop and no friction rub  No murmur heard  Pulmonary/Chest: Effort normal and breath sounds normal  No accessory muscle usage  No respiratory distress  She has no wheezes  She has no rales  Abdominal: Soft  She exhibits no distension  There is no tenderness  There is no rebound and no guarding  Neurological: She is alert and oriented to person, place, and time  No cranial nerve deficit or sensory deficit  Skin: Skin is warm and dry  No rash noted  She is not diaphoretic  No erythema  Psychiatric: She has a normal mood and affect  Her speech is normal and behavior is normal  Judgment and thought content normal    Nursing note and vitals reviewed  Assessment and Plan    Elizabeth More is a 34 y o  female who presents with abdominal pain  Physical examination unremarkable  Differential diagnosis (not completely inclusive) includes gallbladder pathology, GERD, gastritis, ulceration  Esophageal spasm  Plan will be to perform diagnostic testing and treat symptomatically        MDM    Diagnostic Results      Labs:    Results for orders placed or performed during the hospital encounter of 11/26/19   CBC and differential   Result Value Ref Range    WBC 6 20 4 80 - 10 80 Thousand/uL    RBC 4 41 3 90 - 5 20 Million/uL    Hemoglobin 13 2 12 0 - 16 0 g/dL    Hematocrit 38 5 (L) 42 0 - 47 0 %    MCV 87 81 - 99 fL    MCH 30 0 26 0 - 34 0 pg    MCHC 34 4 31 0 - 37 0 g/dL    RDW 13 3 11 5 - 14 5 %    MPV 6 7 (L) 8 6 - 11 7 fL    Platelets 699 511 - 113 Thousands/uL    Neutrophils Relative 72 42 - 75 %    Lymphocytes Relative 19 (L) 21 - 51 %    Monocytes Relative 7 2 - 12 %    Eosinophils Relative 2 0 - 5 %    Basophils Relative 1 0 - 2 %    Neutrophils Absolute 4 50 1 40 - 6 50 Thousands/µL    Lymphocytes Absolute 1 20 0 60 - 4 47 Thousands/µL    Monocytes Absolute 0 40 0 17 - 1 22 Thousand/µL    Eosinophils Absolute 0 10 0 00 - 0 61 Thousand/µL    Basophils Absolute 0 10 0 00 - 0 10 Thousands/µL   Comprehensive metabolic panel   Result Value Ref Range    Sodium 141 134 - 143 mmol/L    Potassium 4 0 3 5 - 5 5 mmol/L    Chloride 102 98 - 107 mmol/L    CO2 30 21 - 31 mmol/L    ANION GAP 9 4 - 13 mmol/L    BUN 21 7 - 25 mg/dL    Creatinine 0 84 0 60 - 1 20 mg/dL    Glucose 87 65 - 99 mg/dL    Calcium 9 8 8 6 - 10 5 mg/dL    AST 17 13 - 39 U/L    ALT 13 7 - 52 U/L    Alkaline Phosphatase 64 40 - 150 U/L    Total Protein 8 0 6 4 - 8 9 g/dL    Albumin 4 6 3 5 - 5 7 g/dL    Total Bilirubin 0 30 0 20 - 1 00 mg/dL    eGFR 94 ml/min/1 73sq m   Lipase   Result Value Ref Range    Lipase 145 (H) 11 - 82 u/L   UA w Reflex to Microscopic w Reflex to Culture   Result Value Ref Range    Color, UA Yellow Yellow, Straw    Clarity, UA Clear Hazy, Clear    Specific Gravity, UA 1 015 >1 005-<1 030    pH, UA 6 5 5 0, 5 5, 6 0, 6 5, 7 0, 7 5    Leukocytes, UA 2+ (A) Negative    Nitrite, UA Negative Negative    Protein, UA Trace (A) Negative, Interference- unable to analyze mg/dl    Glucose, UA Negative Negative mg/dl    Ketones, UA Negative Negative mg/dl    Urobilinogen, UA 0 2 0 2, 1 0 E U /dl E U /dl    Bilirubin, UA Negative Negative    Blood, UA Trace-Intact (A) Negative   Urine Microscopic   Result Value Ref Range    RBC, UA 0-1 (A) None Seen, 0-5 /hpf    WBC, UA 20-30 (A) None Seen, 0-5, 5-55, 5-65 /hpf    Epithelial Cells Occasional None Seen, Occasional /hpf    Bacteria, UA Occasional None Seen, Occasional /hpf    MUCUS THREADS Moderate (A) None Seen   hCG, quantitative   Result Value Ref Range    HCG, Quant 5 11 0 - 11 6 mIU/mL   POCT pregnancy, urine   Result Value Ref Range    EXT PREG TEST UR (Ref: Negative) positive     Control valid        All labs reviewed and utilized in the medical decision making process    Radiology:    US gallbladder   Final Result      Cholelithiasis without definite sonographic evidence for acute cholecystitis  Hepatomegaly  Workstation performed: [id number]             All radiology studies independently viewed by me and interpreted by the radiologist     Procedure    Procedures      ED Course of Care and Re-Assessments      Medications   sodium chloride 0 9 % bolus 1,000 mL (0 mL Intravenous Stopped 11/26/19 7589)           FINAL IMPRESSION    Final diagnoses:   Abdominal pain   Cholelithiasis         DISPOSITION/PLAN    Time reflects when diagnosis was documented in both MDM as applicable and the Disposition within this note     Time User Action Codes Description Comment    11/26/2019  3:37 PM Lukasz Harsh NELI Add [R10 9] Abdominal pain     11/26/2019  3:37 PM Lukasz House Add [K80 20] Cholelithiasis       ED Disposition     ED Disposition Condition Date/Time Comment    Discharge Stable Tue Nov 26, 2019  3:37 PM Silvia Hawkins discharge to home/self care              Follow-up Information     Follow up With Specialties Details Why Contact Info    Price Libman, PA-C Physician Assistant Schedule an appointment as soon as possible for a visit   [address] [place] [address] [id number]      Myla Connelly DO General Surgery, Wound Care Schedule an appointment as soon as possible for a visit   [address] [address]  [phone]              PATIENT REFERRED TO:    Price Libman, PA-C  [facility] [address] [place] [address]  [phone]    Schedule an appointment as soon as possible for a visit       Myla Connelly DO  [address] [address]  [phone]    Schedule an appointment as soon as possible for a visit         DISCHARGE MEDICATIONS:    Discharge Medication List as of 11/26/2019  3:45 PM      CONTINUE these medications which have NOT CHANGED    Details   ibuprofen (MOTRIN) 600 mg tablet Take 1 tablet (600 mg total) by mouth every 6 (six) hours as needed (cramping), Starting Thu 11/7/2019, Normal             No discharge procedures on file           Zachary Ojeda, [address] [place] [address],   11/26/19 9286

## 2019-11-29 LAB
BACTERIA UR CULT: ABNORMAL
BACTERIA UR CULT: ABNORMAL

## 2020-07-22 ENCOUNTER — TELEMEDICINE (OUTPATIENT)
Dept: FAMILY MEDICINE CLINIC | Facility: CLINIC | Age: 30
End: 2020-07-22

## 2020-07-22 DIAGNOSIS — R11.0 NAUSEA: ICD-10-CM

## 2020-07-22 DIAGNOSIS — K80.20 CALCULUS OF GALLBLADDER WITHOUT CHOLECYSTITIS WITHOUT OBSTRUCTION: ICD-10-CM

## 2020-07-22 DIAGNOSIS — K21.9 GASTROESOPHAGEAL REFLUX DISEASE, ESOPHAGITIS PRESENCE NOT SPECIFIED: Primary | ICD-10-CM

## 2020-07-22 PROCEDURE — 99213 OFFICE O/P EST LOW 20 MIN: CPT | Performed by: FAMILY MEDICINE

## 2020-07-22 RX ORDER — OMEPRAZOLE 20 MG/1
20 CAPSULE, DELAYED RELEASE ORAL DAILY
Qty: 30 CAPSULE | Refills: 2 | Status: SHIPPED | OUTPATIENT
Start: 2020-07-22 | End: 2021-05-10 | Stop reason: HOSPADM

## 2020-07-22 NOTE — PROGRESS NOTES
Virtual Regular Visit      Assessment/Plan:    Problem List Items Addressed This Visit        Digestive    Gastroesophageal reflux disease - Primary    Relevant Medications    omeprazole (PriLOSEC) 20 mg delayed release capsule    Other Relevant Orders    Ambulatory referral to Gastroenterology    Calculus of gallbladder without cholecystitis without obstruction    Relevant Orders    Ambulatory referral to Gastroenterology      Other Visit Diagnoses     Nausea        Relevant Medications    omeprazole (PriLOSEC) 20 mg delayed release capsule        - Start omeprazole 20 mg daily  Differential to include GERD, PUD, biliary colic  Referral to GI provided  Advised to avoid greasy, fatty foods, tomato based foods, caffeine, and chocolate  Avoid NSAIDS, use tylenol prn for pain  Reason for visit is   Chief Complaint   Patient presents with    Abdominal Pain     upper right quadrant pain for weeks, now complaining of pain in the left side    Nausea    Virtual Regular Visit        Encounter provider Jayson Restrepo PA-C    Provider located at [address] [address]      Recent Visits  No visits were found meeting these conditions  Showing recent visits within past 7 days and meeting all other requirements     Today's Visits  Date Type Provider Dept   [facility]20 [facility] [address] [facility] today's visits and meeting all other requirements     Future Appointments  No visits were found meeting these conditions  Showing future appointments within next 150 days and meeting all other requirements        The patient was identified by name and date of birth  Elise Gonzalez was informed that this is a telemedicine visit and that the visit is being conducted through [facility] and patient was informed that this is not a secure, HIPAA-complaint platform  She agrees to proceed     My office door was closed  No one else was in the room  She acknowledged consent and understanding of privacy and security of the video platform  The patient has agreed to participate and understands they can discontinue the visit at any time  Patient is aware this is a billable service  Subjective  Andree Wolfe is a 27 y o  female  Joshua Stern is a 27year old female with history of GERD and cholelithiasis, presenting with concern for ongoing abdominal pain and nausea  Patient was seen in the ED 2019 with abdominal pain  Abdominal US at that time revealed cholelithiasis without acute cholecystitis, and hepatomegaly  Patient reports that since then she has continued to have diffuse upper abdominal pains described as achy and sometimes sharp and radiating to her mid back  States that she is constantly uncomfortable  She has been taking ibuprofen with little relief  She reports a long history of GERD starting around age 13  She has been on Pepcid in the past and has been taking tums daily  Admits to associated nausea but denies trouble swallowing, vomiting, diarrhea, or constipation  States that the pain is made worse with food  She has been avoiding fast food and notes that tomato sauces make her symptoms worse         Past Medical History:   Diagnosis Date    Anemia     Post partum depression     Rash of genital area     started a few weeks ago, red       Past Surgical History:   Procedure Laterality Date    APPENDECTOMY       SECTION      [place]  DELIVERY ONLY N/A 2019    Procedure:  SECTION () REPEAT;  Surgeon: Josie Mcbride MD;  Location: [facility];  Service: Obstetrics       Current Outpatient Medications   Medication Sig Dispense Refill    ibuprofen (MOTRIN) 600 mg tablet Take 1 tablet (600 mg total) by mouth every 6 (six) hours as needed (cramping) 30 tablet 0    omeprazole (PriLOSEC) 20 mg delayed release capsule Take 1 capsule (20 mg total) by mouth daily 30 capsule 2     No current facility-administered medications for this visit  No Known Allergies    Review of Systems   Constitutional: Negative for appetite change, chills, diaphoresis, fever and unexpected weight change  HENT: Negative for congestion, ear pain, postnasal drip, rhinorrhea, sinus pressure, sinus pain, sore throat and trouble swallowing  Eyes: Negative for photophobia, pain, discharge, redness, itching and visual disturbance  Respiratory: Negative for cough, chest tightness, shortness of breath and wheezing  Cardiovascular: Negative for chest pain, palpitations and leg swelling  Gastrointestinal: Positive for abdominal pain and nausea  Negative for constipation, diarrhea and vomiting  Genitourinary: Negative for decreased urine volume, difficulty urinating, dysuria, frequency, hematuria, menstrual problem, urgency, vaginal bleeding and vaginal discharge  Skin: Negative for rash and wound  Neurological: Negative for dizziness, syncope, weakness, light-headedness and headaches  Video Exam    There were no vitals filed for this visit  Physical Exam   Constitutional: She is oriented to person, place, and time  She appears well-developed and well-nourished  No distress  HENT:   Head: Normocephalic and atraumatic  Pulmonary/Chest: Effort normal  No respiratory distress  Neurological: She is alert and oriented to person, place, and time  Psychiatric: She has a normal mood and affect  Her behavior is normal         I spent 15 minutes directly with the patient during this visit      [address] acknowledges that she has consented to an online visit or consultation   She understands that the online visit is based solely on information provided by her, and that, in the absence of a face-to-face physical evaluation by the physician, the diagnosis she receives is both limited and provisional in terms of accuracy and completeness  This is not intended to replace a full medical face-to-face evaluation by the physician  Bryn First understands and accepts these terms

## 2020-08-06 ENCOUNTER — OFFICE VISIT (OUTPATIENT)
Dept: GASTROENTEROLOGY | Facility: CLINIC | Age: 30
End: 2020-08-06
Payer: COMMERCIAL

## 2020-08-06 VITALS
WEIGHT: 246 LBS | BODY MASS INDEX: 39.53 KG/M2 | HEIGHT: 66 IN | TEMPERATURE: 97.7 F | RESPIRATION RATE: 14 BRPM | DIASTOLIC BLOOD PRESSURE: 68 MMHG | SYSTOLIC BLOOD PRESSURE: 120 MMHG | HEART RATE: 75 BPM

## 2020-08-06 DIAGNOSIS — K21.9 GASTROESOPHAGEAL REFLUX DISEASE, ESOPHAGITIS PRESENCE NOT SPECIFIED: ICD-10-CM

## 2020-08-06 DIAGNOSIS — K80.20 CALCULUS OF GALLBLADDER WITHOUT CHOLECYSTITIS WITHOUT OBSTRUCTION: ICD-10-CM

## 2020-08-06 DIAGNOSIS — R10.13 EPIGASTRIC ABDOMINAL PAIN: Primary | ICD-10-CM

## 2020-08-06 PROCEDURE — 99243 OFF/OP CNSLTJ NEW/EST LOW 30: CPT | Performed by: INTERNAL MEDICINE

## 2020-08-06 NOTE — PROGRESS NOTES
[facility] Gastroenterology  Gastroenterology Outpatient Consultation  Patient Chente Wheeler   Age 27 y o  Gender female   MRN: [id number]  [facility] [id number]     ASSESSMENT AND PLAN:   Problem List Items Addressed This Visit        Digestive    Gastroesophageal reflux disease     Patient has had reflux dating back many many years  She has been managed mainly with antacids  I agree with using omeprazole 20 mg daily  She should try to take this 30 minutes to 1 hour before 1 meal a day  I did suggested she limit NSAID use  She has been using Motrin 600 mg a couple times a week  Lifestyle modifications for gastroesophageal reflux disease were discussed and include limiting fried and fatty foods, mints, chocolates, carbonated and caffeinated beverages , and alcohol, etc   Avoid lying down for 2-3 hours after meals  If you have nighttime symptoms consider raising the head of the bed up on 4-6 inch blocks  Pillows typically are not useful  If you are overweight, weight loss will be helpful  I am holding off on upper endoscopy at this time  However if symptoms persist despite omeprazole, I would consider upper endoscopy         Calculus of gallbladder without cholecystitis without obstruction     Patient was noted to have gallstones on ultrasound when she presented to the ER with her 4th attack of epigastric pain  Suspect her pain was gallbladder related  LFTs were normal   Common bile duct was 3 mm  Doubt common bile duct stone at that time  However repeat LFTs amylase lipase at this time  Relevant Orders    Amylase    Hepatic function panel    Lipase    Ambulatory referral to General Surgery       Other    Epigastric abdominal pain - Primary     Patient had 4 attacks of epigastric abdominal pain radiating through the back and to the right and left side over 1 month following delivery of her 2nd child  This was in November    Since November she has been on a relatively low fat diet and has minimal pain but has had nausea  Whether not the nausea is related to this is not clear  That could also be related to her reflux  Her LFTs were normal   Lipase was mildly elevated in the ER  I would recommend repeating liver enzymes  Repeat amylase and lipase  I would suggest general surgical consultation  If liver enzymes are abnormal then I would recommend MRI MRCP prior to cholecystectomy  Continue low-fat diet         Relevant Orders    Amylase    Hepatic function panel    Lipase    Ambulatory referral to General Surgery         _____________________________________________________________    HPI:  Germán Adams is a delightful 51-year-old woman who I had the opportunity see in the office in consultation for evaluation of epigastric abdominal pain and reflux  Patient had a  on  where she delivered a healthy baby girl  Soon after that she developed 4 tacks of epigastric pain which occurred over 4 weeks  These episodes of pain was sharp in the epigastric region radiating out to the right and left side and through to the back  Three of the episodes may have been food related 1 was not  There is no associated nausea vomiting at that time  There is no fever or jaundice  The initial 2 episodes may have lasted 30-35 minutes the 2nd 2 episodes lasted about 45 minutes to 1 hour  She was seen in the emergency room on 2019  Ultrasound was performed and revealed hepatomegaly in addition to gallstones without definite sonographic evidence of acute cholecystitis  Her liver enzymes are normal at that time in her white blood cell count was normal at 6 2  Since then she has tried to maintain a relatively low fat diet  Although she freely admits she does not eat a very healthy diet  She has had some minor discomfort in the left side and a little bit in the right upper quadrant but no significant attacks of pain    She has had some nausea maybe a couple days per week but no vomiting  She does report having reflux symptoms dating back to her teenage years for which she will take Pepto-Bismol or Tums  She was just recently prescribed Prilosec for this but has not yet started taking it  There has been no dysphagia or early satiety  There has been no unintentional weight loss  She does use ibuprofen 600 mg maybe a couple times per week  Her bowel habits are very regular, she denies any diarrhea, constipation, bleeding or black stools  There is no family history colon cancer colon polyps  She does not smoke tobacco and does not drink alcohol  No Known Allergies  Current Outpatient Medications   Medication Sig Dispense Refill    ibuprofen (MOTRIN) 600 mg tablet Take 1 tablet (600 mg total) by mouth every 6 (six) hours as needed (cramping) 30 tablet 0    omeprazole (PriLOSEC) 20 mg delayed release capsule Take 1 capsule (20 mg total) by mouth daily 30 capsule 2     No current facility-administered medications for this visit        MEDICAL HISTORY:  Past Medical History:   Diagnosis Date    Anemia     Post partum depression     Rash of genital area     started a few weeks ago, red     Past Surgical History:   Procedure Laterality Date    APPENDECTOMY       SECTION      [place]  DELIVERY ONLY N/A 2019    Procedure:  SECTION () REPEAT;  Surgeon: Cecil Arriaza MD;  Location: [facility];  Service: Obstetrics     Social History     Substance and Sexual Activity   Alcohol Use No     Social History     Substance and Sexual Activity   Drug Use Not Currently     Social History     Tobacco Use   Smoking Status Former Smoker    Last attempt to quit: 2017    Years since quitting: 3 0   Smokeless Tobacco Never Used     Family History   Problem Relation Age of Onset    Wibaux's disease Family     Greg's disease Mother     No Known Problems Father     No Known Problems Sister     No Known Problems Brother        REVIEW OF SYSTEMS:  CONSTITUTIONAL: Denies any fever, chills, rigors, and weight loss  HEENT: No earache or tinnitus  Denies hearing loss or visual disturbances  CARDIOVASCULAR: No chest pain or palpitations  RESPIRATORY: Denies any cough, hemoptysis, shortness of breath or dyspnea on exertion  GASTROINTESTINAL: As noted in the History of Present Illness  GENITOURINARY: No problems with urination  Denies any hematuria or dysuria  NEUROLOGIC: No dizziness  She has sense of vertigo in the past   MUSCULOSKELETAL: Denies any muscle or joint pain  SKIN: Denies skin rashes or itching  ENDOCRINE: Denies excessive thirst  Denies intolerance to heat or cold  PSYCHOSOCIAL:  She does report some anxiety  No depression  Objective   Blood pressure 120/68, pulse 75, temperature 97 7 °F (36 5 °C), temperature source Temporal, resp  rate 14, height 5' 6" (1 676 m), weight 112 kg (246 lb), currently breastfeeding  Body mass index is 39 71 kg/m²  PHYSICAL EXAM:   General Appearance: Alert, cooperative, no distress  HEENT: Normocephalic, atraumatic, anicteric  Neck: Supple, symmetrical, trachea midline  Lungs: Clear to auscultation bilaterally; no rales, rhonchi or wheezing; respirations unlabored   Heart: Regular rate and rhythm; no murmur, rub, or gallop  Abdomen: Soft, bowel sounds normal, non-tender, non-distended; no masses, there is no hepatosplenomegaly  No spider angiomas  Genitalia: Deferred   Rectal: Deferred   Extremities: No cyanosis, clubbing or edema   Skin: No jaundice, rashes, or lesions   Lymph nodes: No palpable cervical lymphadenopathy   Lab Results:   No visits with results within 2 Month(s) from this visit     Latest known visit with results is:   Admission on 11/26/2019, Discharged on 11/26/2019   Component Date Value    WBC 11/26/2019 6 20     RBC 11/26/2019 4 41     Hemoglobin 11/26/2019 13 2     Hematocrit 11/26/2019 38 5*    MCV 11/26/2019 87     MCH 11/26/2019 30 0     MCHC 11/26/2019 34 4     RDW 11/26/2019 13 3     MPV 11/26/2019 6 7*    Platelets 74/89/6979 322     Neutrophils Relative 11/26/2019 72     Lymphocytes Relative 11/26/2019 19*    Monocytes Relative 11/26/2019 7     Eosinophils Relative 11/26/2019 2     Basophils Relative 11/26/2019 1     Neutrophils Absolute 11/26/2019 4 50     Lymphocytes Absolute 11/26/2019 1 20     Monocytes Absolute 11/26/2019 0 40     Eosinophils Absolute 11/26/2019 0 10     Basophils Absolute 11/26/2019 0 10     Sodium 11/26/2019 141     Potassium 11/26/2019 4 0     Chloride 11/26/2019 102     CO2 11/26/2019 30     ANION GAP 11/26/2019 9     BUN 11/26/2019 21     Creatinine 11/26/2019 0 84     Glucose 11/26/2019 87     Calcium 11/26/2019 9 8     AST 11/26/2019 17     ALT 11/26/2019 13     Alkaline Phosphatase 11/26/2019 64     Total Protein 11/26/2019 8 0     Albumin 11/26/2019 4 6     Total Bilirubin 11/26/2019 0 30     eGFR 11/26/2019 94     Lipase 11/26/2019 145*    EXT PREG TEST UR (Ref: N* 11/26/2019 positive     Control 11/26/2019 valid     Color, UA 11/26/2019 Yellow     Clarity, UA 11/26/2019 Clear     Specific Gravity, UA 11/26/2019 1 015     pH, UA 11/26/2019 6 5     Leukocytes, UA 11/26/2019 2+*    Nitrite, UA 11/26/2019 Negative     Protein, UA 11/26/2019 Trace*    Glucose, UA 11/26/2019 Negative     Ketones, UA 11/26/2019 Negative     Urobilinogen, UA 11/26/2019 0 2     Bilirubin, UA 11/26/2019 Negative     Blood, UA 11/26/2019 Trace-Intact*    RBC, UA 11/26/2019 0-1*    WBC, UA 11/26/2019 20-30*    Epithelial Cells 11/26/2019 Occasional     Bacteria, UA 11/26/2019 Occasional     MUCUS THREADS 11/26/2019 Moderate*    Urine Culture 11/26/2019 >100,000 cfu/ml      Urine Culture 11/26/2019 <10,000 cfu/ml Beta Hemolytic Streptococcus Group B*    HCG, Quant 11/26/2019 5 11      Radiology Results:   No results found    One Ramiro Cordero,    08/06/20   Cc:

## 2020-08-06 NOTE — ASSESSMENT & PLAN NOTE
Patient had 4 attacks of epigastric abdominal pain radiating through the back and to the right and left side over 1 month following delivery of her 2nd child  This was in November  Since November she has been on a relatively low fat diet and has minimal pain but has had nausea  Whether not the nausea is related to this is not clear  That could also be related to her reflux  Her LFTs were normal   Lipase was mildly elevated in the ER  I would recommend repeating liver enzymes  Repeat amylase and lipase  I would suggest general surgical consultation  If liver enzymes are abnormal then I would recommend MRI MRCP prior to cholecystectomy    Continue low-fat diet

## 2020-08-06 NOTE — ASSESSMENT & PLAN NOTE
Patient was noted to have gallstones on ultrasound when she presented to the ER with her 4th attack of epigastric pain  Suspect her pain was gallbladder related  LFTs were normal   Common bile duct was 3 mm  Doubt common bile duct stone at that time  However repeat LFTs amylase lipase at this time

## 2020-08-06 NOTE — PATIENT INSTRUCTIONS
Epigastric abdominal pain  Patient had 4 attacks of epigastric abdominal pain radiating through the back and to the right and left side over 1 month following delivery of her 2nd child  This was in November  Since November she has been on a relatively low fat diet and has minimal pain but has had nausea  Whether not the nausea is related to this is not clear  That could also be related to her reflux  Her LFTs were normal   Lipase was mildly elevated in the ER  I would recommend repeating liver enzymes  Repeat amylase and lipase  I would suggest general surgical consultation  If liver enzymes are abnormal then I would recommend MRI MRCP prior to cholecystectomy  Continue low-fat diet    Calculus of gallbladder without cholecystitis without obstruction  Patient was noted to have gallstones on ultrasound when she presented to the ER with her 4th attack of epigastric pain  Suspect her pain was gallbladder related  LFTs were normal   Common bile duct was 3 mm  Doubt common bile duct stone at that time  However repeat LFTs amylase lipase at this time  Gastroesophageal reflux disease  Patient has had reflux dating back many many years  She has been managed mainly with antacids  I agree with using omeprazole 20 mg daily  She should try to take this 30 minutes to 1 hour before 1 meal a day  I did suggested she limit NSAID use  She has been using Motrin 600 mg a couple times a week  Lifestyle modifications for gastroesophageal reflux disease were discussed and include limiting fried and fatty foods, mints, chocolates, carbonated and caffeinated beverages , and alcohol, etc   Avoid lying down for 2-3 hours after meals  If you have nighttime symptoms consider raising the head of the bed up on 4-6 inch blocks  Pillows typically are not useful  If you are overweight, weight loss will be helpful  I am holding off on upper endoscopy at this time    However if symptoms persist despite omeprazole, I would consider upper endoscopy

## 2020-08-06 NOTE — ASSESSMENT & PLAN NOTE
Patient has had reflux dating back many many years  She has been managed mainly with antacids  I agree with using omeprazole 20 mg daily  She should try to take this 30 minutes to 1 hour before 1 meal a day  I did suggested she limit NSAID use  She has been using Motrin 600 mg a couple times a week  Lifestyle modifications for gastroesophageal reflux disease were discussed and include limiting fried and fatty foods, mints, chocolates, carbonated and caffeinated beverages , and alcohol, etc   Avoid lying down for 2-3 hours after meals  If you have nighttime symptoms consider raising the head of the bed up on 4-6 inch blocks  Pillows typically are not useful  If you are overweight, weight loss will be helpful  I am holding off on upper endoscopy at this time    However if symptoms persist despite omeprazole, I would consider upper endoscopy

## 2020-08-06 NOTE — LETTER
August 6, 2020     Micky Gaffney PA-C  [address]    Patient: Shaunna Redmond   YOB: 1990   Date of Visit: 8/6/2020       Dear Dr Avila Promise: Thank you for referring Anthony Villareal to me for evaluation  Below are my notes for this consultation  If you have questions, please do not hesitate to call me  I look forward to following your patient along with you  Sincerely,        Lenny Madera DO        CC: MD Lenny Prabhakar DO  8/6/2020 10:37 AM  Incomplete  [facility]  Gastroenterology Outpatient Consultation  Patient Shaunna Redmond   Age 27 y o  Gender female   MRN: [id number]  [facility] [id number]     ASSESSMENT AND PLAN:   Problem List Items Addressed This Visit        Digestive    Gastroesophageal reflux disease     Patient has had reflux dating back many many years  She has been managed mainly with antacids  I agree with using omeprazole 20 mg daily  She should try to take this 30 minutes to 1 hour before 1 meal a day  I did suggested she limit NSAID use  She has been using Motrin 600 mg a couple times a week  Lifestyle modifications for gastroesophageal reflux disease were discussed and include limiting fried and fatty foods, mints, chocolates, carbonated and caffeinated beverages , and alcohol, etc   Avoid lying down for 2-3 hours after meals  If you have nighttime symptoms consider raising the head of the bed up on 4-6 inch blocks  Pillows typically are not useful  If you are overweight, weight loss will be helpful  I am holding off on upper endoscopy at this time  However if symptoms persist despite omeprazole, I would consider upper endoscopy         Calculus of gallbladder without cholecystitis without obstruction     Patient was noted to have gallstones on ultrasound when she presented to the ER with her 4th attack of epigastric pain  Suspect her pain was gallbladder related    LFTs were normal   Common bile duct was 3 mm  Doubt common bile duct stone at that time  However repeat LFTs amylase lipase at this time  Relevant Orders    Amylase    Hepatic function panel    Lipase    Ambulatory referral to General Surgery       Other    Epigastric abdominal pain - Primary     Patient had 4 attacks of epigastric abdominal pain radiating through the back and to the right and left side over 1 month following delivery of her 2nd child  This was in November  Since November she has been on a relatively low fat diet and has minimal pain but has had nausea  Whether not the nausea is related to this is not clear  That could also be related to her reflux  Her LFTs were normal   Lipase was mildly elevated in the ER  I would recommend repeating liver enzymes  Repeat amylase and lipase  I would suggest general surgical consultation  If liver enzymes are abnormal then I would recommend MRI MRCP prior to cholecystectomy  Continue low-fat diet         Relevant Orders    Amylase    Hepatic function panel    Lipase    Ambulatory referral to General Surgery         _____________________________________________________________    HPI:  Bety Ross is a delightful 63-year-old woman who I had the opportunity see in the office in consultation for evaluation of epigastric abdominal pain and reflux  Patient had a  on  where she delivered a healthy baby girl  Soon after that she developed 4 tacks of epigastric pain which occurred over 4 weeks  These episodes of pain was sharp in the epigastric region radiating out to the right and left side and through to the back  Three of the episodes may have been food related 1 was not  There is no associated nausea vomiting at that time  There is no fever or jaundice  The initial 2 episodes may have lasted 30-35 minutes the 2nd 2 episodes lasted about 45 minutes to 1 hour  She was seen in the emergency room on 2019   Ultrasound was performed and revealed hepatomegaly in addition to gallstones without definite sonographic evidence of acute cholecystitis  Her liver enzymes are normal at that time in her white blood cell count was normal at 6 2  Since then she has tried to maintain a relatively low fat diet  Although she freely admits she does not eat a very healthy diet  She has had some minor discomfort in the left side and a little bit in the right upper quadrant but no significant attacks of pain  She has had some nausea maybe a couple days per week but no vomiting  She does report having reflux symptoms dating back to her teenage years for which she will take Pepto-Bismol or Tums  She was just recently prescribed Prilosec for this but has not yet started taking it  There has been no dysphagia or early satiety  There has been no unintentional weight loss  She does use ibuprofen 600 mg maybe a couple times per week  Her bowel habits are very regular, she denies any diarrhea, constipation, bleeding or black stools  There is no family history colon cancer colon polyps  She does not smoke tobacco and does not drink alcohol  No Known Allergies  Current Outpatient Medications   Medication Sig Dispense Refill    ibuprofen (MOTRIN) 600 mg tablet Take 1 tablet (600 mg total) by mouth every 6 (six) hours as needed (cramping) 30 tablet 0    omeprazole (PriLOSEC) 20 mg delayed release capsule Take 1 capsule (20 mg total) by mouth daily 30 capsule 2     No current facility-administered medications for this visit        MEDICAL HISTORY:  Past Medical History:   Diagnosis Date    Anemia     Post partum depression     Rash of genital area     started a few weeks ago, red     Past Surgical History:   Procedure Laterality Date    APPENDECTOMY       SECTION      [place]  DELIVERY ONLY N/A 2019    Procedure:  SECTION () REPEAT;  Surgeon: Jhon Wilson MD;  Location: [facility];  Service: Obstetrics     Social History     Substance and Sexual Activity   Alcohol Use No     Social History     Substance and Sexual Activity   Drug Use Not Currently     Social History     Tobacco Use   Smoking Status Former Smoker    Last attempt to quit: 7/14/2017    Years since quitting: 3 0   Smokeless Tobacco Never Used     Family History   Problem Relation Age of Onset    Yancey's disease Family     Yancey's disease Mother     No Known Problems Father     No Known Problems Sister     No Known Problems Brother        REVIEW OF SYSTEMS:  CONSTITUTIONAL: Denies any fever, chills, rigors, and weight loss  HEENT: No earache or tinnitus  Denies hearing loss or visual disturbances  CARDIOVASCULAR: No chest pain or palpitations  RESPIRATORY: Denies any cough, hemoptysis, shortness of breath or dyspnea on exertion  GASTROINTESTINAL: As noted in the History of Present Illness  GENITOURINARY: No problems with urination  Denies any hematuria or dysuria  NEUROLOGIC: No dizziness  She has sense of vertigo in the past   MUSCULOSKELETAL: Denies any muscle or joint pain  SKIN: Denies skin rashes or itching  ENDOCRINE: Denies excessive thirst  Denies intolerance to heat or cold  PSYCHOSOCIAL:  She does report some anxiety  No depression  Objective   Blood pressure 120/68, pulse 75, temperature 97 7 °F (36 5 °C), temperature source Temporal, resp  rate 14, height 5' 6" (1 676 m), weight 112 kg (246 lb), currently breastfeeding  Body mass index is 39 71 kg/m²  PHYSICAL EXAM:   General Appearance: Alert, cooperative, no distress  HEENT: Normocephalic, atraumatic, anicteric  Neck: Supple, symmetrical, trachea midline  Lungs: Clear to auscultation bilaterally; no rales, rhonchi or wheezing; respirations unlabored   Heart: Regular rate and rhythm; no murmur, rub, or gallop  Abdomen: Soft, bowel sounds normal, non-tender, non-distended; no masses, there is no hepatosplenomegaly   No spider angiomas  Genitalia: Deferred   Rectal: Deferred   Extremities: No cyanosis, clubbing or edema   Skin: No jaundice, rashes, or lesions   Lymph nodes: No palpable cervical lymphadenopathy   Lab Results:   No visits with results within 2 Month(s) from this visit     Latest known visit with results is:   Admission on 11/26/2019, Discharged on 11/26/2019   Component Date Value    WBC 11/26/2019 6 20     RBC 11/26/2019 4 41     Hemoglobin 11/26/2019 13 2     Hematocrit 11/26/2019 38 5*    MCV 11/26/2019 87     MCH 11/26/2019 30 0     MCHC 11/26/2019 34 4     RDW 11/26/2019 13 3     MPV 11/26/2019 6 7*    Platelets 52/73/0466 322     Neutrophils Relative 11/26/2019 72     Lymphocytes Relative 11/26/2019 19*    Monocytes Relative 11/26/2019 7     Eosinophils Relative 11/26/2019 2     Basophils Relative 11/26/2019 1     Neutrophils Absolute 11/26/2019 4 50     Lymphocytes Absolute 11/26/2019 1 20     Monocytes Absolute 11/26/2019 0 40     Eosinophils Absolute 11/26/2019 0 10     Basophils Absolute 11/26/2019 0 10     Sodium 11/26/2019 141     Potassium 11/26/2019 4 0     Chloride 11/26/2019 102     CO2 11/26/2019 30     ANION GAP 11/26/2019 9     BUN 11/26/2019 21     Creatinine 11/26/2019 0 84     Glucose 11/26/2019 87     Calcium 11/26/2019 9 8     AST 11/26/2019 17     ALT 11/26/2019 13     Alkaline Phosphatase 11/26/2019 64     Total Protein 11/26/2019 8 0     Albumin 11/26/2019 4 6     Total Bilirubin 11/26/2019 0 30     eGFR 11/26/2019 94     Lipase 11/26/2019 145*    EXT PREG TEST UR (Ref: N* 11/26/2019 positive     Control 11/26/2019 valid     Color, UA 11/26/2019 Yellow     Clarity, UA 11/26/2019 Clear     Specific Gravity, UA 11/26/2019 1 015     pH, UA 11/26/2019 6 5     Leukocytes, UA 11/26/2019 2+*    Nitrite, UA 11/26/2019 Negative     Protein, UA 11/26/2019 Trace*    Glucose, UA 11/26/2019 Negative     Ketones, UA 11/26/2019 Negative     Urobilinogen, UA 11/26/2019 0 2     Bilirubin, UA 11/26/2019 Negative     Blood, UA 11/26/2019 Trace-Intact*    RBC, UA 11/26/2019 0-1*    WBC, UA 11/26/2019 20-30*    Epithelial Cells 11/26/2019 Occasional     Bacteria, UA 11/26/2019 Occasional     MUCUS THREADS 11/26/2019 Moderate*    Urine Culture 11/26/2019 >100,000 cfu/ml      Urine Culture 11/26/2019 <10,000 cfu/ml Beta Hemolytic Streptococcus Group B*    HCG, Quant 11/26/2019 5 11      Radiology Results:   No results found    One Ramiro Cordero DO   08/06/20   Cc:

## 2020-08-21 ENCOUNTER — TELEPHONE (OUTPATIENT)
Dept: OTHER | Facility: OTHER | Age: 30
End: 2020-08-21

## 2020-08-21 ENCOUNTER — TELEPHONE (OUTPATIENT)
Dept: SURGERY | Facility: CLINIC | Age: 30
End: 2020-08-21

## 2020-08-21 NOTE — TELEPHONE ENCOUNTER
Patient cancelled today's appt through the answering service  I was able to speak with her to offer a new appt and she advised that she will have to call back to reschedule

## 2020-09-23 NOTE — TELEPHONE ENCOUNTER
Called and spoke to pt in regards to referral for GB  She said that she will have to call back as right now with her kids being home schooled it is hard for her to make an appt

## 2020-10-07 ENCOUNTER — CONSULT (OUTPATIENT)
Dept: SURGERY | Facility: CLINIC | Age: 30
End: 2020-10-07
Payer: COMMERCIAL

## 2020-10-07 VITALS
TEMPERATURE: 97.6 F | WEIGHT: 254 LBS | DIASTOLIC BLOOD PRESSURE: 72 MMHG | HEART RATE: 89 BPM | HEIGHT: 66 IN | RESPIRATION RATE: 16 BRPM | BODY MASS INDEX: 40.82 KG/M2 | SYSTOLIC BLOOD PRESSURE: 120 MMHG

## 2020-10-07 DIAGNOSIS — K80.20 CALCULUS OF GALLBLADDER WITHOUT CHOLECYSTITIS WITHOUT OBSTRUCTION: ICD-10-CM

## 2020-10-07 DIAGNOSIS — R10.13 EPIGASTRIC ABDOMINAL PAIN: ICD-10-CM

## 2020-10-07 PROCEDURE — 99244 OFF/OP CNSLTJ NEW/EST MOD 40: CPT | Performed by: SURGERY

## 2020-10-07 RX ORDER — ONDANSETRON 2 MG/ML
4 INJECTION INTRAMUSCULAR; INTRAVENOUS ONCE
Status: CANCELLED | OUTPATIENT
Start: 2020-10-07 | End: 2020-10-07

## 2020-10-07 RX ORDER — SODIUM CHLORIDE, SODIUM LACTATE, POTASSIUM CHLORIDE, CALCIUM CHLORIDE 600; 310; 30; 20 MG/100ML; MG/100ML; MG/100ML; MG/100ML
125 INJECTION, SOLUTION INTRAVENOUS CONTINUOUS
Status: CANCELLED | OUTPATIENT
Start: 2020-10-07

## 2020-10-15 ENCOUNTER — TELEPHONE (OUTPATIENT)
Dept: GASTROENTEROLOGY | Facility: CLINIC | Age: 30
End: 2020-10-15

## 2020-11-06 ENCOUNTER — TELEPHONE (OUTPATIENT)
Dept: SURGERY | Facility: CLINIC | Age: 30
End: 2020-11-06

## 2020-11-11 ENCOUNTER — ANESTHESIA EVENT (OUTPATIENT)
Dept: PERIOP | Facility: HOSPITAL | Age: 30
End: 2020-11-11
Payer: COMMERCIAL

## 2020-11-12 ENCOUNTER — APPOINTMENT (OUTPATIENT)
Dept: RADIOLOGY | Facility: HOSPITAL | Age: 30
End: 2020-11-12
Payer: COMMERCIAL

## 2020-11-12 ENCOUNTER — HOSPITAL ENCOUNTER (OUTPATIENT)
Facility: HOSPITAL | Age: 30
Setting detail: OUTPATIENT SURGERY
Discharge: HOME | End: 2020-11-12
Attending: SURGERY | Admitting: SURGERY
Payer: COMMERCIAL

## 2020-11-12 ENCOUNTER — ANESTHESIA (OUTPATIENT)
Dept: PERIOP | Facility: HOSPITAL | Age: 30
End: 2020-11-12
Payer: COMMERCIAL

## 2020-11-12 VITALS
BODY MASS INDEX: 40.82 KG/M2 | DIASTOLIC BLOOD PRESSURE: 69 MMHG | HEIGHT: 66 IN | WEIGHT: 254 LBS | HEART RATE: 73 BPM | RESPIRATION RATE: 18 BRPM | SYSTOLIC BLOOD PRESSURE: 124 MMHG | OXYGEN SATURATION: 99 % | TEMPERATURE: 97.8 F

## 2020-11-12 VITALS — HEART RATE: 53 BPM

## 2020-11-12 DIAGNOSIS — K80.10 CALCULUS OF GALLBLADDER WITH CHRONIC CHOLECYSTITIS WITHOUT OBSTRUCTION: Primary | ICD-10-CM

## 2020-11-12 DIAGNOSIS — K80.42 CHOLEDOCHOLITHIASIS WITH ACUTE CHOLECYSTITIS: ICD-10-CM

## 2020-11-12 DIAGNOSIS — K80.20 CALCULUS OF GALLBLADDER WITHOUT CHOLECYSTITIS WITHOUT OBSTRUCTION: ICD-10-CM

## 2020-11-12 LAB
EXT PREGNANCY TEST URINE: NEGATIVE
EXT. CONTROL: NORMAL

## 2020-11-12 PROCEDURE — 47563 LAPARO CHOLECYSTECTOMY/GRAPH: CPT | Performed by: SURGERY

## 2020-11-12 PROCEDURE — 81025 URINE PREGNANCY TEST: CPT | Performed by: SURGERY

## 2020-11-12 PROCEDURE — 88304 TISSUE EXAM BY PATHOLOGIST: CPT | Performed by: PATHOLOGY

## 2020-11-12 PROCEDURE — 47563 LAPARO CHOLECYSTECTOMY/GRAPH: CPT | Performed by: PHYSICIAN ASSISTANT

## 2020-11-12 PROCEDURE — 74300 X-RAY BILE DUCTS/PANCREAS: CPT

## 2020-11-12 PROCEDURE — 99024 POSTOP FOLLOW-UP VISIT: CPT | Performed by: SURGERY

## 2020-11-12 RX ORDER — FENTANYL CITRATE/PF 50 MCG/ML
25 SYRINGE (ML) INJECTION
Status: DISCONTINUED | OUTPATIENT
Start: 2020-11-12 | End: 2020-11-12 | Stop reason: HOSPADM

## 2020-11-12 RX ORDER — DEXAMETHASONE SODIUM PHOSPHATE 10 MG/ML
INJECTION, SOLUTION INTRAMUSCULAR; INTRAVENOUS AS NEEDED
Status: DISCONTINUED | OUTPATIENT
Start: 2020-11-12 | End: 2020-11-12

## 2020-11-12 RX ORDER — BUPIVACAINE HYDROCHLORIDE 5 MG/ML
INJECTION, SOLUTION PERINEURAL AS NEEDED
Status: DISCONTINUED | OUTPATIENT
Start: 2020-11-12 | End: 2020-11-12 | Stop reason: HOSPADM

## 2020-11-12 RX ORDER — SODIUM CHLORIDE, SODIUM LACTATE, POTASSIUM CHLORIDE, CALCIUM CHLORIDE 600; 310; 30; 20 MG/100ML; MG/100ML; MG/100ML; MG/100ML
75 INJECTION, SOLUTION INTRAVENOUS CONTINUOUS
Status: DISCONTINUED | OUTPATIENT
Start: 2020-11-12 | End: 2020-11-12 | Stop reason: HOSPADM

## 2020-11-12 RX ORDER — ONDANSETRON 2 MG/ML
4 INJECTION INTRAMUSCULAR; INTRAVENOUS ONCE
Status: DISCONTINUED | OUTPATIENT
Start: 2020-11-12 | End: 2020-11-12 | Stop reason: HOSPADM

## 2020-11-12 RX ORDER — KETOROLAC TROMETHAMINE 30 MG/ML
INJECTION, SOLUTION INTRAMUSCULAR; INTRAVENOUS AS NEEDED
Status: DISCONTINUED | OUTPATIENT
Start: 2020-11-12 | End: 2020-11-12

## 2020-11-12 RX ORDER — LIDOCAINE HYDROCHLORIDE 10 MG/ML
INJECTION, SOLUTION EPIDURAL; INFILTRATION; INTRACAUDAL; PERINEURAL AS NEEDED
Status: DISCONTINUED | OUTPATIENT
Start: 2020-11-12 | End: 2020-11-12

## 2020-11-12 RX ORDER — NEOSTIGMINE METHYLSULFATE 1 MG/ML
INJECTION INTRAVENOUS AS NEEDED
Status: DISCONTINUED | OUTPATIENT
Start: 2020-11-12 | End: 2020-11-12

## 2020-11-12 RX ORDER — SODIUM CHLORIDE, SODIUM LACTATE, POTASSIUM CHLORIDE, CALCIUM CHLORIDE 600; 310; 30; 20 MG/100ML; MG/100ML; MG/100ML; MG/100ML
125 INJECTION, SOLUTION INTRAVENOUS CONTINUOUS
Status: DISCONTINUED | OUTPATIENT
Start: 2020-11-12 | End: 2020-11-12

## 2020-11-12 RX ORDER — HYDROCODONE BITARTRATE AND ACETAMINOPHEN 5; 325 MG/1; MG/1
2 TABLET ORAL EVERY 6 HOURS PRN
Status: DISCONTINUED | OUTPATIENT
Start: 2020-11-12 | End: 2020-11-12 | Stop reason: HOSPADM

## 2020-11-12 RX ORDER — GLYCOPYRROLATE 0.2 MG/ML
INJECTION INTRAMUSCULAR; INTRAVENOUS AS NEEDED
Status: DISCONTINUED | OUTPATIENT
Start: 2020-11-12 | End: 2020-11-12

## 2020-11-12 RX ORDER — CEFAZOLIN SODIUM 2 G/50ML
2000 SOLUTION INTRAVENOUS ONCE
Status: COMPLETED | OUTPATIENT
Start: 2020-11-12 | End: 2020-11-12

## 2020-11-12 RX ORDER — ONDANSETRON 2 MG/ML
INJECTION INTRAMUSCULAR; INTRAVENOUS AS NEEDED
Status: DISCONTINUED | OUTPATIENT
Start: 2020-11-12 | End: 2020-11-12

## 2020-11-12 RX ORDER — ONDANSETRON 2 MG/ML
4 INJECTION INTRAMUSCULAR; INTRAVENOUS EVERY 6 HOURS PRN
Status: DISCONTINUED | OUTPATIENT
Start: 2020-11-12 | End: 2020-11-12 | Stop reason: HOSPADM

## 2020-11-12 RX ORDER — ACETAMINOPHEN 325 MG/1
650 TABLET ORAL EVERY 6 HOURS PRN
Status: DISCONTINUED | OUTPATIENT
Start: 2020-11-12 | End: 2020-11-12 | Stop reason: HOSPADM

## 2020-11-12 RX ORDER — PROPOFOL 10 MG/ML
INJECTION, EMULSION INTRAVENOUS AS NEEDED
Status: DISCONTINUED | OUTPATIENT
Start: 2020-11-12 | End: 2020-11-12

## 2020-11-12 RX ORDER — MIDAZOLAM HYDROCHLORIDE 2 MG/2ML
INJECTION, SOLUTION INTRAMUSCULAR; INTRAVENOUS AS NEEDED
Status: DISCONTINUED | OUTPATIENT
Start: 2020-11-12 | End: 2020-11-12

## 2020-11-12 RX ORDER — HYDROCODONE BITARTRATE AND ACETAMINOPHEN 5; 325 MG/1; MG/1
1 TABLET ORAL EVERY 6 HOURS PRN
Status: DISCONTINUED | OUTPATIENT
Start: 2020-11-12 | End: 2020-11-12 | Stop reason: HOSPADM

## 2020-11-12 RX ORDER — ROCURONIUM BROMIDE 10 MG/ML
INJECTION, SOLUTION INTRAVENOUS AS NEEDED
Status: DISCONTINUED | OUTPATIENT
Start: 2020-11-12 | End: 2020-11-12

## 2020-11-12 RX ORDER — HYDROCODONE BITARTRATE AND ACETAMINOPHEN 5; 325 MG/1; MG/1
1 TABLET ORAL EVERY 6 HOURS PRN
Qty: 12 TABLET | Refills: 0 | Status: SHIPPED | OUTPATIENT
Start: 2020-11-12 | End: 2020-11-22

## 2020-11-12 RX ORDER — HYDROMORPHONE HCL/PF 1 MG/ML
0.5 SYRINGE (ML) INJECTION
Status: DISCONTINUED | OUTPATIENT
Start: 2020-11-12 | End: 2020-11-12 | Stop reason: HOSPADM

## 2020-11-12 RX ORDER — ONDANSETRON 2 MG/ML
4 INJECTION INTRAMUSCULAR; INTRAVENOUS ONCE AS NEEDED
Status: DISCONTINUED | OUTPATIENT
Start: 2020-11-12 | End: 2020-11-12 | Stop reason: HOSPADM

## 2020-11-12 RX ORDER — FENTANYL CITRATE 50 UG/ML
INJECTION, SOLUTION INTRAMUSCULAR; INTRAVENOUS AS NEEDED
Status: DISCONTINUED | OUTPATIENT
Start: 2020-11-12 | End: 2020-11-12

## 2020-11-12 RX ORDER — SODIUM CHLORIDE, SODIUM LACTATE, POTASSIUM CHLORIDE, CALCIUM CHLORIDE 600; 310; 30; 20 MG/100ML; MG/100ML; MG/100ML; MG/100ML
125 INJECTION, SOLUTION INTRAVENOUS CONTINUOUS
Status: ACTIVE | OUTPATIENT
Start: 2020-11-12 | End: 2020-11-12

## 2020-11-12 RX ORDER — MAGNESIUM HYDROXIDE 1200 MG/15ML
LIQUID ORAL AS NEEDED
Status: DISCONTINUED | OUTPATIENT
Start: 2020-11-12 | End: 2020-11-12 | Stop reason: HOSPADM

## 2020-11-12 RX ADMIN — ONDANSETRON 4 MG: 2 INJECTION INTRAMUSCULAR; INTRAVENOUS at 08:29

## 2020-11-12 RX ADMIN — ROCURONIUM BROMIDE 40 MG: 10 INJECTION INTRAVENOUS at 07:46

## 2020-11-12 RX ADMIN — PHENYLEPHRINE HYDROCHLORIDE 100 MCG: 10 INJECTION INTRAVENOUS at 08:23

## 2020-11-12 RX ADMIN — CEFAZOLIN SODIUM 2000 MG: 2 SOLUTION INTRAVENOUS at 07:38

## 2020-11-12 RX ADMIN — LIDOCAINE HYDROCHLORIDE 50 MG: 10 INJECTION, SOLUTION EPIDURAL; INFILTRATION; INTRACAUDAL; PERINEURAL at 07:46

## 2020-11-12 RX ADMIN — MIDAZOLAM HYDROCHLORIDE 2 MG: 1 INJECTION, SOLUTION INTRAMUSCULAR; INTRAVENOUS at 07:41

## 2020-11-12 RX ADMIN — NEOSTIGMINE METHYLSULFATE 3 MG: 1 INJECTION, SOLUTION INTRAVENOUS at 08:31

## 2020-11-12 RX ADMIN — FENTANYL CITRATE 25 MCG: 50 INJECTION INTRAMUSCULAR; INTRAVENOUS at 08:36

## 2020-11-12 RX ADMIN — PHENYLEPHRINE HYDROCHLORIDE 200 MCG: 10 INJECTION INTRAVENOUS at 08:09

## 2020-11-12 RX ADMIN — PROPOFOL 200 MG: 10 INJECTION, EMULSION INTRAVENOUS at 07:46

## 2020-11-12 RX ADMIN — ONDANSETRON 4 MG: 2 INJECTION INTRAMUSCULAR; INTRAVENOUS at 09:44

## 2020-11-12 RX ADMIN — SODIUM CHLORIDE, SODIUM LACTATE, POTASSIUM CHLORIDE, AND CALCIUM CHLORIDE 125 ML/HR: .6; .31; .03; .02 INJECTION, SOLUTION INTRAVENOUS at 07:30

## 2020-11-12 RX ADMIN — SODIUM CHLORIDE, SODIUM LACTATE, POTASSIUM CHLORIDE, AND CALCIUM CHLORIDE: .6; .31; .03; .02 INJECTION, SOLUTION INTRAVENOUS at 07:43

## 2020-11-12 RX ADMIN — FENTANYL CITRATE 50 MCG: 50 INJECTION INTRAMUSCULAR; INTRAVENOUS at 07:46

## 2020-11-12 RX ADMIN — KETOROLAC TROMETHAMINE 30 MG: 30 INJECTION, SOLUTION INTRAMUSCULAR; INTRAVENOUS at 08:29

## 2020-11-12 RX ADMIN — FENTANYL CITRATE 25 MCG: 50 INJECTION INTRAMUSCULAR; INTRAVENOUS at 08:42

## 2020-11-12 RX ADMIN — DEXAMETHASONE SODIUM PHOSPHATE 10 MG: 10 INJECTION, SOLUTION INTRAMUSCULAR; INTRAVENOUS at 07:51

## 2020-11-12 RX ADMIN — HYDROCODONE BITARTRATE AND ACETAMINOPHEN 1 TABLET: 5; 325 TABLET ORAL at 09:52

## 2020-11-12 RX ADMIN — PHENYLEPHRINE HYDROCHLORIDE 100 MCG: 10 INJECTION INTRAVENOUS at 08:00

## 2020-11-12 RX ADMIN — FENTANYL CITRATE 50 MCG: 50 INJECTION INTRAMUSCULAR; INTRAVENOUS at 08:47

## 2020-11-12 RX ADMIN — GLYCOPYRROLATE 0.4 MG: 0.2 INJECTION, SOLUTION INTRAMUSCULAR; INTRAVENOUS at 08:31

## 2020-11-12 RX ADMIN — FENTANYL CITRATE 50 MCG: 50 INJECTION INTRAMUSCULAR; INTRAVENOUS at 08:01

## 2020-11-19 ENCOUNTER — TELEPHONE (OUTPATIENT)
Dept: SURGERY | Facility: CLINIC | Age: 30
End: 2020-11-19

## 2020-11-21 ENCOUNTER — NURSE TRIAGE (OUTPATIENT)
Dept: OTHER | Facility: OTHER | Age: 30
End: 2020-11-21

## 2020-11-25 ENCOUNTER — OFFICE VISIT (OUTPATIENT)
Dept: SURGERY | Facility: CLINIC | Age: 30
End: 2020-11-25

## 2020-11-25 DIAGNOSIS — K80.10 CALCULUS OF GALLBLADDER WITH CHRONIC CHOLECYSTITIS WITHOUT OBSTRUCTION: Primary | ICD-10-CM

## 2020-11-25 PROCEDURE — 99024 POSTOP FOLLOW-UP VISIT: CPT | Performed by: PHYSICIAN ASSISTANT

## 2020-12-23 ENCOUNTER — TELEPHONE (OUTPATIENT)
Dept: SURGERY | Facility: CLINIC | Age: 30
End: 2020-12-23

## 2020-12-23 ENCOUNTER — PROCEDURE VISIT (OUTPATIENT)
Dept: SURGERY | Facility: CLINIC | Age: 30
End: 2020-12-23

## 2020-12-23 DIAGNOSIS — K21.9 GASTROESOPHAGEAL REFLUX DISEASE WITHOUT ESOPHAGITIS: Primary | ICD-10-CM

## 2020-12-23 DIAGNOSIS — R10.13 EPIGASTRIC ABDOMINAL PAIN: ICD-10-CM

## 2020-12-23 PROCEDURE — 99024 POSTOP FOLLOW-UP VISIT: CPT | Performed by: SURGERY

## 2020-12-30 ENCOUNTER — TELEPHONE (OUTPATIENT)
Dept: GASTROENTEROLOGY | Facility: CLINIC | Age: 30
End: 2020-12-30

## 2021-01-04 ENCOUNTER — TELEPHONE (OUTPATIENT)
Dept: SURGERY | Facility: CLINIC | Age: 31
End: 2021-01-04

## 2021-01-04 ENCOUNTER — TELEPHONE (OUTPATIENT)
Dept: GASTROENTEROLOGY | Facility: CLINIC | Age: 31
End: 2021-01-04

## 2021-01-04 NOTE — TELEPHONE ENCOUNTER
Patient called, her EGD with Dr Rochelle Morales is to be canceled, she will be having it done at a later date by Dr Petty Marine

## 2021-01-04 NOTE — TELEPHONE ENCOUNTER
Patients GI provider:  Dr Chavez Lot    Number to return call: [phone]    Reason for call: Pt calling to sched egd procedure    Scheduled procedure/appointment date if applicable: NA

## 2021-01-07 ENCOUNTER — PREP FOR PROCEDURE (OUTPATIENT)
Dept: GASTROENTEROLOGY | Facility: MEDICAL CENTER | Age: 31
End: 2021-01-07

## 2021-01-07 DIAGNOSIS — R10.13 EPIGASTRIC ABDOMINAL PAIN: Primary | ICD-10-CM

## 2021-01-07 NOTE — TELEPHONE ENCOUNTER
Pt is scheduled for an egd with dr Kevin Jules on 2/19/2021 at , I went over instructions with pt on phone  Patient is aware she will need a  to and from   She will get a call the day before with an exact time for arrival

## 2021-02-17 ENCOUNTER — TELEPHONE (OUTPATIENT)
Dept: GASTROENTEROLOGY | Facility: AMBULARY SURGERY CENTER | Age: 31
End: 2021-02-17
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
## 2021-03-23 NOTE — TELEPHONE ENCOUNTER
Pt called to reschedule stating her insurance begins on may 1st  I moved pt to 5/10 at  with dr Byron Madrigal

## 2021-05-10 ENCOUNTER — ANESTHESIA (OUTPATIENT)
Dept: GASTROENTEROLOGY | Facility: HOSPITAL | Age: 31
End: 2021-05-10

## 2021-05-10 ENCOUNTER — ANESTHESIA EVENT (OUTPATIENT)
Dept: GASTROENTEROLOGY | Facility: HOSPITAL | Age: 31
End: 2021-05-10

## 2021-05-10 ENCOUNTER — HOSPITAL ENCOUNTER (OUTPATIENT)
Dept: GASTROENTEROLOGY | Facility: HOSPITAL | Age: 31
Setting detail: OUTPATIENT SURGERY
Discharge: HOME | End: 2021-05-10
Attending: INTERNAL MEDICINE | Admitting: INTERNAL MEDICINE
Payer: COMMERCIAL

## 2021-05-10 VITALS
BODY MASS INDEX: 39.53 KG/M2 | TEMPERATURE: 98.5 F | SYSTOLIC BLOOD PRESSURE: 92 MMHG | RESPIRATION RATE: 16 BRPM | WEIGHT: 246 LBS | HEIGHT: 66 IN | HEART RATE: 69 BPM | DIASTOLIC BLOOD PRESSURE: 56 MMHG | OXYGEN SATURATION: 96 %

## 2021-05-10 DIAGNOSIS — R10.13 EPIGASTRIC ABDOMINAL PAIN: ICD-10-CM

## 2021-05-10 DIAGNOSIS — K22.10 EROSIVE ESOPHAGITIS: Primary | ICD-10-CM

## 2021-05-10 LAB
EXT PREGNANCY TEST URINE: NEGATIVE
EXT. CONTROL: NORMAL

## 2021-05-10 PROCEDURE — 43239 EGD BIOPSY SINGLE/MULTIPLE: CPT | Performed by: INTERNAL MEDICINE

## 2021-05-10 PROCEDURE — 88305 TISSUE EXAM BY PATHOLOGIST: CPT | Performed by: PATHOLOGY

## 2021-05-10 PROCEDURE — 81025 URINE PREGNANCY TEST: CPT | Performed by: INTERNAL MEDICINE

## 2021-05-10 RX ORDER — SODIUM CHLORIDE, SODIUM LACTATE, POTASSIUM CHLORIDE, CALCIUM CHLORIDE 600; 310; 30; 20 MG/100ML; MG/100ML; MG/100ML; MG/100ML
100 INJECTION, SOLUTION INTRAVENOUS CONTINUOUS
Status: DISCONTINUED | OUTPATIENT
Start: 2021-05-10 | End: 2021-05-14 | Stop reason: HOSPADM

## 2021-05-10 RX ORDER — PROPOFOL 10 MG/ML
INJECTION, EMULSION INTRAVENOUS AS NEEDED
Status: DISCONTINUED | OUTPATIENT
Start: 2021-05-10 | End: 2021-05-10

## 2021-05-10 RX ORDER — OMEPRAZOLE 40 MG/1
40 CAPSULE, DELAYED RELEASE ORAL DAILY
Qty: 30 CAPSULE | Refills: 5 | Status: SHIPPED | OUTPATIENT
Start: 2021-05-10 | End: 2021-08-16 | Stop reason: SDUPTHER

## 2021-05-10 RX ADMIN — PROPOFOL 100 MG: 10 INJECTION, EMULSION INTRAVENOUS at 11:53

## 2021-05-10 RX ADMIN — LIDOCAINE HYDROCHLORIDE 15 ML: 20 SOLUTION ORAL; TOPICAL at 10:50

## 2021-05-10 RX ADMIN — PROPOFOL 50 MG: 10 INJECTION, EMULSION INTRAVENOUS at 11:54

## 2021-05-10 RX ADMIN — SODIUM CHLORIDE, SODIUM LACTATE, POTASSIUM CHLORIDE, AND CALCIUM CHLORIDE 100 ML/HR: .6; .31; .03; .02 INJECTION, SOLUTION INTRAVENOUS at 11:10

## 2021-05-10 RX ADMIN — PROPOFOL 50 MG: 10 INJECTION, EMULSION INTRAVENOUS at 11:56

## 2021-05-10 RX ADMIN — PROPOFOL 50 MG: 10 INJECTION, EMULSION INTRAVENOUS at 11:58

## 2021-05-10 NOTE — ANESTHESIA PREPROCEDURE EVALUATION
Procedure:  EGD    Relevant Problems   ANESTHESIA (within normal limits)      GI/HEPATIC   (+) Gastroesophageal reflux disease      HEMATOLOGY   (+) Iron deficiency anemia      MUSCULOSKELETAL   (+) Neck muscle spasm      NEURO/PSYCH   (+) Chronic neck pain   (+) Postpartum depression        Physical Exam    Airway    Mallampati score: II  TM Distance: >3 FB  Neck ROM: full     Dental   No notable dental hx     Cardiovascular      Pulmonary      Other Findings        Anesthesia Plan  ASA Score- 2     Anesthesia Type- IV sedation with anesthesia with ASA Monitors  Additional Monitors:   Airway Plan:     Comment: Per patient, appropriately NPO, denies active CP/SOB/wheezing/symptoms related to heartburn/nausea/vomiting  Plan Factors-Exercise tolerance (METS): >4 METS  Chart reviewed  Patient summary reviewed  Patient is not a current smoker  Induction- intravenous  Postoperative Plan-     Informed Consent- Anesthetic plan and risks discussed with patient  I personally reviewed this patient with the CRNA  Discussed and agreed on the Anesthesia Plan with the CRNA  Cortney Brian

## 2021-05-10 NOTE — H&P
History and Physical - [facility]  Saima Saba 27 y o  female MRN: [id number]                  HPI: Saima Saba is a 27y o  year old female who presents for EGD  I have patient was seen 2020 for attacks of epigastric abdominal pain  She then had a cholecystectomy for what we felt to be symptomatic cholelithiasis  This was performed on 2020  She did have a recurrent episode of pain a following her cholecystectomy  This was similar to the episodes that she had prior cholecystectomy  She has not had an episode like this since then  She does report though a dull ache in the right upper quadrant and left upper quadrant fairly high up in the abdomen  She does report some bloating  She reports frequent heartburn despite omeprazole 20 mg  She is taking her omeprazole inconsistently  She denies any dysphagia nausea vomiting or unintentional weight loss  She does use Motrin 600 mg 3 times per week  She takes a lot of antacids  Does report occasional nausea but no vomiting  She does drink about 6 cups of coffee per day  Bowel habits are fairly regular denies any diarrhea, constipation, bleeding or black stools  She does not smoke tobacco and does not drink alcohol  REVIEW OF SYSTEMS: Per the HPI, and otherwise unremarkable      Historical Information   Past Medical History:   Diagnosis Date    Anemia     Post partum depression     Rash of genital area     started a few weeks ago, red     Past Surgical History:   Procedure Laterality Date    APPENDECTOMY       SECTION      [place]  DELIVERY ONLY N/A 2019    Procedure:  SECTION () REPEAT;  Surgeon: Aria Chaudhary MD;  [facility];  Service: Obstetrics    [place] LAP,CHOLECYSTECTOMY/GRAPH N/A 2020    Procedure: LAPAROSCOPIC CHOLECYSTECTOMY WITH INTRAOP CHOLANGIOGRAM;  Surgeon: Delores Archibald MD;  Location: [address] [place] [address] OR; Service: General     Social History   Social History     Substance and Sexual Activity   Alcohol Use No     Social History     Substance and Sexual Activity   Drug Use Not Currently     Social History     Tobacco Use   Smoking Status Former Smoker    Quit date: 7/14/2017    Years since quitting: 3 8   Smokeless Tobacco Never Used     Family History   Problem Relation Age of Onset    Dinosaur's disease Family     Dinosaur's disease Mother     No Known Problems Father     No Known Problems Sister     No Known Problems Brother        Meds/Allergies       Current Outpatient Medications:     ibuprofen (MOTRIN) 600 mg tablet    omeprazole (PriLOSEC) 20 mg delayed release capsule    Current Facility-Administered Medications:     lactated ringers infusion, 100 mL/hr, Intravenous, Continuous, Continue from Pre-op at 05/10/21 1112    No Known Allergies    Objective     /65   Pulse 86   Temp 98 5 °F (36 9 °C) (Temporal)   Resp 18   Ht 5' 6" (1 676 m)   Wt 112 kg (246 lb)   SpO2 98%   BMI 39 71 kg/m²       PHYSICAL EXAM    Gen: NAD  Head: NCAT  CV: RRR  CHEST: Clear  ABD: soft, NT/ND  EXT: no edema      ASSESSMENT/PLAN:  This is a 27y o  year old female here for EGD, and she is stable and optimized for her procedure  Depending upon endoscopy results, I may be increasing her omeprazole dose or adding famotidine  We did talk about cutting back on caffeine  I did advise her to stop using NSAIDs  I also advised her to take her omeprazole 30 minutes to 1 hour before 1 meal per day and she should take this daily

## 2021-05-14 RX ORDER — LIDOCAINE HYDROCHLORIDE 20 MG/ML
SOLUTION OROPHARYNGEAL CODE/TRAUMA/SEDATION MEDICATION
Status: COMPLETED | OUTPATIENT
Start: 2021-05-10 | End: 2021-05-10

## 2021-05-18 ENCOUNTER — PREP FOR PROCEDURE (OUTPATIENT)
Dept: GASTROENTEROLOGY | Facility: CLINIC | Age: 31
End: 2021-05-18

## 2021-05-18 DIAGNOSIS — K22.10 EROSIVE ESOPHAGITIS: Primary | ICD-10-CM

## 2021-06-01 ENCOUNTER — OFFICE VISIT (OUTPATIENT)
Dept: URGENT CARE | Facility: CLINIC | Age: 31
End: 2021-06-01
Payer: COMMERCIAL

## 2021-06-01 ENCOUNTER — APPOINTMENT (OUTPATIENT)
Dept: RADIOLOGY | Facility: CLINIC | Age: 31
End: 2021-06-01
Payer: COMMERCIAL

## 2021-06-01 VITALS
DIASTOLIC BLOOD PRESSURE: 59 MMHG | HEART RATE: 92 BPM | TEMPERATURE: 98.3 F | OXYGEN SATURATION: 100 % | RESPIRATION RATE: 18 BRPM | SYSTOLIC BLOOD PRESSURE: 116 MMHG

## 2021-06-01 DIAGNOSIS — M54.50 ACUTE MIDLINE LOW BACK PAIN WITHOUT SCIATICA: Primary | ICD-10-CM

## 2021-06-01 DIAGNOSIS — M54.50 ACUTE MIDLINE LOW BACK PAIN WITHOUT SCIATICA: ICD-10-CM

## 2021-06-01 PROCEDURE — 96372 THER/PROPH/DIAG INJ SC/IM: CPT | Performed by: PHYSICIAN ASSISTANT

## 2021-06-01 PROCEDURE — 72100 X-RAY EXAM L-S SPINE 2/3 VWS: CPT

## 2021-06-01 PROCEDURE — 99213 OFFICE O/P EST LOW 20 MIN: CPT | Performed by: PHYSICIAN ASSISTANT

## 2021-06-01 RX ORDER — CYCLOBENZAPRINE HCL 10 MG
10 TABLET ORAL 3 TIMES DAILY PRN
Qty: 30 TABLET | Refills: 0 | Status: SHIPPED | OUTPATIENT
Start: 2021-06-01

## 2021-06-01 RX ORDER — KETOROLAC TROMETHAMINE 30 MG/ML
30 INJECTION, SOLUTION INTRAMUSCULAR; INTRAVENOUS ONCE
Status: COMPLETED | OUTPATIENT
Start: 2021-06-01 | End: 2021-06-01

## 2021-06-01 RX ORDER — CYCLOBENZAPRINE HCL 10 MG
10 TABLET ORAL ONCE
Status: COMPLETED | OUTPATIENT
Start: 2021-06-01 | End: 2021-06-01

## 2021-06-01 RX ORDER — PREDNISONE 10 MG/1
TABLET ORAL
Qty: 26 TABLET | Refills: 0 | Status: SHIPPED | OUTPATIENT
Start: 2021-06-01 | End: 2021-06-23 | Stop reason: ALTCHOICE

## 2021-06-01 RX ADMIN — KETOROLAC TROMETHAMINE 30 MG: 30 INJECTION, SOLUTION INTRAMUSCULAR; INTRAVENOUS at 14:02

## 2021-06-01 RX ADMIN — Medication 10 MG: at 14:00

## 2021-06-01 NOTE — LETTER
June 1, 2021     Patient: Leyla Kelly   YOB: 1990   Date of Visit: 6/1/2021       To Whom It May Concern: It is my medical opinion that Anna Cano may return to work on 6/4/2021  If you have any questions or concerns, please don't hesitate to call           Sincerely,        Rachel Coronado PA-C    CC: No Recipients

## 2021-06-01 NOTE — PATIENT INSTRUCTIONS
Start prednisone to help reduce inflammation  Take as directed  Muscle relaxer as needed  Do not take muscle relaxer if you're going to be driving and do not take with alcohol  Apply ice to the area 3-4 times daily for 20-30 minutes  After 2-3 days can start using moist heat a few times a day and do gentle stretches  If not improving over the next week, follow up with PCP or orthopedics

## 2021-06-01 NOTE — PROGRESS NOTES
[facility]    NAME: Regan Bello is a 32 y o  female  : 1990    MRN: [id number]  DATE: 2021  TIME: 2:03 PM    Assessment and Plan   Acute midline low back pain without sciatica [M54 5]  1  Acute midline low back pain without sciatica  ketorolac (TORADOL) injection 30 mg    cyclobenzaprine (FLEXERIL) tablet 10 mg    XR spine lumbar 2 or 3 views injury       Patient Instructions   There are no Patient Instructions on file for this visit  Chief Complaint     Chief Complaint   Patient presents with    Back Pain     Pt c/o back pain after lifting her daughter up  History of Present Illness     77-year-old female here with complaint low back pain after lifting her child from the car seat to [facility]  States that she had sudden onset of pain back  Pain will radiate into bilateral thighs  No related numbness or tingling  No weakness  Denies bladder symptoms  Review of Systems   Review of Systems   Constitutional: Negative for fatigue  HENT: Negative for congestion  Respiratory: Negative for cough and shortness of breath  Cardiovascular: Negative for chest pain, palpitations and leg swelling  Gastrointestinal: Negative for abdominal pain  Musculoskeletal: Positive for back pain  Negative for gait problem and joint swelling  Neurological: Negative for weakness and numbness  Current Medications     Current Outpatient Medications:     ibuprofen (MOTRIN) 600 mg tablet, Take 1 tablet (600 mg total) by mouth every 6 (six) hours as needed (cramping), Disp: 30 tablet, Rfl: 0    omeprazole (PriLOSEC) 40 MG capsule, Take 1 capsule (40 mg total) by mouth daily Take 30 minutes to 1 hour prior to 1 meal a day , Disp: 30 capsule, Rfl: 5  No current facility-administered medications for this visit       Current Allergies     Allergies as of 2021    (No Known Allergies)          The following portions of the patient's history were reviewed and updated as appropriate: allergies, current medications, past family history, past medical history, past social history, past surgical history and problem list    Past Medical History:   Diagnosis Date    Anemia     Post partum depression     Rash of genital area     started a few weeks ago, red     Past Surgical History:   Procedure Laterality Date    APPENDECTOMY       SECTION  2013    UT  DELIVERY ONLY N/A 2019    Procedure:  SECTION () REPEAT;  Surgeon: Hellen Miller MD;  Location: [facility];  Service: Obstetrics    [place] LAP,CHOLECYSTECTOMY/GRAPH N/A 2020    Procedure: LAPAROSCOPIC CHOLECYSTECTOMY WITH INTRAOP CHOLANGIOGRAM;  Surgeon: Lobo Haynes MD;  Location: [address] [place] [address] OR;  Service: General     Family History   Problem Relation Age of Onset    DeSoto's disease Family     DeSoto's disease Mother     No Known Problems Father     No Known Problems Sister     No Known Problems Brother      Social History     Socioeconomic History    Marital status: Single     Spouse name: Not on file    Number of children: Not on file    Years of education: Not on file    Highest education level: Not on file   Occupational History    Not on file   Social Needs    Financial resource strain: Not on file    Food insecurity     Worry: Not on file     Inability: Not on file    Transportation needs     Medical: Not on file     Non-medical: Not on file   Tobacco Use    Smoking status: Former Smoker     Quit date: 2017     Years since quitting: 3 8    Smokeless tobacco: Never Used   Substance and Sexual Activity    Alcohol use: No    Drug use: Not Currently    Sexual activity: Yes   Lifestyle    Physical activity     Days per week: Not on file     Minutes per session: Not on file    Stress: Not on file   Relationships    Social connections     Talks on phone: Not on file     Gets together: Not on file     Attends Restorationist service: Not on file     Active member of club or organization: Not on file     Attends meetings of clubs or organizations: Not on file     Relationship status: Not on file    Intimate partner violence     Fear of current or ex partner: Not on file     Emotionally abused: Not on file     Physically abused: Not on file     Forced sexual activity: Not on file   Other Topics Concern    Not on file   Social History Narrative    Not on file     Medications have been verified  Objective   /59   Pulse 92   Temp 98 3 °F (36 8 °C)   Resp 18   SpO2 100%      Physical Exam   Physical Exam  Vitals signs and nursing note reviewed  Constitutional:       Appearance: Normal appearance  HENT:      Head: Normocephalic and atraumatic  Right Ear: Tympanic membrane normal       Left Ear: Tympanic membrane normal    Neck:      Musculoskeletal: Normal range of motion  Cardiovascular:      Rate and Rhythm: Normal rate and regular rhythm  Pulmonary:      Effort: Pulmonary effort is normal  No respiratory distress  Abdominal:      General: Bowel sounds are normal    Musculoskeletal:      Lumbar back: She exhibits decreased range of motion, tenderness, pain and spasm  She exhibits no swelling and no laceration  Neurological:      Mental Status: She is alert

## 2021-06-23 ENCOUNTER — TELEMEDICINE (OUTPATIENT)
Dept: FAMILY MEDICINE CLINIC | Facility: CLINIC | Age: 31
End: 2021-06-23
Payer: COMMERCIAL

## 2021-06-23 DIAGNOSIS — Z82.0 FAMILY HISTORY OF HUNTINGTON'S DISEASE: ICD-10-CM

## 2021-06-23 DIAGNOSIS — Z13.29 SCREENING FOR ENDOCRINE, METABOLIC AND IMMUNITY DISORDER: ICD-10-CM

## 2021-06-23 DIAGNOSIS — Z13.228 SCREENING FOR ENDOCRINE, METABOLIC AND IMMUNITY DISORDER: ICD-10-CM

## 2021-06-23 DIAGNOSIS — M54.2 NECK PAIN ON RIGHT SIDE: Primary | ICD-10-CM

## 2021-06-23 DIAGNOSIS — Z13.0 SCREENING FOR ENDOCRINE, METABOLIC AND IMMUNITY DISORDER: ICD-10-CM

## 2021-06-23 DIAGNOSIS — Z13.220 SCREENING FOR LIPID DISORDERS: ICD-10-CM

## 2021-06-23 DIAGNOSIS — Z12.4 SCREENING FOR CERVICAL CANCER: ICD-10-CM

## 2021-06-23 PROBLEM — K80.10 CALCULUS OF GALLBLADDER WITH CHRONIC CHOLECYSTITIS WITHOUT OBSTRUCTION: Status: RESOLVED | Noted: 2020-07-22 | Resolved: 2021-06-23

## 2021-06-23 PROBLEM — O99.210 MATERNAL MORBID OBESITY, ANTEPARTUM (HCC): Status: RESOLVED | Noted: 2019-07-19 | Resolved: 2021-06-23

## 2021-06-23 PROBLEM — E66.01 MATERNAL MORBID OBESITY, ANTEPARTUM (HCC): Status: RESOLVED | Noted: 2019-07-19 | Resolved: 2021-06-23

## 2021-06-23 PROBLEM — R39.15 URINARY URGENCY: Status: RESOLVED | Noted: 2019-10-15 | Resolved: 2021-06-23

## 2021-06-23 PROCEDURE — G0071 COMM SVCS BY RHC/FQHC 5 MIN: HCPCS | Performed by: FAMILY MEDICINE

## 2021-06-23 NOTE — PROGRESS NOTES
Virtual Brief Visit    Assessment/Plan:    Problem List Items Addressed This Visit        Other    Family history of Blair's disease    Relevant Orders    Ambulatory referral to Genetics      Other Visit Diagnoses     Neck pain on right side    -  Primary    Screening for cervical cancer        Relevant Orders    Ambulatory referral to Gynecology    Screening for lipid disorders        Relevant Orders    Lipid panel    Screening for endocrine, metabolic and immunity disorder        Relevant Orders    CBC and differential    Comprehensive metabolic panel    TSH, 3rd generation with Free T4 reflex        - We discussed that her symptoms suggest a cervical muscle strain, however, patient does not feel this is the case  She feels as though there is a mass in her neck  Due to the virtual platform of this visit I was not able to physically assess this  She was advised to obtain routine labs as ordered which will include CBC and TSH and schedule an in-office follow up for further evaluation   - Referral provided to genetics for counseling regarding reported family history of Greg's  Reason for visit is   Chief Complaint   Patient presents with    Neck Pain     right sided    Virtual Brief Visit        Encounter provider Dwayne Lopez PA-C    Provider located at [address] [place] [address]    Recent Visits  No visits were found meeting these conditions  Showing recent visits within past 7 days and meeting all other requirements  Today's Visits  Date Type Provider Dept   06/23/21 Telemedicine LOIS Brooks  Jacqueline   Showing today's visits and meeting all other requirements  Future Appointments  No visits were found meeting these conditions    Showing future appointments within next 150 days and meeting all other requirements       After connecting through telephone, the patient was identified by name and date of birth  Rodrigo Sultana was informed that this is a telemedicine visit and that the visit is being conducted through telephone  My office door was closed  No one else was in the room  She acknowledged consent and understanding of privacy and security of the platform  The patient has agreed to participate and understands she can discontinue the visit at any time  It was my intent to perform this visit via video technology but the patient was not able to do a video connection so the visit was completed via audio telephone only  Patient is aware this is a billable service  Subjective    Andree Vickers is a 32 y o  female  Nila Lau is a 32year old female with history of GERD, presenting with concern for neck pain  Patient reports that over the past few months she has had intermittent right sided neck pain  She states she feels as though there is a lump in her neck but she has not been able to palpate any masses  Pain is worse when lifting her arm and bending her chin to her shoulder  States the pain is not excruciating but is more of a discomfort  Notes that she does hold a phone on her right side daily for work  Denies injury to her neck  She denies other URI symptoms including fevers, ear pain, rhinorrhea, sinus pressure, or sore throat  She has not taken anything specifically for this but notes she does take flexeril and ibuprofen intermittently for chronic back pain  Patient is also requesting genetic testing as she notes a family history of dallin's disease  She has never seen a genetics counselor         Past Medical History:   Diagnosis Date    Anemia     Post partum depression     Rash of genital area     started a few weeks ago, red       Past Surgical History:   Procedure Laterality Date    APPENDECTOMY       SECTION      [place]  DELIVERY ONLY N/A 2019    Procedure:  SECTION () REPEAT;  Surgeon: Alecia Low Dariela Wheatley MD;  Location[facility];  Service: Obstetrics    [place] LAP,CHOLECYSTECTOMY/GRAPH N/A 11/12/2020    Procedure: LAPAROSCOPIC CHOLECYSTECTOMY WITH INTRAOP CHOLANGIOGRAM;  Surgeon: Molina Bonds MD;  Location: [facility] MAIN OR;  Service: General       Current Outpatient Medications   Medication Sig Dispense Refill    ibuprofen (MOTRIN) 600 mg tablet Take 1 tablet (600 mg total) by mouth every 6 (six) hours as needed (cramping) 30 tablet 0    cyclobenzaprine (FLEXERIL) 10 mg tablet Take 1 tablet (10 mg total) by mouth 3 (three) times a day as needed for muscle spasms (Patient not taking: Reported on 6/23/2021) 30 tablet 0    omeprazole (PriLOSEC) 40 MG capsule Take 1 capsule (40 mg total) by mouth daily Take 30 minutes to 1 hour prior to 1 meal a day  30 capsule 5     No current facility-administered medications for this visit  No Known Allergies    Review of Systems   Constitutional: Negative for chills, diaphoresis and fever  HENT: Negative for congestion, ear pain, postnasal drip, rhinorrhea, sinus pressure and sore throat  Respiratory: Negative for cough, chest tightness, shortness of breath and wheezing  Cardiovascular: Negative for chest pain, palpitations and leg swelling  Gastrointestinal: Negative for abdominal pain, blood in stool, constipation, diarrhea, nausea and vomiting  Musculoskeletal: Positive for neck pain  Negative for neck stiffness  Skin: Negative for rash and wound  Neurological: Negative for dizziness, syncope, weakness, light-headedness and headaches  There were no vitals filed for this visit  I spent 15 minutes directly with the patient during this visit    [address] acknowledges that she has consented to an online visit or consultation   She understands that the online visit is based solely on information provided by her, and that, in the absence of a face-to-face physical evaluation by the physician, the diagnosis she receives is both limited and provisional in terms of accuracy and completeness  This is not intended to replace a full medical face-to-face evaluation by the physician  Shaunna Redmond understands and accepts these terms

## 2021-08-03 ENCOUNTER — TELEPHONE (OUTPATIENT)
Dept: GASTROENTEROLOGY | Facility: CLINIC | Age: 31
End: 2021-08-03

## 2021-08-03 NOTE — TELEPHONE ENCOUNTER
Placed call to patient and left a message to return our call in regards to her appointment next week  Need to inquire if patient is able to get her lab work done prior to the appointment, we can fax the slips to her lab of choice to ensure she is able to get it in time

## 2021-08-10 ENCOUNTER — TELEPHONE (OUTPATIENT)
Dept: HEMATOLOGY ONCOLOGY | Facility: CLINIC | Age: 31
End: 2021-08-10

## 2021-08-10 NOTE — TELEPHONE ENCOUNTER
New Patient Request   Patient Details:     Jalen Dougherty      1990      [id number]      Reason for Appointment   Who is calling to schedule? Patient    If not Patient, what is their name? What is the diagnosis? Family history of Greg's disease   Who is the referring doctor? Shannon De Leon   Scheduling Information   Which department are you scheduling with ? Genetics   Preferred [facility]   Best Number to call back on? If calling from the [facility], use the Nurse number   [phone]   Miscellaneous Information:     Please advise the patient, a new patient  will be calling them back within 1 business day

## 2021-08-12 NOTE — TELEPHONE ENCOUNTER
Checked with our genetic counselor as to whether we consult on dallin's disease and we do not  I called pt back and made her aware and she voiced understanding

## 2021-08-13 ENCOUNTER — TELEPHONE (OUTPATIENT)
Dept: SURGERY | Facility: HOSPITAL | Age: 31
End: 2021-08-13

## 2021-08-16 ENCOUNTER — ANESTHESIA EVENT (OUTPATIENT)
Dept: GASTROENTEROLOGY | Facility: HOSPITAL | Age: 31
End: 2021-08-16

## 2021-08-16 ENCOUNTER — HOSPITAL ENCOUNTER (OUTPATIENT)
Dept: GASTROENTEROLOGY | Facility: HOSPITAL | Age: 31
Setting detail: OUTPATIENT SURGERY
Discharge: HOME | End: 2021-08-16
Attending: INTERNAL MEDICINE | Admitting: INTERNAL MEDICINE
Payer: COMMERCIAL

## 2021-08-16 ENCOUNTER — ANESTHESIA (OUTPATIENT)
Dept: GASTROENTEROLOGY | Facility: HOSPITAL | Age: 31
End: 2021-08-16

## 2021-08-16 VITALS
RESPIRATION RATE: 16 BRPM | HEART RATE: 67 BPM | SYSTOLIC BLOOD PRESSURE: 91 MMHG | DIASTOLIC BLOOD PRESSURE: 54 MMHG | TEMPERATURE: 97.1 F | BODY MASS INDEX: 39.53 KG/M2 | HEIGHT: 66 IN | WEIGHT: 246 LBS | OXYGEN SATURATION: 98 %

## 2021-08-16 DIAGNOSIS — K22.10 EROSIVE ESOPHAGITIS: ICD-10-CM

## 2021-08-16 LAB
EXT PREGNANCY TEST URINE: NEGATIVE
EXT. CONTROL: NORMAL

## 2021-08-16 PROCEDURE — 88305 TISSUE EXAM BY PATHOLOGIST: CPT | Performed by: PATHOLOGY

## 2021-08-16 PROCEDURE — 81025 URINE PREGNANCY TEST: CPT | Performed by: ANESTHESIOLOGY

## 2021-08-16 PROCEDURE — 43239 EGD BIOPSY SINGLE/MULTIPLE: CPT | Performed by: INTERNAL MEDICINE

## 2021-08-16 RX ORDER — LIDOCAINE HYDROCHLORIDE 20 MG/ML
INJECTION, SOLUTION EPIDURAL; INFILTRATION; INTRACAUDAL; PERINEURAL AS NEEDED
Status: DISCONTINUED | OUTPATIENT
Start: 2021-08-16 | End: 2021-08-16

## 2021-08-16 RX ORDER — LIDOCAINE HYDROCHLORIDE 10 MG/ML
0.5 INJECTION, SOLUTION EPIDURAL; INFILTRATION; INTRACAUDAL; PERINEURAL ONCE AS NEEDED
Status: DISCONTINUED | OUTPATIENT
Start: 2021-08-16 | End: 2021-08-20 | Stop reason: HOSPADM

## 2021-08-16 RX ORDER — LIDOCAINE HYDROCHLORIDE 20 MG/ML
SOLUTION OROPHARYNGEAL CODE/TRAUMA/SEDATION MEDICATION
Status: COMPLETED | OUTPATIENT
Start: 2021-08-16 | End: 2021-08-16

## 2021-08-16 RX ORDER — SODIUM CHLORIDE, SODIUM LACTATE, POTASSIUM CHLORIDE, CALCIUM CHLORIDE 600; 310; 30; 20 MG/100ML; MG/100ML; MG/100ML; MG/100ML
50 INJECTION, SOLUTION INTRAVENOUS CONTINUOUS
Status: DISCONTINUED | OUTPATIENT
Start: 2021-08-16 | End: 2021-08-20 | Stop reason: HOSPADM

## 2021-08-16 RX ORDER — GLYCOPYRROLATE 0.2 MG/ML
INJECTION INTRAMUSCULAR; INTRAVENOUS AS NEEDED
Status: DISCONTINUED | OUTPATIENT
Start: 2021-08-16 | End: 2021-08-16

## 2021-08-16 RX ORDER — PROPOFOL 10 MG/ML
INJECTION, EMULSION INTRAVENOUS AS NEEDED
Status: DISCONTINUED | OUTPATIENT
Start: 2021-08-16 | End: 2021-08-16

## 2021-08-16 RX ORDER — OMEPRAZOLE 40 MG/1
40 CAPSULE, DELAYED RELEASE ORAL
Qty: 60 CAPSULE | Refills: 5 | Status: SHIPPED | OUTPATIENT
Start: 2021-08-16 | End: 2022-08-09 | Stop reason: SDUPTHER

## 2021-08-16 RX ORDER — LIDOCAINE HYDROCHLORIDE 20 MG/ML
15 SOLUTION OROPHARYNGEAL ONCE
Status: DISCONTINUED | OUTPATIENT
Start: 2021-08-16 | End: 2021-08-20 | Stop reason: HOSPADM

## 2021-08-16 RX ADMIN — PROPOFOL 50 MG: 10 INJECTION, EMULSION INTRAVENOUS at 09:28

## 2021-08-16 RX ADMIN — PROPOFOL 50 MG: 10 INJECTION, EMULSION INTRAVENOUS at 09:25

## 2021-08-16 RX ADMIN — PROPOFOL 50 MG: 10 INJECTION, EMULSION INTRAVENOUS at 09:20

## 2021-08-16 RX ADMIN — PROPOFOL 100 MG: 10 INJECTION, EMULSION INTRAVENOUS at 09:19

## 2021-08-16 RX ADMIN — LIDOCAINE HYDROCHLORIDE 15 ML: 20 SOLUTION ORAL; TOPICAL at 09:15

## 2021-08-16 RX ADMIN — SODIUM CHLORIDE, SODIUM LACTATE, POTASSIUM CHLORIDE, AND CALCIUM CHLORIDE 50 ML/HR: .6; .31; .03; .02 INJECTION, SOLUTION INTRAVENOUS at 08:51

## 2021-08-16 RX ADMIN — LIDOCAINE HYDROCHLORIDE 100 MG: 20 INJECTION, SOLUTION EPIDURAL; INFILTRATION; INTRACAUDAL; PERINEURAL at 09:19

## 2021-08-16 RX ADMIN — GLYCOPYRROLATE 0.2 MG: 0.2 INJECTION, SOLUTION INTRAMUSCULAR; INTRAVENOUS at 09:11

## 2021-08-16 RX ADMIN — PROPOFOL 50 MG: 10 INJECTION, EMULSION INTRAVENOUS at 09:22

## 2021-08-16 NOTE — H&P
History and Physical - [facility]  Marty Ann 32 y o  female MRN: [id number]                  HPI: Marty Ann is a 32y o  year old female who presents for EGD to follow-up on LA grade C erosive esophagitis  She had an upper endoscopy performed back May 10, 2021  Biopsy duodenum negative for celiac disease antral erythema biopsy negative for H pylori  She had a 2-3 cm sliding hiatal hernia and grade C erosive esophagitis along with irregular Z-line  The esophagus was not biopsy  She was placed on omeprazole 40 mg daily  She did find some improvement in the reflux symptoms  However she still experiences a fiery feeling in the epigastric region 1 to 2 times a month for which she will use an antacid  She does have nausea when this occurs  There has been no vomiting  She is no longer experiencing the significant epigastric pain should soon after gallbladder removed  Denies any dysphagia or early satiety there has been no unintentional weight loss  Bowel habits are very regular denies any diarrhea constipation bleeding or black stools  She does not smoke tobacco   She may have occasional alcoholic beverage  There is no family history colon cancer colon polyps  REVIEW OF SYSTEMS: Per the HPI, and otherwise unremarkable      Historical Information   Past Medical History:   Diagnosis Date    Anemia     GERD (gastroesophageal reflux disease)     Post partum depression     Rash of genital area     started a few weeks ago, red     Past Surgical History:   Procedure Laterality Date    APPENDECTOMY       SECTION      CHOLECYSTECTOMY      NM  DELIVERY ONLY N/A 2019    Procedure:  SECTION () REPEAT;  Surgeon: iLsa Cabrera MD;  Location: [facility];  Service: Obstetrics    [place] LAP,CHOLECYSTECTOMY/GRAPH N/A 2020    Procedure: LAPAROSCOPIC CHOLECYSTECTOMY WITH INTRAOP CHOLANGIOGRAM;  Surgeon: Jerica Mansfield MD;  Location: [address] [place] [address] MAIN OR;  Service: General     Social History   Social History     Substance and Sexual Activity   Alcohol Use No     Social History     Substance and Sexual Activity   Drug Use Not Currently     Social History     Tobacco Use   Smoking Status Former Smoker    Quit date: 2017    Years since quittin 0   Smokeless Tobacco Never Used     Family History   Problem Relation Age of Onset    Greg's disease Family     [place]'s disease Mother     No Known Problems Father     No Known Problems Sister     No Known Problems Brother        Meds/Allergies       Current Outpatient Medications:     ibuprofen (MOTRIN) 600 mg tablet    omeprazole (PriLOSEC) 40 MG capsule    cyclobenzaprine (FLEXERIL) 10 mg tablet    Current Facility-Administered Medications:     lactated ringers infusion, 50 mL/hr, Intravenous, Continuous, Continue from Pre-op at 21 0914    lidocaine (PF) (XYLOCAINE-MPF) 1 % injection 0 5 mL, 0 5 mL, Infiltration, Once PRN    Lidocaine Viscous HCl (XYLOCAINE) 2 % mucosal solution 15 mL, 15 mL, Swish & Spit, Once    Lidocaine Viscous HCl (XYLOCAINE) 2 % mucosal solution, , , Code/Trauma/Sedation Med, 15 mL at 21 0915    Facility-Administered Medications Ordered in Other Encounters:     glycopyrrolate (ROBINUL) injection, , Intravenous, PRN, 0 2 mg at 21 0911    No Known Allergies    Objective     /75   Pulse 84   Temp (!) 97 3 °F (36 3 °C) (Temporal)   Resp 16   Ht 5' 6" (1 676 m)   Wt 112 kg (246 lb)   LMP 2021   SpO2 97%   BMI 39 71 kg/m²       PHYSICAL EXAM    Gen: NAD  Head: NCAT  CV: RRR  CHEST: Clear  ABD: soft, NT/ND  EXT: no edema      ASSESSMENT/PLAN:  This is a 32y o  year old female here for EGD, and she is stable and optimized for her procedure

## 2021-08-16 NOTE — ANESTHESIA PREPROCEDURE EVALUATION
Procedure:  EGD    Relevant Problems   GI/HEPATIC   (+) Gastroesophageal reflux disease      HEMATOLOGY   (+) Iron deficiency anemia      NEURO/PSYCH   (+) Chronic neck pain      Other   (+) Obesity, unspecified        Physical Exam    Airway    Mallampati score: II  TM Distance: >3 FB  Neck ROM: full     Dental       Cardiovascular      Pulmonary      Other Findings        Anesthesia Plan  ASA Score- 2     Anesthesia Type- IV sedation with anesthesia with ASA Monitors  Additional Monitors:   Airway Plan:           Plan Factors-Exercise tolerance (METS): >4 METS  Exercise comment: Able to climb two flights of stairs without cardiopulmonary limitation  Chart reviewed  EKG reviewed  Existing labs reviewed  Patient summary reviewed  Patient is not a current smoker (Quit 6 years ago)  Patient did not smoke on day of surgery  Induction- intravenous  Postoperative Plan-     Informed Consent- Anesthetic plan and risks discussed with patient

## 2021-08-16 NOTE — ANESTHESIA POSTPROCEDURE EVALUATION
Post-Op Assessment Note    CV Status:  Stable  Pain Score: 0    Pain management: adequate     Mental Status:  Arousable   Hydration Status:  Stable   PONV Controlled:  None   Airway Patency:  Patent      Post Op Vitals Reviewed: Yes            No complications documented      BP   83/46   Temp     Pulse  70   Resp   16   SpO2   97

## 2021-08-19 NOTE — RESULT ENCOUNTER NOTE
Please inform patient that biopsies from the stomach are benign and negative for bacteria called H pylori  Biopsy from the distal esophageal nodularity is completely unremarkable  Likely related to acid reflux  Continue omeprazole 40 mg twice a day as discussed following procedure  Please arrange for office follow-up with me sometime in the next 10 weeks  Place her on a cancellation list or I can find a place for her  Please schedule for repeat EGD for sometime in the next 12-16 weeks with me    Thank you

## 2021-08-31 ENCOUNTER — TELEPHONE (OUTPATIENT)
Dept: GASTROENTEROLOGY | Facility: CLINIC | Age: 31
End: 2021-08-31

## 2021-08-31 NOTE — TELEPHONE ENCOUNTER
----- Message from Elis Brooks sent at 8/19/2021  9:36 AM EDT -----  Routed to Maureen to schedule EGD

## 2021-09-06 ENCOUNTER — OFFICE VISIT (OUTPATIENT)
Dept: URGENT CARE | Facility: CLINIC | Age: 31
End: 2021-09-06
Payer: COMMERCIAL

## 2021-09-06 VITALS
SYSTOLIC BLOOD PRESSURE: 135 MMHG | TEMPERATURE: 98.4 F | OXYGEN SATURATION: 100 % | HEART RATE: 73 BPM | RESPIRATION RATE: 18 BRPM | DIASTOLIC BLOOD PRESSURE: 79 MMHG

## 2021-09-06 DIAGNOSIS — L03.114 CELLULITIS OF LEFT UPPER EXTREMITY: Primary | ICD-10-CM

## 2021-09-06 PROCEDURE — 99213 OFFICE O/P EST LOW 20 MIN: CPT | Performed by: NURSE PRACTITIONER

## 2021-09-06 RX ORDER — CEPHALEXIN 500 MG/1
500 CAPSULE ORAL 4 TIMES DAILY
Qty: 40 CAPSULE | Refills: 0 | Status: SHIPPED | OUTPATIENT
Start: 2021-09-06 | End: 2021-09-16

## 2021-09-06 NOTE — PROGRESS NOTES
[address] [place] [facility]        NAME: Torito Dexter is a 32 y o  female  : 1990    MRN: [id number]  DATE: 2021  TIME: 3:46 PM      Assessment and Plan     Cellulitis of left upper extremity [L03 [id number]]  1  Cellulitis of left upper extremity           Patient Instructions     Patient Instructions         Cellulitis   AMBULATORY CARE:   Cellulitis  is a skin infection caused by bacteria  Cellulitis is common and can become severe  Cellulitis usually appears on the lower legs  It can also appear on the arms, face, and other areas  Cellulitis develops when bacteria enter a crack or break in your skin, such as a scratch, bite, or cut  Common signs and symptoms:  Signs and symptoms usually appear on one side of your body  You may have any of the following:  · A fever    · A red, warm, swollen area on your skin    · Pain when the area is touched    · Red spots, bumps, or blisters that may drain pus    · Bumpy, raised skin that feels like an orange peel    Seek care immediately if:   · Your wound gets larger and more painful  · You feel a crackling under your skin when you touch it  · You have purple dots or bumps on your skin, or you see bleeding under your skin  · You see red streaks coming from the infected area  Call your doctor if:   · The red, warm, swollen area gets larger  · Your fever or pain does not go away or gets worse  · The area does not get smaller after 3 days of antibiotics  · You have questions or concerns about your condition or care  Treatment:  You should start to see improvement in 3 days  If your cellulitis is severe, you may need IV antibiotics in the hospital  If cellulitis is not treated, the infection can spread through your body and become life-threatening  You may need any of the following medicines:  · Antibiotics  help treat the bacterial infection  · Acetaminophen  decreases pain and fever  It is available without a doctor's order   Ask how much to take and how often to take it  Follow directions  Read the labels of all other medicines you are using to see if they also contain acetaminophen, or ask your doctor or pharmacist  Acetaminophen can cause liver damage if not taken correctly  Do not use more than 4 grams (4,000 milligrams) total of acetaminophen in one day  · NSAIDs , such as ibuprofen, help decrease swelling, pain, and fever  This medicine is available with or without a doctor's order  NSAIDs can cause stomach bleeding or kidney problems in certain people  If you take blood thinner medicine, always ask your healthcare provider if NSAIDs are safe for you  Always read the medicine label and follow directions  · Take your medicine as directed  Contact your healthcare provider if you think your medicine is not helping or if you have side effects  Tell him or her if you are allergic to any medicine  Keep a list of the medicines, vitamins, and herbs you take  Include the amounts, and when and why you take them  Bring the list or the pill bottles to follow-up visits  Carry your medicine list with you in case of an emergency  Self-care:   · Wash the area with soap and water every day  Gently pat dry  Use bandages if directed by your healthcare provider  · Elevate the area above the level of your heart  as often as you can  This will help decrease swelling and pain  Prop the area on pillows or blankets to keep it elevated comfortably  · Place a cool, damp cloth on the area  Use clean cloths and clean water  You can do this as often as you need to  Cool, damp cloths may help decrease pain  · Apply cream or ointment as directed  These help protect the area  Most over-the-counter products, such as petroleum jelly, are good to use  Ask your healthcare provider about specific creams or ointments you should use  Prevent cellulitis:   · Do not scratch bug bites or areas of injury    You increase your risk for cellulitis by scratching these areas  · Do not share personal items, such as towels, clothing, and razors  · Clean exercise equipment  with germ-killing  before and after you use it  · Treat athlete's foot  This can help prevent the spread of a bacterial skin infection  · Wash your hands often  Use soap and water  Wash your hands after you use the bathroom, change a child's diapers, or sneeze  Wash your hands before you prepare or eat food  Use lotion to prevent dry, cracked skin  Follow up with your doctor within 3 days, or as directed:  He or she will check if your cellulitis is getting better  Write down your questions so you remember to ask them during your visits  © Copyright [employer] 2021 Information is for End User's use only and may not be sold, redistributed or otherwise used for commercial purposes  All illustrations and images included in CareNotes® are the copyrighted property of [employer] M [employer] Inc  or [employer]   The above information is an  only  It is not intended as medical advice for individual conditions or treatments  Talk to your doctor, nurse or pharmacist before following any medical regimen to see if it is safe and effective for you  Follow up with PCP in 3-5 days  Proceed to  ER if symptoms worsen  Chief Complaint     Chief Complaint   Patient presents with    Insect Bite     Left arm for three days  History of Present Illness     First noticed left forearm discomfort at a picnic on Saturday--thinks she was bit by a bug  Sunday there was a quarter sized red area she circled  Today the area is about 3+ inches across, so she comes here to be seen  She has been using antibiotic ointment and taking benadryl without improvement  Review of Systems     Review of Systems   Constitutional: Negative for chills and fever  Musculoskeletal: Negative  Skin: Positive for color change     All other systems reviewed and are negative  Current Medications       Current Outpatient Medications:     cyclobenzaprine (FLEXERIL) 10 mg tablet, Take 1 tablet (10 mg total) by mouth 3 (three) times a day as needed for muscle spasms (Patient not taking: Reported on 2021), Disp: 30 tablet, Rfl: 0    ibuprofen (MOTRIN) 600 mg tablet, Take 1 tablet (600 mg total) by mouth every 6 (six) hours as needed (cramping), Disp: 30 tablet, Rfl: 0    omeprazole (PriLOSEC) 40 MG capsule, Take 1 capsule (40 mg total) by mouth 2 (two) times a day before meals Take 30 minutes to 1 hour prior to breakfast and prior to dinner, Disp: 60 capsule, Rfl: 5    Current Allergies     Allergies as of 2021    (No Known Allergies)              The following portions of the patient's history were reviewed and updated as appropriate: allergies, current medications, past family history, past medical history, past social history, past surgical history and problem list      Past Medical History:   Diagnosis Date    Anemia     GERD (gastroesophageal reflux disease)     Post partum depression     Rash of genital area     started a few weeks ago, red       Past Surgical History:   Procedure Laterality Date    APPENDECTOMY       SECTION  2013    CHOLECYSTECTOMY      MI  DELIVERY ONLY N/A 2019    Procedure:  SECTION () REPEAT;  Surgeon: Newton Hewitt MD;  Location: [facility];  Service: Obstetrics    MI LAP,CHOLECYSTECTOMY/GRAPH N/A 2020    Procedure: LAPAROSCOPIC CHOLECYSTECTOMY WITH INTRAOP CHOLANGIOGRAM;  Surgeon: Sujit Delatorre MD;  Location: [address] [place] [address];  Service: General       Family History   Problem Relation Age of Onset    Ranburne's disease Family     Greg's disease Mother     No Known Problems Father     No Known Problems Sister     No Known Problems Brother          Medications have been verified          Objective     /79   Pulse 73   Temp 98 4 °F (36 9 °C)   Resp 18 SpO2 100%   No LMP recorded  Physical Exam     Physical Exam  Vitals and nursing note reviewed  Constitutional:       General: She is not in acute distress  Appearance: Normal appearance  She is well-developed  She is not ill-appearing, toxic-appearing or diaphoretic  HENT:      Head: Normocephalic and atraumatic  Eyes:      Pupils: Pupils are equal, round, and reactive to light  Pulmonary:      Effort: Pulmonary effort is normal  No respiratory distress  Abdominal:      General: There is no distension  Palpations: Abdomen is soft  Musculoskeletal:         General: Normal range of motion  Cervical back: Normal range of motion and neck supple  Skin:     General: Skin is warm and dry  Capillary Refill: Capillary refill takes less than 2 seconds  Findings: Erythema present  Neurological:      General: No focal deficit present  Mental Status: She is alert and oriented to person, place, and time  Psychiatric:         Mood and Affect: Mood normal          Behavior: Behavior normal          Thought Content:  Thought content normal          Judgment: Judgment normal

## 2021-09-06 NOTE — PATIENT INSTRUCTIONS
Take the keflex as ordered until completed  Eat yogurt or take a probiotic to restore good bacteria to your gut; this helps prevent stomach irritation/diarrhea while on an antibiotic  After 24 hours, the redness should not get worse  It may take a few days to look significantly better  Cellulitis   AMBULATORY CARE:   Cellulitis  is a skin infection caused by bacteria  Cellulitis is common and can become severe  Cellulitis usually appears on the lower legs  It can also appear on the arms, face, and other areas  Cellulitis develops when bacteria enter a crack or break in your skin, such as a scratch, bite, or cut  Common signs and symptoms:  Signs and symptoms usually appear on one side of your body  You may have any of the following:  · A fever    · A red, warm, swollen area on your skin    · Pain when the area is touched    · Red spots, bumps, or blisters that may drain pus    · Bumpy, raised skin that feels like an orange peel    Seek care immediately if:   · Your wound gets larger and more painful  · You feel a crackling under your skin when you touch it  · You have purple dots or bumps on your skin, or you see bleeding under your skin  · You see red streaks coming from the infected area  Call your doctor if:   · The red, warm, swollen area gets larger  · Your fever or pain does not go away or gets worse  · The area does not get smaller after 3 days of antibiotics  · You have questions or concerns about your condition or care  Treatment:  You should start to see improvement in 3 days  If your cellulitis is severe, you may need IV antibiotics in the hospital  If cellulitis is not treated, the infection can spread through your body and become life-threatening  You may need any of the following medicines:  · Antibiotics  help treat the bacterial infection  · Acetaminophen  decreases pain and fever  It is available without a doctor's order   Ask how much to take and how often to take it  Follow directions  Read the labels of all other medicines you are using to see if they also contain acetaminophen, or ask your doctor or pharmacist  Acetaminophen can cause liver damage if not taken correctly  Do not use more than 4 grams (4,000 milligrams) total of acetaminophen in one day  · NSAIDs , such as ibuprofen, help decrease swelling, pain, and fever  This medicine is available with or without a doctor's order  NSAIDs can cause stomach bleeding or kidney problems in certain people  If you take blood thinner medicine, always ask your healthcare provider if NSAIDs are safe for you  Always read the medicine label and follow directions  · Take your medicine as directed  Contact your healthcare provider if you think your medicine is not helping or if you have side effects  Tell him or her if you are allergic to any medicine  Keep a list of the medicines, vitamins, and herbs you take  Include the amounts, and when and why you take them  Bring the list or the pill bottles to follow-up visits  Carry your medicine list with you in case of an emergency  Self-care:   · Wash the area with soap and water every day  Gently pat dry  Use bandages if directed by your healthcare provider  · Elevate the area above the level of your heart  as often as you can  This will help decrease swelling and pain  Prop the area on pillows or blankets to keep it elevated comfortably  · Place a cool, damp cloth on the area  Use clean cloths and clean water  You can do this as often as you need to  Cool, damp cloths may help decrease pain  · Apply cream or ointment as directed  These help protect the area  Most over-the-counter products, such as petroleum jelly, are good to use  Ask your healthcare provider about specific creams or ointments you should use  Prevent cellulitis:   · Do not scratch bug bites or areas of injury  You increase your risk for cellulitis by scratching these areas      · Do not share personal items, such as towels, clothing, and razors  · Clean exercise equipment  with germ-killing  before and after you use it  · Treat athlete's foot  This can help prevent the spread of a bacterial skin infection  · Wash your hands often  Use soap and water  Wash your hands after you use the bathroom, change a child's diapers, or sneeze  Wash your hands before you prepare or eat food  Use lotion to prevent dry, cracked skin  Follow up with your doctor within 3 days, or as directed:  He or she will check if your cellulitis is getting better  Write down your questions so you remember to ask them during your visits  © Copyright [employer] 2021 Information is for End User's use only and may not be sold, redistributed or otherwise used for commercial purposes  All illustrations and images included in CareNotes® are the copyrighted property of [employer] , Inc  or [employer]  The above information is an  only  It is not intended as medical advice for individual conditions or treatments  Talk to your doctor, nurse or pharmacist before following any medical regimen to see if it is safe and effective for you

## 2021-09-20 ENCOUNTER — PREP FOR PROCEDURE (OUTPATIENT)
Dept: GASTROENTEROLOGY | Facility: CLINIC | Age: 31
End: 2021-09-20

## 2021-09-20 DIAGNOSIS — K22.10 EROSIVE ESOPHAGITIS: Primary | ICD-10-CM

## 2021-09-20 NOTE — TELEPHONE ENCOUNTER
Pt is scheduled with dr Judy Lange at [facility] on 12/13/21 for an egd, I went over instructions with pt and mailed out to pt home  Patient is aware she will need a  to and from   She will get a call the day before with an exact time for arrival

## 2021-09-20 NOTE — TELEPHONE ENCOUNTER
[place]'s disease is a very specific condition that is tested at [facility]  I looked into it and the following 2 are closest for the patient (1 in [address] [place] [address] and 1 down in [facility]):    [phone]  [address], [address] [place] [address], [place], [address]  [address] [facility], [place], [address]  [address], [address] [address]  [email or web address]  [facility]  [address]     Please call the patient back to give information

## 2021-09-20 NOTE — TELEPHONE ENCOUNTER
Call back # [phone]    Pt called back to ask if we know where she  can be consulted for dall[place]'s disease

## 2021-12-13 ENCOUNTER — HOSPITAL ENCOUNTER (OUTPATIENT)
Dept: GASTROENTEROLOGY | Facility: HOSPITAL | Age: 31
Setting detail: OUTPATIENT SURGERY
Discharge: HOME | End: 2021-12-13
Attending: INTERNAL MEDICINE
Payer: COMMERCIAL

## 2021-12-13 ENCOUNTER — ANESTHESIA EVENT (OUTPATIENT)
Dept: GASTROENTEROLOGY | Facility: HOSPITAL | Age: 31
End: 2021-12-13

## 2021-12-13 ENCOUNTER — ANESTHESIA (OUTPATIENT)
Dept: GASTROENTEROLOGY | Facility: HOSPITAL | Age: 31
End: 2021-12-13
Payer: COMMERCIAL

## 2021-12-13 VITALS
HEIGHT: 66 IN | OXYGEN SATURATION: 95 % | SYSTOLIC BLOOD PRESSURE: 114 MMHG | RESPIRATION RATE: 18 BRPM | BODY MASS INDEX: 40.18 KG/M2 | WEIGHT: 250 LBS | HEART RATE: 66 BPM | DIASTOLIC BLOOD PRESSURE: 54 MMHG | TEMPERATURE: 98.2 F

## 2021-12-13 DIAGNOSIS — K22.10 EROSIVE ESOPHAGITIS: ICD-10-CM

## 2021-12-13 PROBLEM — Z98.890 PONV (POSTOPERATIVE NAUSEA AND VOMITING): Status: ACTIVE | Noted: 2021-12-13

## 2021-12-13 PROBLEM — R11.2 PONV (POSTOPERATIVE NAUSEA AND VOMITING): Status: ACTIVE | Noted: 2021-12-13

## 2021-12-13 LAB
EXT PREGNANCY TEST URINE: NEGATIVE
EXT. CONTROL: NORMAL

## 2021-12-13 PROCEDURE — 88305 TISSUE EXAM BY PATHOLOGIST: CPT | Performed by: PATHOLOGY

## 2021-12-13 PROCEDURE — 81025 URINE PREGNANCY TEST: CPT | Performed by: ANESTHESIOLOGY

## 2021-12-13 PROCEDURE — 43239 EGD BIOPSY SINGLE/MULTIPLE: CPT | Performed by: INTERNAL MEDICINE

## 2021-12-13 RX ORDER — LIDOCAINE HYDROCHLORIDE 20 MG/ML
INJECTION, SOLUTION EPIDURAL; INFILTRATION; INTRACAUDAL; PERINEURAL AS NEEDED
Status: DISCONTINUED | OUTPATIENT
Start: 2021-12-13 | End: 2021-12-13

## 2021-12-13 RX ORDER — PROPOFOL 10 MG/ML
INJECTION, EMULSION INTRAVENOUS AS NEEDED
Status: DISCONTINUED | OUTPATIENT
Start: 2021-12-13 | End: 2021-12-13

## 2021-12-13 RX ORDER — GLYCOPYRROLATE 0.2 MG/ML
INJECTION INTRAMUSCULAR; INTRAVENOUS AS NEEDED
Status: DISCONTINUED | OUTPATIENT
Start: 2021-12-13 | End: 2021-12-13

## 2021-12-13 RX ORDER — LIDOCAINE HYDROCHLORIDE 20 MG/ML
15 SOLUTION OROPHARYNGEAL ONCE
Status: DISCONTINUED | OUTPATIENT
Start: 2021-12-13 | End: 2021-12-17 | Stop reason: HOSPADM

## 2021-12-13 RX ORDER — LIDOCAINE HYDROCHLORIDE 20 MG/ML
SOLUTION OROPHARYNGEAL
Status: DISPENSED
Start: 2021-12-13 | End: 2021-12-13

## 2021-12-13 RX ORDER — SODIUM CHLORIDE, SODIUM LACTATE, POTASSIUM CHLORIDE, CALCIUM CHLORIDE 600; 310; 30; 20 MG/100ML; MG/100ML; MG/100ML; MG/100ML
125 INJECTION, SOLUTION INTRAVENOUS CONTINUOUS
Status: DISCONTINUED | OUTPATIENT
Start: 2021-12-13 | End: 2021-12-17 | Stop reason: HOSPADM

## 2021-12-13 RX ORDER — LIDOCAINE HYDROCHLORIDE 20 MG/ML
SOLUTION OROPHARYNGEAL CODE/TRAUMA/SEDATION MEDICATION
Status: COMPLETED | OUTPATIENT
Start: 2021-12-13 | End: 2021-12-13

## 2021-12-13 RX ADMIN — LIDOCAINE HYDROCHLORIDE 50 MG: 20 INJECTION, SOLUTION EPIDURAL; INFILTRATION; INTRACAUDAL; PERINEURAL at 09:12

## 2021-12-13 RX ADMIN — PROPOFOL 150 MG: 10 INJECTION, EMULSION INTRAVENOUS at 09:12

## 2021-12-13 RX ADMIN — SODIUM CHLORIDE, SODIUM LACTATE, POTASSIUM CHLORIDE, AND CALCIUM CHLORIDE 125 ML/HR: .6; .31; .03; .02 INJECTION, SOLUTION INTRAVENOUS at 07:49

## 2021-12-13 RX ADMIN — PROPOFOL 50 MG: 10 INJECTION, EMULSION INTRAVENOUS at 09:15

## 2021-12-13 RX ADMIN — PROPOFOL 50 MG: 10 INJECTION, EMULSION INTRAVENOUS at 09:18

## 2021-12-13 RX ADMIN — LIDOCAINE HYDROCHLORIDE 15 ML: 20 SOLUTION ORAL; TOPICAL at 09:12

## 2021-12-13 RX ADMIN — GLYCOPYRROLATE 0.2 MG: 0.2 INJECTION, SOLUTION INTRAMUSCULAR; INTRAVENOUS at 09:10

## 2021-12-13 RX ADMIN — PROPOFOL 50 MG: 10 INJECTION, EMULSION INTRAVENOUS at 09:23

## 2021-12-28 ENCOUNTER — TELEPHONE (OUTPATIENT)
Dept: GASTROENTEROLOGY | Facility: CLINIC | Age: 31
End: 2021-12-28

## 2021-12-28 ENCOUNTER — PREP FOR PROCEDURE (OUTPATIENT)
Dept: GASTROENTEROLOGY | Facility: CLINIC | Age: 31
End: 2021-12-28

## 2021-12-28 DIAGNOSIS — K22.10 EROSIVE ESOPHAGITIS: Primary | ICD-10-CM

## 2022-02-28 ENCOUNTER — TELEPHONE (OUTPATIENT)
Dept: GASTROENTEROLOGY | Facility: HOSPITAL | Age: 32
End: 2022-02-28

## 2022-08-08 DIAGNOSIS — K22.10 EROSIVE ESOPHAGITIS: ICD-10-CM

## 2022-08-08 NOTE — TELEPHONE ENCOUNTER
Patient's pharmacy faxed form for refill of Omeprazole to be sent to [facility] in [place]

## 2022-08-09 RX ORDER — OMEPRAZOLE 40 MG/1
40 CAPSULE, DELAYED RELEASE ORAL
Qty: 60 CAPSULE | Refills: 5 | Status: SHIPPED | OUTPATIENT
Start: 2022-08-09

## 2022-09-13 ENCOUNTER — HOSPITAL ENCOUNTER (EMERGENCY)
Facility: HOSPITAL | Age: 32
Discharge: HOME | End: 2022-09-13
Attending: FAMILY MEDICINE
Payer: COMMERCIAL

## 2022-09-13 VITALS
SYSTOLIC BLOOD PRESSURE: 131 MMHG | RESPIRATION RATE: 18 BRPM | TEMPERATURE: 98.4 F | HEART RATE: 75 BPM | DIASTOLIC BLOOD PRESSURE: 72 MMHG | OXYGEN SATURATION: 100 %

## 2022-09-13 DIAGNOSIS — K08.89 DENTALGIA: Primary | ICD-10-CM

## 2022-09-13 PROCEDURE — 99284 EMERGENCY DEPT VISIT MOD MDM: CPT | Performed by: PHYSICIAN ASSISTANT

## 2022-09-13 PROCEDURE — 99282 EMERGENCY DEPT VISIT SF MDM: CPT

## 2022-09-13 RX ORDER — KETOROLAC TROMETHAMINE 10 MG/1
10 TABLET, FILM COATED ORAL EVERY 6 HOURS PRN
Qty: 20 TABLET | Refills: 0 | Status: SHIPPED | OUTPATIENT
Start: 2022-09-13

## 2022-09-13 RX ORDER — ESCITALOPRAM OXALATE 5 MG/1
5 TABLET ORAL DAILY
COMMUNITY
Start: 2022-07-12

## 2022-09-13 RX ORDER — LIDOCAINE HYDROCHLORIDE 20 MG/ML
15 SOLUTION OROPHARYNGEAL 4 TIMES DAILY PRN
Qty: 100 ML | Refills: 0 | Status: SHIPPED | OUTPATIENT
Start: 2022-09-13

## 2022-09-14 NOTE — ED PROVIDER NOTES
History  Chief Complaint   Patient presents with    Ear Problem     Pt reports headache x3 days, now right ear aching with neck aching  No loss of hearing  35-year-old female presents to the emergency department seeking evaluation for a mild posterior headache and right ear aching with associated neck ache  There is no reported loss of hearing  She is overall well-appearing in no acute distress  She also reports a fracture to the right jaw  Patient states the pain does extend along her jawline as well as the right side year  She has been taking some NSAIDs with minimal relief of symptoms  No injuries to the area  No reported rash  No fevers chills  Patient does report increased cold sensitivity on the right side of the jaw  Allergies reviewed          Prior to Admission Medications   Prescriptions Last Dose Informant Patient Reported? Taking?    cyclobenzaprine (FLEXERIL) 10 mg tablet Not Taking at Unknown time  No No   Sig: Take 1 tablet (10 mg total) by mouth 3 (three) times a day as needed for muscle spasms   Patient not taking: No sig reported   escitalopram (LEXAPRO) 5 mg tablet Not Taking at Unknown time  Yes No   Sig: Take 5 mg by mouth daily   Patient not taking: Reported on 9/13/2022   ibuprofen (MOTRIN) 600 mg tablet Not Taking at Unknown time Self No No   Sig: Take 1 tablet (600 mg total) by mouth every 6 (six) hours as needed (cramping)   Patient not taking: Reported on 9/13/2022   omeprazole (PriLOSEC) 40 MG capsule 9/13/2022 at Unknown time  No Yes   Sig: Take 1 capsule (40 mg total) by mouth 2 (two) times a day before meals Take 30 minutes to 1 hour prior to breakfast and prior to dinner      Facility-Administered Medications: None       Past Medical History:   Diagnosis Date    Anemia     GERD (gastroesophageal reflux disease)     Post partum depression 2013    Rash of genital area     started a few weeks ago, red       Past Surgical History:   Procedure Laterality Date    APPENDECTOMY       SECTION  2013    CHOLECYSTECTOMY      [place]  DELIVERY ONLY N/A 2019    Procedure:  SECTION () REPEAT;  Surgeon: Mary Carmen Rice MD;  Location[facility];  Service: Obstetrics    [place] LAP,CHOLECYSTECTOMY/GRAPH N/A 2020    Procedure: LAPAROSCOPIC CHOLECYSTECTOMY WITH INTRAOP CHOLANGIOGRAM;  Surgeon: Blessing Collado MD;  Location: [address] [place] [address] OR;  Service: General       Family History   Problem Relation Age of Onset    Greg's disease Family     Greg's disease Mother     No Known Problems Father     No Known Problems Sister     No Known Problems Brother      I have reviewed and agree with the history as documented  E-Cigarette/Vaping    E-Cigarette Use Never User      E-Cigarette/Vaping Substances    Nicotine No     THC No     CBD No     Flavoring No     Other No     Unknown No      Social History     Tobacco Use    Smoking status: Former Smoker     Quit date: 2017     Years since quittin 1    Smokeless tobacco: Never Used   Vaping Use    Vaping Use: Never used   Substance Use Topics    Alcohol use: No    Drug use: Not Currently       Review of Systems   Constitutional: Negative for chills and fever  HENT: Positive for dental problem  Negative for ear pain and sore throat  Eyes: Negative for pain and visual disturbance  Respiratory: Negative for cough and shortness of breath  Cardiovascular: Negative for chest pain and palpitations  Gastrointestinal: Negative for abdominal pain and vomiting  Genitourinary: Negative for dysuria and hematuria  Musculoskeletal: Negative for arthralgias and back pain  Skin: Negative for color change and rash  Neurological: Negative for seizures and syncope  All other systems reviewed and are negative  Physical Exam  Physical Exam  Vitals and nursing note reviewed  Constitutional:       General: She is not in acute distress       Appearance: She is well-developed  She is not diaphoretic  HENT:      Head: Normocephalic and atraumatic  Comments: No evidence of otitis media or otitis externa  No rashes on the right side of the face  The pain is not a shooting or stabbing pain  Patient does not describe it like electricity shooting across the face  No mastoid tenderness  Fractured tooth noted on the right lower posterior jaw  Right Ear: External ear normal       Left Ear: External ear normal       Nose: Nose normal    Eyes:      Conjunctiva/sclera: Conjunctivae normal       Pupils: Pupils are equal, round, and reactive to light  Cardiovascular:      Rate and Rhythm: Normal rate and regular rhythm  Heart sounds: Normal heart sounds  No murmur heard  No friction rub  No gallop  Pulmonary:      Effort: Pulmonary effort is normal  No respiratory distress  Breath sounds: Normal breath sounds  No stridor  No wheezing or rales  Abdominal:      General: Bowel sounds are normal  There is no distension  Palpations: Abdomen is soft  Tenderness: There is no abdominal tenderness  There is no guarding  Musculoskeletal:         General: No tenderness  Normal range of motion  Cervical back: Normal range of motion  Skin:     General: Skin is warm  Capillary Refill: Capillary refill takes less than 2 seconds  Neurological:      Mental Status: She is alert and oriented to person, place, and time           Vital Signs  ED Triage Vitals [09/13/22 1113]   Temperature Pulse Respirations Blood Pressure SpO2   98 4 °F (36 9 °C) 75 18 131/72 100 %      Temp Source Heart Rate Source Patient Position - Orthostatic VS BP Location FiO2 (%)   Oral Monitor Sitting Right arm --      Pain Score       4           Vitals:    09/13/22 1113   BP: 131/72   Pulse: 75   Patient Position - Orthostatic VS: Sitting         Visual Acuity      ED Medications  Medications - No data to display    Diagnostic Studies  Results Reviewed     None No orders to display              Procedures  Procedures         ED Course                                             MDM  Number of Diagnoses or Management Options  Dentalgia  Diagnosis management comments: Will trial topical lidocaine for pain  Patient's presenting symptoms and complaints are most compatible with dentalgia radiating across the jaw  No suspicion for mastoiditis trigeminal neuralgia or temporal arteritis at this time  Recommend NSAIDs  Recommend follow-up with oral surgery  Will likely require dental extraction  Patient educated regarding their diagnosis and given return and follow-up instructions  Patient was advised to returned to the ED with worsening symptoms or concerns  Patient is understanding of and in agreement with the treatment plan  There are no questions at the time of discharge  Risk of Complications, Morbidity, and/or Mortality  Presenting problems: low  Diagnostic procedures: low  Management options: low    Patient Progress  Patient progress: stable      Disposition  Final diagnoses:   Pinky Diamond     Time reflects when diagnosis was documented in both MDM as applicable and the Disposition within this note     Time User Action Codes Description Comment    9/13/2022 12:00 PM Nehemias Madera Add [R07 9] Chest pain radiating to jaw     9/13/2022 12:00 PM Nehemias Madera Remove [R07 9] Chest pain radiating to jaw     9/13/2022 12:00 PM Nehemias Frankel [[id number]] Pinky Diamond       ED Disposition     ED Disposition   Discharge    Condition   Stable    Date/Time   Tue Sep 13, 2022 12:00 PM    C/ Burns De Raul 81 discharge to home/self care                 Follow-up Information    None         Discharge Medication List as of 9/13/2022 12:01 PM      START taking these medications    Details   ketorolac (TORADOL) 10 mg tablet Take 1 tablet (10 mg total) by mouth every 6 (six) hours as needed for moderate pain, Starting Tue 9/13/2022, Normal      Lidocaine Viscous HCl (XYLOCAINE) 2 % mucosal solution Swish and spit 15 mL 4 (four) times a day as needed for mouth pain or discomfort You may apply the viscous lidocaine to cotton ball or cotton swab and apply to the fractured tooth for 10 minutes  , Starting Tue 9/13/2022, Normal         CONTINUE these medications which have NOT CHANGED    Details   omeprazole (PriLOSEC) 40 MG capsule Take 1 capsule (40 mg total) by mouth 2 (two) times a day before meals Take 30 minutes to 1 hour prior to breakfast and prior to dinner, Starting Tue 8/9/2022, Normal      cyclobenzaprine (FLEXERIL) 10 mg tablet Take 1 tablet (10 mg total) by mouth 3 (three) times a day as needed for muscle spasms, Starting Tue 6/1/2021, Normal      escitalopram (LEXAPRO) 5 mg tablet Take 5 mg by mouth daily, Starting Tue 7/12/2022, Historical Med      ibuprofen (MOTRIN) 600 mg tablet Take 1 tablet (600 mg total) by mouth every 6 (six) hours as needed (cramping), Starting Thu 11/7/2019, Normal                 PDMP Review       Value Time User    PDMP Reviewed  Yes 11/12/2020  8:53 AM Radha Mayen PA-C          ED Provider  Electronically Signed by           Usha Wilson PA-C  09/15/22 8576

## 2022-09-15 NOTE — RESULT ENCOUNTER NOTE
I left a message on patient's voicemail that biopsies of the duodenum negative for celiac disease, biopsies stomach were benign and negative for H pylori  Please arrange for follow-up office visit for an 8-10 weeks and as she will need repeat upper endoscopy in 12-16 weeks  I did ask her to call back if she had any questions and to let us know she got this message    Thank you DISPLAY PLAN FREE TEXT DISPLAY PLAN FREE TEXT DISPLAY PLAN FREE TEXT DISPLAY PLAN FREE TEXT DISPLAY PLAN FREE TEXT DISPLAY PLAN FREE TEXT DISPLAY PLAN FREE TEXT DISPLAY PLAN FREE TEXT DISPLAY PLAN FREE TEXT DISPLAY PLAN FREE TEXT

## 2023-05-08 ENCOUNTER — APPOINTMENT (OUTPATIENT)
Dept: LAB | Facility: CLINIC | Age: 33
End: 2023-05-08

## 2023-05-08 ENCOUNTER — OFFICE VISIT (OUTPATIENT)
Dept: FAMILY MEDICINE CLINIC | Facility: CLINIC | Age: 33
End: 2023-05-08

## 2023-05-08 VITALS
BODY MASS INDEX: 43.07 KG/M2 | SYSTOLIC BLOOD PRESSURE: 102 MMHG | TEMPERATURE: 97.1 F | DIASTOLIC BLOOD PRESSURE: 64 MMHG | HEART RATE: 98 BPM | WEIGHT: 268 LBS | HEIGHT: 66 IN | OXYGEN SATURATION: 98 %

## 2023-05-08 DIAGNOSIS — K21.9 GASTROESOPHAGEAL REFLUX DISEASE WITHOUT ESOPHAGITIS: Primary | ICD-10-CM

## 2023-05-08 DIAGNOSIS — R10.13 EPIGASTRIC ABDOMINAL PAIN: ICD-10-CM

## 2023-05-08 DIAGNOSIS — Z13.1 SCREENING FOR DIABETES MELLITUS: ICD-10-CM

## 2023-05-08 DIAGNOSIS — Z13.0 SCREENING FOR DEFICIENCY ANEMIA: ICD-10-CM

## 2023-05-08 DIAGNOSIS — Z13.220 SCREENING, LIPID: ICD-10-CM

## 2023-05-08 DIAGNOSIS — Z13.29 SCREENING FOR THYROID DISORDER: ICD-10-CM

## 2023-05-08 DIAGNOSIS — K22.10 EROSIVE ESOPHAGITIS: ICD-10-CM

## 2023-05-08 LAB
ALBUMIN SERPL BCP-MCNC: 3.8 G/DL (ref 3.5–5)
ALP SERPL-CCNC: 62 U/L (ref 46–116)
ALT SERPL W P-5'-P-CCNC: 39 U/L (ref 12–78)
ANION GAP SERPL CALCULATED.3IONS-SCNC: 2 MMOL/L (ref 4–13)
AST SERPL W P-5'-P-CCNC: 28 U/L (ref 5–45)
BASOPHILS # BLD AUTO: 0.01 THOUSANDS/ÂΜL (ref 0–0.1)
BASOPHILS NFR BLD AUTO: 0 % (ref 0–1)
BILIRUB SERPL-MCNC: 0.38 MG/DL (ref 0.2–1)
BUN SERPL-MCNC: 13 MG/DL (ref 5–25)
CALCIUM SERPL-MCNC: 9 MG/DL (ref 8.3–10.1)
CHLORIDE SERPL-SCNC: 109 MMOL/L (ref 96–108)
CHOLEST SERPL-MCNC: 140 MG/DL
CO2 SERPL-SCNC: 25 MMOL/L (ref 21–32)
CREAT SERPL-MCNC: 0.8 MG/DL (ref 0.6–1.3)
EOSINOPHIL # BLD AUTO: 0.09 THOUSAND/ÂΜL (ref 0–0.61)
EOSINOPHIL NFR BLD AUTO: 1 % (ref 0–6)
ERYTHROCYTE [DISTWIDTH] IN BLOOD BY AUTOMATED COUNT: 13.2 % (ref 11.6–15.1)
GFR SERPL CREATININE-BSD FRML MDRD: 97 ML/MIN/1.73SQ M
GLUCOSE P FAST SERPL-MCNC: 95 MG/DL (ref 65–99)
HCT VFR BLD AUTO: 42.2 % (ref 34.8–46.1)
HDLC SERPL-MCNC: 46 MG/DL
HGB BLD-MCNC: 13.1 G/DL (ref 11.5–15.4)
IMM GRANULOCYTES # BLD AUTO: 0.02 THOUSAND/UL (ref 0–0.2)
IMM GRANULOCYTES NFR BLD AUTO: 0 % (ref 0–2)
LDLC SERPL CALC-MCNC: 81 MG/DL (ref 0–100)
LYMPHOCYTES # BLD AUTO: 1.13 THOUSANDS/ÂΜL (ref 0.6–4.47)
LYMPHOCYTES NFR BLD AUTO: 17 % (ref 14–44)
MCH RBC QN AUTO: 28 PG (ref 26.8–34.3)
MCHC RBC AUTO-ENTMCNC: 31 G/DL (ref 31.4–37.4)
MCV RBC AUTO: 90 FL (ref 82–98)
MONOCYTES # BLD AUTO: 0.31 THOUSAND/ÂΜL (ref 0.17–1.22)
MONOCYTES NFR BLD AUTO: 5 % (ref 4–12)
NEUTROPHILS # BLD AUTO: 5.18 THOUSANDS/ÂΜL (ref 1.85–7.62)
NEUTS SEG NFR BLD AUTO: 77 % (ref 43–75)
NRBC BLD AUTO-RTO: 0 /100 WBCS
PLATELET # BLD AUTO: 274 THOUSANDS/UL (ref 149–390)
PMV BLD AUTO: 9.2 FL (ref 8.9–12.7)
POTASSIUM SERPL-SCNC: 4.2 MMOL/L (ref 3.5–5.3)
PROT SERPL-MCNC: 7.9 G/DL (ref 6.4–8.4)
RBC # BLD AUTO: 4.68 MILLION/UL (ref 3.81–5.12)
SODIUM SERPL-SCNC: 136 MMOL/L (ref 135–147)
TRIGL SERPL-MCNC: 63 MG/DL
TSH SERPL DL<=0.05 MIU/L-ACNC: 0.92 UIU/ML (ref 0.45–4.5)
WBC # BLD AUTO: 6.74 THOUSAND/UL (ref 4.31–10.16)

## 2023-05-08 NOTE — PROGRESS NOTES
Assessment/Plan:    Problem List Items Addressed This Visit     Gastroesophageal reflux disease - Primary    Epigastric abdominal pain    Relevant Orders    Ambulatory Referral to Gastroenterology   Other Visit Diagnoses     Erosive esophagitis        Relevant Orders    Ambulatory Referral to Gastroenterology    Screening for deficiency anemia        Relevant Orders    CBC and differential    Screening for diabetes mellitus        Relevant Orders    Comprehensive metabolic panel    Screening, lipid        Relevant Orders    Lipid Panel with Direct LDL reflex    Screening for thyroid disorder        Relevant Orders    TSH, 3rd generation with Free T4 reflex           Diagnoses and all orders for this visit:    Gastroesophageal reflux disease without esophagitis    Erosive esophagitis  -     Ambulatory Referral to Gastroenterology; Future    Epigastric abdominal pain  -     Ambulatory Referral to Gastroenterology; Future    Screening for deficiency anemia  -     CBC and differential; Future    Screening for diabetes mellitus  -     Comprehensive metabolic panel; Future    Screening, lipid  -     Lipid Panel with Direct LDL reflex; Future    Screening for thyroid disorder  -     TSH, 3rd generation with Free T4 reflex; Future        No problem-specific Assessment & Plan notes found for this encounter  Subjective:      Patient ID: Karine Hernandez is a 28 y o  female  NP with PMHx GERD with erosive esophagitis presents to [facility]  Patient states that she had a cholecystectomy a few years ago  Status postcholecystectomy patient endorses improvement with her GI health  She presents today with a complaint of increased bloat, gas, nausea and intermittent diarrhea  She has seen Dr Serge Moyer in the past and had 2 EGDs showing erosive esophagitis  Pt endorses Prilosec effectiveness has waned over time and symptoms are unrelated to foods         The following portions of the patient's history were reviewed and updated as appropriate:   She has a past medical history of Anemia, GERD (gastroesophageal reflux disease), Post partum depression (), and Rash of genital area  ,  does not have any pertinent problems on file  ,   has a past surgical history that includes Appendectomy;  section (); pr  delivery only (N/A, 2019); pr laps surg cholecystectomy w/cholangiography (N/A, 2020); and Cholecystectomy  ,  family history includes [place]'s disease in her family and mother; No Known Problems in her brother, father, and sister  ,   reports that she quit smoking about 5 years ago  Her smoking use included cigarettes  She has a 5 00 pack-year smoking history  She has never used smokeless tobacco  She reports that she does not currently use drugs  She reports that she does not drink alcohol ,  has No Known Allergies     Current Outpatient Medications   Medication Sig Dispense Refill   • omeprazole (PriLOSEC) 40 MG capsule Take 1 capsule (40 mg total) by mouth 2 (two) times a day before meals Take 30 minutes to 1 hour prior to breakfast and prior to dinner 60 capsule 5     No current facility-administered medications for this visit  BMI Counseling: Body mass index is 43 26 kg/m²  The BMI is above normal  Nutrition recommendations include decreasing portion sizes, encouraging healthy choices of fruits and vegetables, decreasing fast food intake, consuming healthier snacks, limiting drinks that contain sugar, moderation in carbohydrate intake, increasing intake of lean protein, reducing intake of saturated and trans fat and reducing intake of cholesterol  Exercise recommendations include exercising 3-5 times per week  No pharmacotherapy was ordered  Rationale for BMI follow-up plan is due to patient being overweight or obese  Review of Systems   Gastrointestinal: Positive for abdominal distention, diarrhea and nausea     All other systems reviewed and are "negative  Objective:  Vitals:    05/08/23 0707   BP: 102/64   BP Location: Left arm   Patient Position: Sitting   Cuff Size: Adult   Pulse: 98   Temp: (!) 97 1 °F (36 2 °C)   TempSrc: Tympanic   SpO2: 98%   Weight: 122 kg (268 lb)   Height: 5' 6\" (1 676 m)     Body mass index is 43 26 kg/m²  Physical Exam  Vitals and nursing note reviewed  Constitutional:       Appearance: She is well-developed  She is obese  HENT:      Head: Normocephalic and atraumatic  Right Ear: Tympanic membrane, ear canal and external ear normal       Left Ear: Tympanic membrane, ear canal and external ear normal       Nose: Nose normal       Mouth/Throat:      Mouth: Mucous membranes are moist       Pharynx: Uvula midline  Eyes:      General: Lids are normal       Conjunctiva/sclera: Conjunctivae normal       Pupils: Pupils are equal, round, and reactive to light  Neck:      Thyroid: No thyroid mass or thyromegaly  Vascular: No JVD  Trachea: Trachea and phonation normal    Cardiovascular:      Rate and Rhythm: Normal rate and regular rhythm  Pulses: Normal pulses  Heart sounds: Normal heart sounds, S1 normal and S2 normal  No murmur heard  No friction rub  No gallop  Pulmonary:      Effort: Pulmonary effort is normal       Breath sounds: Normal breath sounds  Abdominal:      General: Bowel sounds are normal       Palpations: Abdomen is soft  Tenderness: There is no abdominal tenderness  Genitourinary:     Comments: Deferred   Musculoskeletal:         General: Normal range of motion  Cervical back: Full passive range of motion without pain, normal range of motion and neck supple  Right lower leg: No edema  Left lower leg: No edema  Lymphadenopathy:      Head:      Right side of head: No submental, submandibular, tonsillar, preauricular, posterior auricular or occipital adenopathy        Left side of head: No submental, submandibular, tonsillar, preauricular, posterior " "auricular or occipital adenopathy  Cervical: No cervical adenopathy  Skin:     General: Skin is warm and dry  Capillary Refill: Capillary refill takes less than 2 seconds  Neurological:      General: No focal deficit present  Mental Status: She is alert and oriented to person, place, and time  Cranial Nerves: No cranial nerve deficit  Sensory: Sensation is intact  Motor: Motor function is intact  Coordination: Coordination is intact  Gait: Gait is intact  Deep Tendon Reflexes: Reflexes are normal and symmetric  Psychiatric:         Attention and Perception: Attention and perception normal          Mood and Affect: Mood and affect normal          Speech: Speech normal          Behavior: Behavior normal  Behavior is cooperative  Thought Content: Thought content normal          Cognition and Memory: Cognition normal          Judgment: Judgment normal            Portions of the record may have been created with voice recognition software  Occasional wrong word or \"sound a like\" substitutions may have occurred due to the inherent limitations of voice recognition software  Read the chart carefully and recognize, using context, where substitutions have occurred  Contact me with any questions    "

## 2023-06-15 ENCOUNTER — OFFICE VISIT (OUTPATIENT)
Dept: FAMILY MEDICINE CLINIC | Facility: CLINIC | Age: 33
End: 2023-06-15

## 2023-06-15 VITALS
SYSTOLIC BLOOD PRESSURE: 118 MMHG | HEIGHT: 66 IN | HEART RATE: 87 BPM | DIASTOLIC BLOOD PRESSURE: 72 MMHG | WEIGHT: 270 LBS | BODY MASS INDEX: 43.39 KG/M2 | OXYGEN SATURATION: 97 % | TEMPERATURE: 96.1 F

## 2023-06-15 DIAGNOSIS — Z13.29 SCREENING FOR THYROID DISORDER: ICD-10-CM

## 2023-06-15 DIAGNOSIS — Z13.1 SCREENING FOR DIABETES MELLITUS: ICD-10-CM

## 2023-06-15 DIAGNOSIS — Z13.0 SCREENING FOR DEFICIENCY ANEMIA: ICD-10-CM

## 2023-06-15 DIAGNOSIS — Z13.220 SCREENING, LIPID: ICD-10-CM

## 2023-06-15 DIAGNOSIS — Z00.00 WELL ADULT EXAM: Primary | ICD-10-CM

## 2023-06-15 NOTE — PROGRESS NOTES
[facility]    NAME: Marv Andersen  AGE: 35 y o  SEX: female  : 1990     DATE: 6/15/2023     Assessment and Plan:     Problem List Items Addressed This Visit    None  Visit Diagnoses     Well adult exam    -  Primary    Screening for deficiency anemia        Relevant Orders    CBC and differential    Screening for diabetes mellitus        Relevant Orders    Comprehensive metabolic panel    Screening for thyroid disorder        Relevant Orders    TSH, 3rd generation with Free T4 reflex    Screening, lipid        Relevant Orders    Lipid Panel with Direct LDL reflex          Immunizations and preventive care screenings were discussed with patient today  Appropriate education was printed on patient's after visit summary  Counseling:  Alcohol/drug use: discussed moderation in alcohol intake, the recommendations for healthy alcohol use, and avoidance of illicit drug use  Dental Health: discussed importance of regular tooth brushing, flossing, and dental visits  Injury prevention: discussed safety/seat belts, safety helmets, smoke detectors, carbon dioxide detectors, and smoking near bedding or upholstery  Sexual health: discussed sexually transmitted diseases, partner selection, use of condoms, avoidance of unintended pregnancy, and contraceptive alternatives  Exercise: the importance of regular exercise/physical activity was discussed  Recommend exercise 3-5 times per week for at least 30 minutes  Return in about 1 year (around 6/15/2024)  Chief Complaint:     Chief Complaint   Patient presents with   • Annual Exam     1 month f/u      History of Present Illness:     Adult Annual Physical   Patient here for a comprehensive physical exam  The patient reports no problems      Diet and Physical Activity  Diet/Nutrition: well balanced diet, limited junk food and consuming 3-5 servings of fruits/vegetables daily  Exercise: 1-2 times a week on average  Depression Screening  PHQ-2/9 Depression Screening         General Health  Sleep: gets 4-6 hours of sleep on average  Hearing: normal - bilateral   Vision: most recent eye exam <1 year ago and wears glasses  Dental: no dental visits for >1 year, brushes teeth twice daily and does not floss  /GYN Health  Last menstrual period: 2023  Contraceptive method: none  History of STDs?: yes  Review of Systems:     Review of Systems   All other systems reviewed and are negative       Past Medical History:     Past Medical History:   Diagnosis Date   • Anemia    • GERD (gastroesophageal reflux disease)    • Post partum depression    • Rash of genital area     started a few weeks ago, red      Past Surgical History:     Past Surgical History:   Procedure Laterality Date   • APPENDECTOMY     •  SECTION     • CHOLECYSTECTOMY     • MA  DELIVERY ONLY N/A 2019    Procedure:  SECTION () REPEAT;  Surgeon: Guevara Mejia MD;  [facility]: [facility];  Service: Obstetrics   • MA LAPS SURG CHOLECYSTECTOMY Evelyn Gonzalez N/A 2020    Procedure: LAPAROSCOPIC CHOLECYSTECTOMY WITH INTRAOP CHOLANGIOGRAM;  Surgeon: Andres Cruz MD;  Location: [address] [place] [address];  Service: General      Social History:     Social History     Socioeconomic History   • Marital status: Single     Spouse name: None   • Number of children: None   • Years of education: None   • Highest education level: None   Occupational History   • None   Tobacco Use   • Smoking status: Former     Packs/day: 1 00     Years: 5 00     Total pack years: 5 00     Types: Cigarettes     Quit date: 2017     Years since quittin 9   • Smokeless tobacco: Never   Vaping Use   • Vaping Use: Never used   Substance and Sexual Activity   • Alcohol use: No   • Drug use: Not Currently   • Sexual activity: Yes   Other Topics Concern   • None   Social "History Narrative   • None     Social Determinants of Health     Financial Resource Strain: Not on file   Food Insecurity: Not on file   Transportation Needs: Not on file   Physical Activity: Not on file   Stress: Not on file   Social Connections: Not on file   Intimate Partner Violence: Not on file   Housing Stability: Not on file      Family History:     Family History   Problem Relation Age of Onset   • Montague's disease Family    • Montague's disease Mother    • No Known Problems Father    • No Known Problems Sister    • No Known Problems Brother       Current Medications:     Current Outpatient Medications   Medication Sig Dispense Refill   • omeprazole (PriLOSEC) 40 MG capsule Take 1 capsule (40 mg total) by mouth 2 (two) times a day before meals Take 30 minutes to 1 hour prior to breakfast and prior to dinner 60 capsule 5     No current facility-administered medications for this visit  Allergies:     No Known Allergies   Physical Exam:     /72 (BP Location: Left arm, Patient Position: Sitting, Cuff Size: Adult)   Pulse 87   Temp (!) 96 1 °F (35 6 °C) (Tympanic)   Ht 5' 6\" (1 676 m)   Wt 122 kg (270 lb)   SpO2 97%   BMI 43 58 kg/m²     Physical Exam  Vitals and nursing note reviewed  Constitutional:       Appearance: Normal appearance  She is well-developed  HENT:      Head: Normocephalic and atraumatic  Right Ear: Tympanic membrane, ear canal and external ear normal       Left Ear: Tympanic membrane, ear canal and external ear normal       Nose: Nose normal       Mouth/Throat:      Mouth: Mucous membranes are moist    Eyes:      General: Lids are normal  Vision grossly intact  Conjunctiva/sclera: Conjunctivae normal       Pupils: Pupils are equal, round, and reactive to light  Cardiovascular:      Rate and Rhythm: Normal rate and regular rhythm  Pulses: Normal pulses  Heart sounds: Normal heart sounds, S1 normal and S2 normal       No friction rub  No gallop   No " S3 sounds  Pulmonary:      Effort: Pulmonary effort is normal       Breath sounds: Normal breath sounds  Abdominal:      General: Bowel sounds are normal       Palpations: Abdomen is soft  Tenderness: There is no abdominal tenderness  Genitourinary:     Comments: Deferred  Musculoskeletal:         General: Normal range of motion  Cervical back: Normal range of motion and neck supple  Right lower leg: No edema  Left lower leg: No edema  Lymphadenopathy:      Cervical: No cervical adenopathy  Skin:     General: Skin is warm and dry  Capillary Refill: Capillary refill takes less than 2 seconds  Neurological:      General: No focal deficit present  Mental Status: She is alert and oriented to person, place, and time  Motor: Motor function is intact  Gait: Gait is intact  Psychiatric:         Attention and Perception: Attention and perception normal          Mood and Affect: Mood and affect normal          Speech: Speech normal          Behavior: Behavior normal  Behavior is cooperative  Thought Content: Thought content normal          Cognition and Memory: Cognition and memory normal          Judgment: Judgment normal         No visits with results within 1 Month(s) from this visit     Latest known visit with results is:   Appointment on 05/08/2023   Component Date Value Ref Range Status   • Cholesterol 05/08/2023 140  See Comment mg/dL Final    Cholesterol:         Pediatric <18 Years        Desirable          <170 mg/dL      Borderline High    170-199 mg/dL      High               >=200 mg/dL        Adult >=18 Years            Desirable         <200 mg/dL      Borderline High   200-239 mg/dL      High              >239 mg/dL     • Triglycerides 05/08/2023 63  See Comment mg/dL Final    Triglyceride:     0-9Y            <75mg/dL     10Y-17Y         <90 mg/dL       >=18Y     Normal          <150 mg/dL     Borderline High 150-199 mg/dL     High 200-499 mg/dL        Very High       >499 mg/dL    Specimen collection should occur prior to N-Acetylcysteine or Metamizole administration due to the potential for falsely depressed results  • HDL, Direct 05/08/2023 46 (L)  >=50 mg/dL Final   • LDL Calculated 05/08/2023 81  0 - 100 mg/dL Final    This screening LDL is a calculated result  It does not have the accuracy of the Direct Measured LDL in the monitoring of patients with hyperlipidemia and/or statin therapy  Direct Measure LDL (DER707) must be ordered separately in these patients    LDL Cholesterol:     Optimal           <100 mg/dl     Near Optimal      100-129 mg/dl     Above Optimal       Borderline High 130-159 mg/dl       High            160-189 mg/dl       Very High       >189 mg/dl          • WBC 05/08/2023 6 74  4 31 - 10 16 Thousand/uL Final   • RBC 05/08/2023 4 68  3 81 - 5 12 Million/uL Final   • Hemoglobin 05/08/2023 13 1  11 5 - 15 4 g/dL Final   • Hematocrit 05/08/2023 42 2  34 8 - 46 1 % Final   • MCV 05/08/2023 90  82 - 98 fL Final   • MCH 05/08/2023 28 0  26 8 - 34 3 pg Final   • MCHC 05/08/2023 31 0 (L)  31 4 - 37 4 g/dL Final   • RDW 05/08/2023 13 2  11 6 - 15 1 % Final   • MPV 05/08/2023 9 2  8 9 - 12 7 fL Final   • Platelets 68/39/6750 274  149 - 390 Thousands/uL Final   • nRBC 05/08/2023 0  /100 WBCs Final   • Neutrophils Relative 05/08/2023 77 (H)  43 - 75 % Final   • Immat GRANS % 05/08/2023 0  0 - 2 % Final   • Lymphocytes Relative 05/08/2023 17  14 - 44 % Final   • Monocytes Relative 05/08/2023 5  4 - 12 % Final   • Eosinophils Relative 05/08/2023 1  0 - 6 % Final   • Basophils Relative 05/08/2023 0  0 - 1 % Final   • Neutrophils Absolute 05/08/2023 5 18  1 85 - 7 62 Thousands/µL Final   • Immature Grans Absolute 05/08/2023 0 02  0 00 - 0 20 Thousand/uL Final   • Lymphocytes Absolute 05/08/2023 1 13  0 60 - 4 47 Thousands/µL Final   • Monocytes Absolute 05/08/2023 0 31  0 17 - 1 22 Thousand/µL Final   • Eosinophils Absolute 05/08/2023 0 09  0 00 - 0 61 Thousand/µL Final   • Basophils Absolute 05/08/2023 0 01  0 00 - 0 10 Thousands/µL Final   • Sodium 05/08/2023 136  135 - 147 mmol/L Final   • Potassium 05/08/2023 4 2  3 5 - 5 3 mmol/L Final   • Chloride 05/08/2023 109 (H)  96 - 108 mmol/L Final   • CO2 05/08/2023 25  21 - 32 mmol/L Final   • ANION GAP 05/08/2023 2 (L)  4 - 13 mmol/L Final   • BUN 05/08/2023 13  5 - 25 mg/dL Final   • Creatinine 05/08/2023 0 80  0 60 - 1 30 mg/dL Final    Standardized to IDMS reference method   • Glucose, Fasting 05/08/2023 95  65 - 99 mg/dL Final    Specimen collection should occur prior to Sulfasalazine administration due to the potential for falsely depressed results  Specimen collection should occur prior to Sulfapyridine administration due to the potential for falsely elevated results  • Calcium 05/08/2023 9 0  8 3 - 10 1 mg/dL Final   • AST 05/08/2023 28  5 - 45 U/L Final    Specimen collection should occur prior to Sulfasalazine administration due to the potential for falsely depressed results  • ALT 05/08/2023 39  12 - 78 U/L Final    Specimen collection should occur prior to Sulfasalazine and/or Sulfapyridine administration due to the potential for falsely depressed results  • Alkaline Phosphatase 05/08/2023 62  46 - 116 U/L Final   • Total Protein 05/08/2023 7 9  6 4 - 8 4 g/dL Final   • Albumin 05/08/2023 3 8  3 5 - 5 0 g/dL Final   • Total Bilirubin 05/08/2023 0 38  0 20 - 1 00 mg/dL Final    Use of this assay is not recommended for patients undergoing treatment with eltrombopag due to the potential for falsely elevated results     • eGFR 05/08/2023 97  ml/min/1 73sq m Final   • TSH 3RD GENERATON 05/08/2023 0 922  0 450 - 4 500 uIU/mL Final    The recommended reference ranges for TSH during pregnancy are as follows:   First trimester 0 1 to 2 5 uIU/mL   Second trimester  0 2 to 3 0 uIU/mL   Third trimester 0 3 to 3 0 uIU/m    Note: Normal ranges may not apply to patients who are transgender, non-binary, or whose legal sex, sex at birth, and gender identity differ  Adult TSH (3rd generation) reference range follows the recommended guidelines of the American Thy[employer] Association, January, 2020  I have reviewed all the lab results  There are some abnormalities that are not critical to the patient's health, I have discussed these in person at this office appointment      Monisha Gomez, [facility]

## 2023-07-10 DIAGNOSIS — K22.10 EROSIVE ESOPHAGITIS: ICD-10-CM

## 2023-07-10 RX ORDER — OMEPRAZOLE 40 MG/1
CAPSULE, DELAYED RELEASE ORAL
Qty: 60 CAPSULE | Refills: 5 | Status: SHIPPED | OUTPATIENT
Start: 2023-07-10

## 2023-07-18 ENCOUNTER — OFFICE VISIT (OUTPATIENT)
Dept: FAMILY MEDICINE CLINIC | Facility: CLINIC | Age: 33
End: 2023-07-18
Payer: COMMERCIAL

## 2023-07-18 VITALS
HEIGHT: 66 IN | WEIGHT: 268.8 LBS | HEART RATE: 95 BPM | OXYGEN SATURATION: 99 % | SYSTOLIC BLOOD PRESSURE: 122 MMHG | DIASTOLIC BLOOD PRESSURE: 78 MMHG | BODY MASS INDEX: 43.2 KG/M2 | TEMPERATURE: 96.7 F

## 2023-07-18 DIAGNOSIS — M43.6 WRY NECK: ICD-10-CM

## 2023-07-18 DIAGNOSIS — H81.10 BPPV (BENIGN PAROXYSMAL POSITIONAL VERTIGO), UNSPECIFIED LATERALITY: Primary | ICD-10-CM

## 2023-07-18 DIAGNOSIS — M62.838 NECK MUSCLE SPASM: ICD-10-CM

## 2023-07-18 PROCEDURE — 99213 OFFICE O/P EST LOW 20 MIN: CPT | Performed by: NURSE PRACTITIONER

## 2023-07-18 NOTE — PROGRESS NOTES
Assessment/Plan:    Problem List Items Addressed This Visit     Neck muscle spasm    Relevant Orders    Ambulatory Referral to Chiropractic   Other Visit Diagnoses     BPPV (benign paroxysmal positional vertigo), unspecified laterality    -  Primary    Wry neck        Relevant Orders    Ambulatory Referral to Chiropractic           Diagnoses and all orders for this visit:    BPPV (benign paroxysmal positional vertigo), unspecified laterality    Wry neck  -     Ambulatory Referral to Chiropractic; Future    Neck muscle spasm  -     Ambulatory Referral to Chiropractic; Future        No problem-specific Assessment & Plan notes found for this encounter. Subjective:      Patient ID: Sandra Benson is a 35 y.o. female. Pt presents with left lateral rib discomfort. Onset about 2-3 weeks ago, Pt endorses she dopes carry her 1 y.o with LUE. Pt denies any breast pain, nipple change, nipple d/c, or lumps of left breast, recent or remote trauma to the left rib/breast area. Discomfort was reproducible with resistance of LUE. Pt also reports h/o wry neck. She has a chiropractor in [place] she is considering visiting for treatment. Pt is provided with neck ROM exercises. Pt declines muslce relaxer's. The following portions of the patient's history were reviewed and updated as appropriate:   She has a past medical history of Anemia, GERD (gastroesophageal reflux disease), Post partum depression (), and Rash of genital area. ,  does not have any pertinent problems on file. ,   has a past surgical history that includes Appendectomy;  section (); pr  delivery only (N/A, 2019); pr laps surg cholecystectomy w/cholangiography (N/A, 2020); and Cholecystectomy. ,  family history includes Greg's disease in her family and mother; No Known Problems in her brother, father, and sister. ,   reports that she quit smoking about 6 years ago. Her smoking use included cigarettes. She has a 5.00 pack-year smoking history. She has never used smokeless tobacco. She reports that she does not currently use drugs. She reports that she does not drink alcohol.,  has No Known Allergies. .  Current Outpatient Medications   Medication Sig Dispense Refill   • omeprazole (PriLOSEC) 40 MG capsule TAKE 1 CAP BY MOUTH 2 TIMES A DAY BEFORE MEALS 30MIN TO 1HR PRIOR TO BREAKFAST AND PRIOR TO DINNER 60 capsule 5     No current facility-administered medications for this visit. Review of Systems   Musculoskeletal: Positive for myalgias and neck pain. All other systems reviewed and are negative. Objective:  Vitals:    07/18/23 1439   BP: 122/78   Pulse: 95   Temp: (!) 96.7 °F (35.9 °C)   TempSrc: Tympanic   SpO2: 99%   Weight: 122 kg (268 lb 12.8 oz)   Height: 5' 6" (1.676 m)     Body mass index is 43.39 kg/m². Physical Exam  Vitals and nursing note reviewed. Constitutional:       Appearance: Normal appearance. She is well-developed. HENT:      Head: Normocephalic and atraumatic. Right Ear: Tympanic membrane, ear canal and external ear normal.      Left Ear: Tympanic membrane, ear canal and external ear normal.      Nose: Nose normal.      Mouth/Throat:      Mouth: Mucous membranes are moist.      Pharynx: Uvula midline. Eyes:      General: Lids are normal.      Conjunctiva/sclera: Conjunctivae normal.      Pupils: Pupils are equal, round, and reactive to light. Neck:      Thyroid: No thyroid mass or thyromegaly. Vascular: No JVD. Trachea: Trachea and phonation normal.     Cardiovascular:      Rate and Rhythm: Normal rate and regular rhythm. Pulses: Normal pulses. Heart sounds: Normal heart sounds, S1 normal and S2 normal. No murmur heard. No friction rub. No gallop. Pulmonary:      Effort: Pulmonary effort is normal.      Breath sounds: Normal breath sounds.    Chest:       Abdominal:      General: Bowel sounds are normal.      Palpations: Abdomen is soft.      Tenderness: There is no abdominal tenderness. Genitourinary:     Comments: Deferred   Musculoskeletal:         General: Normal range of motion. Cervical back: Full passive range of motion without pain, normal range of motion and neck supple. Right lower leg: No edema. Left lower leg: No edema. Lymphadenopathy:      Head:      Right side of head: No submental, submandibular, tonsillar, preauricular, posterior auricular or occipital adenopathy. Left side of head: No submental, submandibular, tonsillar, preauricular, posterior auricular or occipital adenopathy. Cervical: No cervical adenopathy. Skin:     General: Skin is warm and dry. Capillary Refill: Capillary refill takes less than 2 seconds. Neurological:      General: No focal deficit present. Mental Status: She is alert and oriented to person, place, and time. Cranial Nerves: No cranial nerve deficit. Sensory: Sensation is intact. Motor: Motor function is intact. Coordination: Coordination is intact. Gait: Gait is intact. Deep Tendon Reflexes: Reflexes are normal and symmetric. Psychiatric:         Attention and Perception: Attention and perception normal.         Mood and Affect: Mood and affect normal.         Speech: Speech normal.         Behavior: Behavior normal. Behavior is cooperative. Thought Content: Thought content normal.         Cognition and Memory: Cognition normal.         Judgment: Judgment normal.           Portions of the record may have been created with voice recognition software. Occasional wrong word or "sound a like" substitutions may have occurred due to the inherent limitations of voice recognition software. Read the chart carefully and recognize, using context, where substitutions have occurred. Contact me with any questions.

## 2023-08-08 DIAGNOSIS — K22.10 EROSIVE ESOPHAGITIS: ICD-10-CM

## 2023-08-08 RX ORDER — OMEPRAZOLE 40 MG/1
CAPSULE, DELAYED RELEASE ORAL
Qty: 180 CAPSULE | Refills: 2 | Status: SHIPPED | OUTPATIENT
Start: 2023-08-08

## 2023-12-11 ENCOUNTER — VBI (OUTPATIENT)
Dept: ADMINISTRATIVE | Facility: OTHER | Age: 33
End: 2023-12-11

## 2024-06-12 ENCOUNTER — RA CDI HCC (OUTPATIENT)
Dept: OTHER | Facility: HOSPITAL | Age: 34
End: 2024-06-12

## 2024-06-19 ENCOUNTER — OFFICE VISIT (OUTPATIENT)
Dept: FAMILY MEDICINE CLINIC | Facility: CLINIC | Age: 34
End: 2024-06-19
Payer: COMMERCIAL

## 2024-06-19 ENCOUNTER — LAB (OUTPATIENT)
Dept: LAB | Facility: CLINIC | Age: 34
End: 2024-06-19
Payer: COMMERCIAL

## 2024-06-19 VITALS
OXYGEN SATURATION: 97 % | SYSTOLIC BLOOD PRESSURE: 118 MMHG | HEIGHT: 66 IN | TEMPERATURE: 96.9 F | HEART RATE: 85 BPM | DIASTOLIC BLOOD PRESSURE: 78 MMHG | BODY MASS INDEX: 41.95 KG/M2 | WEIGHT: 261 LBS

## 2024-06-19 DIAGNOSIS — E66.01 CLASS 3 SEVERE OBESITY DUE TO EXCESS CALORIES WITHOUT SERIOUS COMORBIDITY WITH BODY MASS INDEX (BMI) OF 40.0 TO 44.9 IN ADULT (HCC): ICD-10-CM

## 2024-06-19 DIAGNOSIS — K21.9 GASTROESOPHAGEAL REFLUX DISEASE WITHOUT ESOPHAGITIS: ICD-10-CM

## 2024-06-19 DIAGNOSIS — G10 HUNTINGTON'S DISEASE (HCC): ICD-10-CM

## 2024-06-19 DIAGNOSIS — Z13.220 SCREENING FOR LIPID DISORDERS: ICD-10-CM

## 2024-06-19 DIAGNOSIS — Z13.0 SCREENING FOR DEFICIENCY ANEMIA: ICD-10-CM

## 2024-06-19 DIAGNOSIS — Z13.220 SCREENING, LIPID: ICD-10-CM

## 2024-06-19 DIAGNOSIS — Z00.00 ANNUAL PHYSICAL EXAM: Primary | ICD-10-CM

## 2024-06-19 DIAGNOSIS — Z82.0 FAMILY HISTORY OF HUNTINGTON'S DISEASE: ICD-10-CM

## 2024-06-19 DIAGNOSIS — Z13.29 SCREENING FOR THYROID DISORDER: ICD-10-CM

## 2024-06-19 DIAGNOSIS — Z13.1 SCREENING FOR DIABETES MELLITUS: ICD-10-CM

## 2024-06-19 DIAGNOSIS — Z13.31 DEPRESSION SCREENING NEGATIVE: ICD-10-CM

## 2024-06-19 LAB
ALBUMIN SERPL BCG-MCNC: 4.6 G/DL (ref 3.5–5)
ALP SERPL-CCNC: 53 U/L (ref 34–104)
ALT SERPL W P-5'-P-CCNC: 21 U/L (ref 7–52)
ANION GAP SERPL CALCULATED.3IONS-SCNC: 8 MMOL/L (ref 4–13)
AST SERPL W P-5'-P-CCNC: 19 U/L (ref 13–39)
BASOPHILS # BLD AUTO: 0.02 THOUSANDS/ÂΜL (ref 0–0.1)
BASOPHILS NFR BLD AUTO: 0 % (ref 0–1)
BILIRUB SERPL-MCNC: 0.4 MG/DL (ref 0.2–1)
BUN SERPL-MCNC: 15 MG/DL (ref 5–25)
CALCIUM SERPL-MCNC: 9.6 MG/DL (ref 8.4–10.2)
CHLORIDE SERPL-SCNC: 103 MMOL/L (ref 96–108)
CHOLEST SERPL-MCNC: 177 MG/DL
CO2 SERPL-SCNC: 28 MMOL/L (ref 21–32)
CREAT SERPL-MCNC: 0.73 MG/DL (ref 0.6–1.3)
EOSINOPHIL # BLD AUTO: 0.12 THOUSAND/ÂΜL (ref 0–0.61)
EOSINOPHIL NFR BLD AUTO: 2 % (ref 0–6)
ERYTHROCYTE [DISTWIDTH] IN BLOOD BY AUTOMATED COUNT: 13 % (ref 11.6–15.1)
GFR SERPL CREATININE-BSD FRML MDRD: 107 ML/MIN/1.73SQ M
GLUCOSE P FAST SERPL-MCNC: 88 MG/DL (ref 65–99)
HCT VFR BLD AUTO: 41.9 % (ref 34.8–46.1)
HDLC SERPL-MCNC: 56 MG/DL
HGB BLD-MCNC: 13.5 G/DL (ref 11.5–15.4)
IMM GRANULOCYTES # BLD AUTO: 0.02 THOUSAND/UL (ref 0–0.2)
IMM GRANULOCYTES NFR BLD AUTO: 0 % (ref 0–2)
LDLC SERPL CALC-MCNC: 105 MG/DL (ref 0–100)
LYMPHOCYTES # BLD AUTO: 1.72 THOUSANDS/ÂΜL (ref 0.6–4.47)
LYMPHOCYTES NFR BLD AUTO: 31 % (ref 14–44)
MCH RBC QN AUTO: 28 PG (ref 26.8–34.3)
MCHC RBC AUTO-ENTMCNC: 32.2 G/DL (ref 31.4–37.4)
MCV RBC AUTO: 87 FL (ref 82–98)
MONOCYTES # BLD AUTO: 0.42 THOUSAND/ÂΜL (ref 0.17–1.22)
MONOCYTES NFR BLD AUTO: 8 % (ref 4–12)
NEUTROPHILS # BLD AUTO: 3.18 THOUSANDS/ÂΜL (ref 1.85–7.62)
NEUTS SEG NFR BLD AUTO: 59 % (ref 43–75)
NRBC BLD AUTO-RTO: 0 /100 WBCS
PLATELET # BLD AUTO: 299 THOUSANDS/UL (ref 149–390)
PMV BLD AUTO: 9.3 FL (ref 8.9–12.7)
POTASSIUM SERPL-SCNC: 4.2 MMOL/L (ref 3.5–5.3)
PROT SERPL-MCNC: 7.7 G/DL (ref 6.4–8.4)
RBC # BLD AUTO: 4.82 MILLION/UL (ref 3.81–5.12)
SODIUM SERPL-SCNC: 139 MMOL/L (ref 135–147)
TRIGL SERPL-MCNC: 79 MG/DL
TSH SERPL DL<=0.05 MIU/L-ACNC: 1.25 UIU/ML (ref 0.45–4.5)
WBC # BLD AUTO: 5.48 THOUSAND/UL (ref 4.31–10.16)

## 2024-06-19 PROCEDURE — 36415 COLL VENOUS BLD VENIPUNCTURE: CPT

## 2024-06-19 PROCEDURE — 84443 ASSAY THYROID STIM HORMONE: CPT

## 2024-06-19 PROCEDURE — 85025 COMPLETE CBC W/AUTO DIFF WBC: CPT

## 2024-06-19 PROCEDURE — 80061 LIPID PANEL: CPT

## 2024-06-19 PROCEDURE — 99395 PREV VISIT EST AGE 18-39: CPT | Performed by: NURSE PRACTITIONER

## 2024-06-19 PROCEDURE — 80053 COMPREHEN METABOLIC PANEL: CPT

## 2024-06-19 NOTE — PROGRESS NOTES
Adult Annual Physical  Name: Andree Kwan      : 1990      MRN: [id number]  Encounter Provider: KANDY Donato  Encounter Date: 2024   Encounter department: [facility]    Assessment & Plan   1. Annual physical exam  2. Class 3 severe obesity due to excess calories without serious comorbidity with body mass index (BMI) of 40.0 to 44.9 in adult ([facility])  3. Gastroesophageal reflux disease without esophagitis  4. Screening for thyroid disorder  -     TSH, 3rd generation with Free T4 reflex; Future; Expected date: 2025  5. Screening for deficiency anemia  -     CBC and differential; Future; Expected date: 2025  6. Screening for diabetes mellitus  -     Comprehensive metabolic panel; Future; Expected date: 2025  7. Screening for lipid disorders  -     Lipid Panel with Direct LDL reflex; Future; Expected date: 2025  8. Lowndes's disease (HCC)  9. Depression screening negative  10. Family history of Greg's disease  Immunizations and preventive care screenings were discussed with patient today. Appropriate education was printed on patient's after visit summary.    Counseling:  Alcohol/drug use: discussed moderation in alcohol intake, the recommendations for healthy alcohol use, and avoidance of illicit drug use.  Dental Health: discussed importance of regular tooth brushing, flossing, and dental visits.  Injury prevention: discussed safety/seat belts, safety helmets, smoke detectors, carbon dioxide detectors, and smoking near bedding or upholstery.  Sexual health: discussed sexually transmitted diseases, partner selection, use of condoms, avoidance of unintended pregnancy, and contraceptive alternatives.  Exercise: the importance of regular exercise/physical activity was discussed. Recommend exercise 3-5 times per week for at least 30 minutes.       Depression Screening and Follow-up Plan: Patient was screened for depression during  today's encounter. They screened negative with a PHQ-9 score of 0.        History of Present Illness   {Disappearing Hyperlinks I Encounters * My Last Note * Since Last Visit * History :44918}    Patient has a history of Greg's disease he has been asymptomatic for several years.  Of recent she has had more symptoms and is following Dr. Thomas Yeboah and pending medicine in [place].  Has an upcoming gastroenterology appointment in July for follow-up of GERD.  She states that the Prilosec has been very effective.  Patient reports that she has ear popping and tinnitus bilaterally.  Discussed causes of tinnitus, over-the-counter treatments and avoidance of loud sounds.  Otherwise patient is doing well has no other acute or interval issues to discuss    Heartburn  This is a chronic problem. The problem occurs frequently. The symptoms are aggravated by certain foods. Pertinent negatives include no anemia, fatigue, melena, muscle weakness, orthopnea or weight loss. Risk factors include obesity. She has tried a PPI for the symptoms. The treatment provided significant relief.       Adult Annual Physical:  Patient presents for annual physical.     Diet and Physical Activity:  - Diet/Nutrition: well balanced diet.    Depression Screening:    - PHQ-9 Score: 0    General Health:  - Sleep: sleeps well.  - Hearing: normal hearing bilateral ears.  - Vision: wears contacts.  - Dental: regular dental visits.    /GYN Health:  - Follows with GYN: yes.     Review of Systems   Constitutional:  Negative for fatigue and weight loss.   Gastrointestinal:  Negative for melena.   Musculoskeletal:  Negative for muscle weakness.   All other systems reviewed and are negative.    Medical History Reviewed by provider this encounter:  Tobacco  Allergies  Meds  Problems  Med Hx  Surg Hx  Fam Hx       Past Medical History   Past Medical History:   Diagnosis Date   • Anemia    • GERD (gastroesophageal reflux disease)    •  Parlier's disease (HCC) 2022   • Post partum depression    • Rash of genital area     started a few weeks ago, red     Past Surgical History:   Procedure Laterality Date   • APPENDECTOMY     •  SECTION     • CHOLECYSTECTOMY     • CO  DELIVERY ONLY N/A 2019    Procedure:  SECTION () REPEAT;  Surgeon: Neymar Vaca MD;  [facility]: [facility];  Service: Obstetrics   • CO LAPS SURG CHOLECYSTECTOMY W/CHOLANGIOGRAPHY N/A 2020    Procedure: LAPAROSCOPIC CHOLECYSTECTOMY WITH INTRAOP CHOLANGIOGRAM;  Surgeon: Hussain Grove MD;  Location: [facility];  Service: General     Family History   Problem Relation Age of Onset   • Parlier's disease Family    • Greg's disease Mother    • No Known Problems Father    • No Known Problems Sister    • No Known Problems Brother      Current Outpatient Medications on File Prior to Visit   Medication Sig Dispense Refill   • omeprazole (PriLOSEC) 40 MG capsule TAKE 1 CAP BY MOUTH 2 TIMES A DAY BEFORE MEALS 30MIN TO 1HR PRIOR TO BREAKFAST AND PRIOR TO DINNER 180 capsule 2     No current facility-administered medications on file prior to visit.   No Known Allergies   Current Outpatient Medications on File Prior to Visit   Medication Sig Dispense Refill   • omeprazole (PriLOSEC) 40 MG capsule TAKE 1 CAP BY MOUTH 2 TIMES A DAY BEFORE MEALS 30MIN TO 1HR PRIOR TO BREAKFAST AND PRIOR TO DINNER 180 capsule 2     No current facility-administered medications on file prior to visit.      Social History     Tobacco Use   • Smoking status: Former     Current packs/day: 0.00     Average packs/day: 1 pack/day for 5.0 years (5.0 ttl pk-yrs)     Types: Cigarettes     Start date: 2012     Quit date: 2017     Years since quittin.9   • Smokeless tobacco: Never   Vaping Use   • Vaping status: Never Used   Substance and Sexual Activity   • Alcohol use: No   • Drug use: Not Currently   • Sexual activity: Yes       Objective  "  {Disappearing Hyperlinks   Review Vitals * Enter New Vitals * Results Review * Labs * Imaging * Cardiology * Procedures * Lung Cancer Screening :06239}  /78   Pulse 85   Temp (!) 96.9 °F (36.1 °C) (Tympanic)   Ht 5' 6\" (1.676 m)   Wt 118 kg (261 lb)   SpO2 97%   BMI 42.13 kg/m²     Physical Exam  Vitals and nursing note reviewed.   Constitutional:       Appearance: She is well-developed. She is obese.   HENT:      Head: Normocephalic and atraumatic.      Right Ear: Tympanic membrane, ear canal and external ear normal.      Left Ear: Tympanic membrane, ear canal and external ear normal.      Nose: Nose normal.      Mouth/Throat:      Mouth: Mucous membranes are moist.   Eyes:      General: Lids are normal. Vision grossly intact.      Conjunctiva/sclera: Conjunctivae normal.      Pupils: Pupils are equal, round, and reactive to light.   Cardiovascular:      Rate and Rhythm: Normal rate and regular rhythm.      Pulses: Normal pulses.      Heart sounds: Normal heart sounds, S1 normal and S2 normal.      No friction rub. No gallop. No S3 sounds.   Pulmonary:      Effort: Pulmonary effort is normal.      Breath sounds: Normal breath sounds.   Abdominal:      General: Bowel sounds are normal.      Palpations: Abdomen is soft.      Tenderness: There is no abdominal tenderness.   Genitourinary:     Comments: Deferred  Musculoskeletal:         General: Normal range of motion.      Cervical back: Normal range of motion and neck supple.      Right lower leg: No edema.      Left lower leg: No edema.   Lymphadenopathy:      Cervical: No cervical adenopathy.   Skin:     General: Skin is warm and dry.      Capillary Refill: Capillary refill takes less than 2 seconds.   Neurological:      General: No focal deficit present.      Mental Status: She is alert and oriented to person, place, and time.      Motor: Motor function is intact.      Gait: Gait is intact.   Psychiatric:         Attention and Perception: Attention " and perception normal.         Mood and Affect: Mood and affect normal.         Speech: Speech normal.         Behavior: Behavior normal. Behavior is cooperative.         Thought Content: Thought content normal.         Cognition and Memory: Cognition and memory normal.         Judgment: Judgment normal.       Administrative Statements {Disappearing Hyperlinks I  [facility] Service [facility]}

## 2024-07-08 ENCOUNTER — DOCUMENTATION (OUTPATIENT)
Dept: GASTROENTEROLOGY | Facility: CLINIC | Age: 34
End: 2024-07-08

## 2024-07-08 ENCOUNTER — TELEPHONE (OUTPATIENT)
Dept: GASTROENTEROLOGY | Facility: CLINIC | Age: 34
End: 2024-07-08

## 2024-07-08 ENCOUNTER — OFFICE VISIT (OUTPATIENT)
Dept: GASTROENTEROLOGY | Facility: CLINIC | Age: 34
End: 2024-07-08
Payer: COMMERCIAL

## 2024-07-08 VITALS
OXYGEN SATURATION: 98 % | BODY MASS INDEX: 41.14 KG/M2 | RESPIRATION RATE: 18 BRPM | SYSTOLIC BLOOD PRESSURE: 136 MMHG | HEIGHT: 66 IN | WEIGHT: 256 LBS | DIASTOLIC BLOOD PRESSURE: 76 MMHG

## 2024-07-08 DIAGNOSIS — R10.13 EPIGASTRIC DISCOMFORT: ICD-10-CM

## 2024-07-08 DIAGNOSIS — E66.01 CLASS 3 SEVERE OBESITY DUE TO EXCESS CALORIES WITHOUT SERIOUS COMORBIDITY WITH BODY MASS INDEX (BMI) OF 40.0 TO 44.9 IN ADULT (HCC): ICD-10-CM

## 2024-07-08 DIAGNOSIS — K21.00 GASTROESOPHAGEAL REFLUX DISEASE WITH ESOPHAGITIS WITHOUT HEMORRHAGE: Primary | ICD-10-CM

## 2024-07-08 DIAGNOSIS — R10.13 DYSPEPSIA: ICD-10-CM

## 2024-07-08 DIAGNOSIS — K22.9 SUBEPITHELIAL LESION OF ESOPHAGUS: ICD-10-CM

## 2024-07-08 DIAGNOSIS — K44.9 HIATAL HERNIA: ICD-10-CM

## 2024-07-08 DIAGNOSIS — K22.70 BARRETT'S ESOPHAGUS WITHOUT DYSPLASIA: ICD-10-CM

## 2024-07-08 PROCEDURE — 99215 OFFICE O/P EST HI 40 MIN: CPT | Performed by: INTERNAL MEDICINE

## 2024-07-08 RX ORDER — FAMOTIDINE 40 MG/1
40 TABLET, FILM COATED ORAL
Qty: 90 TABLET | Refills: 3 | Status: SHIPPED | OUTPATIENT
Start: 2024-07-08

## 2024-07-08 NOTE — ASSESSMENT & PLAN NOTE
Anrdee does have a history of GERD with grade C erosive esophagitis back in May 2021.  Her omeprazole was increased to 40 mg daily follow-up EGD in August 2021 revealed grade B erosive esophagitis at which point her omeprazole was increased to 40 mg twice a day and in December 2021 her esophagitis was healed.  She has had some insurance issues and therefore could not maintain the omeprazole 40 mg twice a day for several months.  She recently last couple months resumed 40 mg twice a day.  At this point she does have some epigastric burning and occasional tightness.  The exact cause for this symptom is not entirely clear.  For now she should continue omeprazole 40 mg twice a day Best to take 30 minutes to 1 hour before breakfast and 30 minutes 1 hour before dinner.  I will add famotidine 40 mg 2 hours before bed.  Lifestyle modifications for gastroesophageal reflux disease were discussed and include limiting fried and fatty foods, mints, chocolates, carbonated and caffeinated beverages , and alcohol, etc.  Avoid lying down for 2-3 hours after meals.  If you have nighttime symptoms consider raising the head of the bed up on 4-6 inch blocks.  Pillows typically are not useful.  If you are overweight, weight loss will be helpful.

## 2024-07-08 NOTE — TELEPHONE ENCOUNTER
Pt called back. Had prev sched apt to be seen, advised pt of change of care policy. Pt cancelled apt w/Dr. Vegas and Dr. Burt, sched for apt w/Dr. Narayan today.

## 2024-07-08 NOTE — ASSESSMENT & PLAN NOTE
Andree's body mass index is 41.32.  She feels that her weight is limiting her ability to exercise.  She is interested in weight management consultation.  Will place a consultation for weight management for further evaluation and consideration towards various treatment options including GLP-1 agonist if she is a candidate.

## 2024-07-08 NOTE — ASSESSMENT & PLAN NOTE
I did note a 2 cm sliding hiatal hernia with possible very small paraesophageal component at the time of her last EGD.  This will be reevaluated at time of her upcoming EGD.

## 2024-07-08 NOTE — ASSESSMENT & PLAN NOTE
At time of her last endoscopy I raised the possibility of a prominent fold versus submucosal lesion at the GE junction at 34 cm.  This was soft when probed.  It was not biopsied.  I did recommend consideration towards endoscopic ultrasound at that time.  Patient will be scheduled for upper endoscopy to follow-up on erosive esophagitis.  I will reevaluate this area at that time.  If there is a persistent subepithelial lesion I will recommend that she pursue EGD with endoscopic ultrasound.

## 2024-07-08 NOTE — PROGRESS NOTES
Records reviewed and outlined below in preparation for office visit today    6/19/2024 laboratory data  Sodium 139  Potassium 4.2  BUN 50  Creatinine 0.73  Glucose 88  AST 19  ALT 21  Albumin 4.6  WBC 5.48 Hgb 13.5 HCT 41.9 MCV 87 platelet count turned 99,000    12/13/2021 EGD    Normal duodenum to 2nd portion  Mild antral erythema.  Biopsy negative for H. pylori  2 cm sliding hiatal hernia with possible very small paraesophageal component  Irregular Z-line at 34 cm with 2 short tongues of salmon-colored mucosa status post biopsy rule out Warren's esophagus.  Biopsy distal esophagus did reveal mild subacute and chronic inflammation.  Eosinophils focally number up to 20 per high-power field.  Small focus equivocal for intestinal metaplasia  Prominent fold versus submucosal lesion at the GE junction at 34 cm.  This was soft when probed.  Not biopsied   Mildly torturous distal 3rd of the esophagus  Continue omeprazole 40 mg twice a day.  Follow-up in 3 months  Consider upper GI examination for further evaluation if symptoms persist  EGD with endoscopic ultrasound nonurgently recommended to evaluate submucosal lesion distal esophagus    8/16/2021 EGD    Normal 2nd portion duodenum  Few small erosions duodenal bulb  Mild antral erythema status post biopsy antrum and body of stomach to rule out H pylori.  Biopsy negative for H. pylori.  2-3 cm sliding hiatal hernia  Mildly prominent/nodular fold at GE junction likely inflammatory status post biopsy.  Biopsy revealed benign squamous mucosa with chronic inflammation and reactive epithelial changes.  Negative for eosinophilic esophagitis  LA grade B erosive esophagitis  Omeprazole was increased to 40 mg twice a day at this time.    5/10/2021 EGD  Normal duodenum to the 2nd portion status post biopsy rule out celiac disease.  Biopsy negative for celiac disease.  Mild patchy antral erythema status post biopsy rule out H pylori.  Biopsy revealed mildly chronic inactive  gastritis  Small 2 cm the 3 cm sliding hiatal hernia  LA grade C erosive esophagitis located 33 cm  Irregular Z-line at 33 cm    Omeprazole was increased to 40 mg daily at that time

## 2024-07-08 NOTE — ASSESSMENT & PLAN NOTE
Biopsy from an irregular Z-line at 34 cm with 2 short tongues of stomach a mucosa did reveal mild subacute and chronic inflammation.  Up to 20 eosinophils per high-power field.  There is a small focus equivocal for intestinal metaplasia.  This area will be reevaluated and biopsied if appears abnormal at time of repeat EGD.  Whether or not it truly represents Warren's esophagus is not clear.  Repeat biopsies will help delineate this.

## 2024-07-08 NOTE — TELEPHONE ENCOUNTER
Pt is scheduled for GI office visit with Dr. Vegas on 7/10/24 via [employer]. Attempted to call pt to find out reason for the appt. No answer.

## 2024-07-08 NOTE — ASSESSMENT & PLAN NOTE
Patient describes this bloated full feeling especially after eating.  And she describes a Lave/burning sensation in the epigastric region.  I would describe this as more as dyspeptic symptoms.  This could be related to dietary indiscretion and intolerance.  For now I would like her to stop the use of all artificial sweeteners.  Stop drinking soda which she has cut back on considerably.  Stop all dairy products for maybe 4 to 6 weeks and use Lactaid milk.  She does use a lot of creamer in her coffee and did recommend that she lean towards more of Lactaid creamer or milk in her coffee.

## 2024-07-08 NOTE — ASSESSMENT & PLAN NOTE
Andree does report an epigastric tight feeling that will occur maybe 1 time a week.  This may last 5 to 10 minutes and go away.  She does not believe it is associated with the eating and there is no associated nausea or vomiting.  The exact cause for this is not clear.  She is already had a cholecystectomy.  She does not feel that this symptom is similar or reminiscent of her gallbladder attacks.  For completeness recommend checking ultrasound of the abdomen.  I doubt this is related to her hiatal hernia.  I did raise the possibility of a paraesophageal component to her hiatal hernia however if this was hiatal hernia related I would expect it to be associated with pain with eating and nausea and vomiting.

## 2024-07-08 NOTE — PROGRESS NOTES
[facility] Gastroenterology  Gastroenterology Outpatient consultaion  Patient Andree Kwan   Age 34 y.o.   Gender female   MRN: [id number]  [facility] [id number]     ASSESSMENT AND PLAN:   Problem List Items Addressed This Visit          Respiratory    Hiatal hernia     I did note a 2 cm sliding hiatal hernia with possible very small paraesophageal component at the time of her last EGD.  This will be reevaluated at time of her upcoming EGD.         Relevant Medications    famotidine (PEPCID) 40 MG tablet       Digestive    Gastroesophageal reflux disease - Primary     Andree does have a history of GERD with grade C erosive esophagitis back in May 2021.  Her omeprazole was increased to 40 mg daily follow-up EGD in August 2021 revealed grade B erosive esophagitis at which point her omeprazole was increased to 40 mg twice a day and in December 2021 her esophagitis was healed.  She has had some insurance issues and therefore could not maintain the omeprazole 40 mg twice a day for several months.  She recently last couple months resumed 40 mg twice a day.  At this point she does have some epigastric burning and occasional tightness.  The exact cause for this symptom is not entirely clear.  For now she should continue omeprazole 40 mg twice a day Best to take 30 minutes to 1 hour before breakfast and 30 minutes 1 hour before dinner.  I will add famotidine 40 mg 2 hours before bed.  Lifestyle modifications for gastroesophageal reflux disease were discussed and include limiting fried and fatty foods, mints, chocolates, carbonated and caffeinated beverages , and alcohol, etc.  Avoid lying down for 2-3 hours after meals.  If you have nighttime symptoms consider raising the head of the bed up on 4-6 inch blocks.  Pillows typically are not useful.  If you are overweight, weight loss will be helpful.           Relevant Medications    famotidine (PEPCID) 40 MG tablet    Other Relevant Orders    EGD    Subepithelial lesion of  esophagus     At time of her last endoscopy I raised the possibility of a prominent fold versus submucosal lesion at the GE junction at 34 cm.  This was soft when probed.  It was not biopsied.  I did recommend consideration towards endoscopic ultrasound at that time.  Patient will be scheduled for upper endoscopy to follow-up on erosive esophagitis.  I will reevaluate this area at that time.  If there is a persistent subepithelial lesion I will recommend that she pursue EGD with endoscopic ultrasound.         Relevant Medications    famotidine (PEPCID) 40 MG tablet    Other Relevant Orders    EGD    Warren's esophagus     Biopsy from an irregular Z-line at 34 cm with 2 short tongues of stomach a mucosa did reveal mild subacute and chronic inflammation.  Up to 20 eosinophils per high-power field.  There is a small focus equivocal for intestinal metaplasia.  This area will be reevaluated and biopsied if appears abnormal at time of repeat EGD.  Whether or not it truly represents Warren's esophagus is not clear.  Repeat biopsies will help delineate this.         Relevant Medications    famotidine (PEPCID) 40 MG tablet       Neurology/Sleep    Epigastric discomfort     Andree does report an epigastric tight feeling that will occur maybe 1 time a week.  This may last 5 to 10 minutes and go away.  She does not believe it is associated with the eating and there is no associated nausea or vomiting.  The exact cause for this is not clear.  She is already had a cholecystectomy.  She does not feel that this symptom is similar or reminiscent of her gallbladder attacks.  For completeness recommend checking ultrasound of the abdomen.  I doubt this is related to her hiatal hernia.  I did raise the possibility of a paraesophageal component to her hiatal hernia however if this was hiatal hernia related I would expect it to be associated with pain with eating and nausea and vomiting.           Relevant Medications    famotidine  (PEPCID) 40 MG tablet    Other Relevant Orders    US abdomen complete    EGD       Other    Class 3 severe obesity due to excess calories in adult ([facility])     Andree's body mass index is 41.32.  She feels that her weight is limiting her ability to exercise.  She is interested in weight management consultation.  Will place a consultation for weight management for further evaluation and consideration towards various treatment options including GLP-1 agonist if she is a candidate.         Relevant Orders    Ambulatory Referral to Weight Management    Dyspepsia     Patient describes this bloated full feeling especially after eating.  And she describes a Lave/burning sensation in the epigastric region.  I would describe this as more as dyspeptic symptoms.  This could be related to dietary indiscretion and intolerance.  For now I would like her to stop the use of all artificial sweeteners.  Stop drinking soda which she has cut back on considerably.  Stop all dairy products for maybe 4 to 6 weeks and use Lactaid milk.  She does use a lot of creamer in her coffee and did recommend that she lean towards more of Lactaid creamer or milk in her coffee.               _____________________________________________________________    HPI:   Andree is a delightful 34-year-old woman who I am seeing in the office.  I have not seen her in the office since August 2020 and her last endoscopy was December 2021.  She has a history of LA grade C erosive esophagitis that persisted despite omeprazole 40 mg once a day and was found to be healed on omeprazole 40 mg twice a day.  She had some insurance issues and had to conserve her omeprazole and had been using 40 mg once a day for several months up until about 2 or 3 months ago she resumed taking this twice a day.  With the 40 mg once a day she did have burning in the back of her throat.  Now that she is on 40 mg twice a day the acid reflux and burning in her throat has resolved.  She  however describes a burning or lava feeling in her stomach.  She points to her upper abdomen.  This seems to come and go.  When she notices this lava/burning feeling in the abdomen she does not typically eat.  She cannot really say if eating makes it better.  She does not believe that eating makes it worse.  She also feels very bloated.  She denies any dysphagia.  There is been no unintentional weight loss.  She does have occasional nausea but no vomiting.    Her bowel habits been very regular denies any diarrhea, constipation, bleeding or black stools.  There is been no overall change in her bowel pattern.    She does feel that she can hear a lot of gurgling in her abdomen associated with the gas.  She does have maybe 2 to 3 cups of coffee per day she does use a lot of creamer in her coffee.  She does not drink much a lot of soda, she cut back on this considerably.  Soda does not make her feel very good.  She also describes an epigastric pain, described as a tight feeling.  It this does not radiate through to her back.  Does not feel like a gallbladder attack.  It would last 5 to 10 minutes and eventually go away.  She may notice this about 1 time per week.  She has not been able to identify any particular triggers.  She does not feel that it is food related.  And there is no associated nausea or vomiting when this occurs.  There is no associated shortness of breath.  This does not appear to occur with activity.    She does not smoke tobacco and does not drink alcohol.  She does not use marijuana products.    There is no family history colon cancer, colon polyps, esophageal or gastric cancer.    Records reviewed for 15 minutes prior to office visit      6/19/2024 laboratory data  Sodium 139  Potassium 4.2  BUN 50  Creatinine 0.73  Glucose 88  AST 19  ALT 21  Albumin 4.6  WBC 5.48 Hgb 13.5 HCT 41.9 MCV 87 platelet count turned 299,000    12/13/2021 EGD    Normal duodenum to 2nd portion  Mild antral erythema.  Biopsy  negative for H. pylori  2 cm sliding hiatal hernia with possible very small paraesophageal component  Irregular Z-line at 34 cm with 2 short tongues of salmon-colored mucosa status post biopsy rule out Warren's esophagus.  Biopsy distal esophagus did reveal mild subacute and chronic inflammation.  Eosinophils focally number up to 20 per high-power field.  Small focus equivocal for intestinal metaplasia  Prominent fold versus submucosal lesion at the GE junction at 34 cm.  This was soft when probed.  Not biopsied   Mildly torturous distal 3rd of the esophagus  Continue omeprazole 40 mg twice a day.  Follow-up in 3 months  Consider upper GI examination for further evaluation if symptoms persist  EGD with endoscopic ultrasound nonurgently recommended to evaluate submucosal lesion distal esophagus    8/16/2021 EGD    Normal 2nd portion duodenum  Few small erosions duodenal bulb  Mild antral erythema status post biopsy antrum and body of stomach to rule out H pylori.  Biopsy negative for H. pylori.  2-3 cm sliding hiatal hernia  Mildly prominent/nodular fold at GE junction likely inflammatory status post biopsy.  Biopsy revealed benign squamous mucosa with chronic inflammation and reactive epithelial changes.  Negative for eosinophilic esophagitis  LA grade B erosive esophagitis  Omeprazole was increased to 40 mg twice a day at this time.    5/10/2021 EGD  Normal duodenum to the 2nd portion status post biopsy rule out celiac disease.  Biopsy negative for celiac disease.  Mild patchy antral erythema status post biopsy rule out H pylori.  Biopsy revealed mildly chronic inactive gastritis  Small 2 cm the 3 cm sliding hiatal hernia  LA grade C erosive esophagitis located 33 cm  Irregular Z-line at 33 cm     Omeprazole was increased to 40 mg daily at that time    No Known Allergies  Current Outpatient Medications   Medication Sig Dispense Refill    famotidine (PEPCID) 40 MG tablet Take 1 tablet (40 mg total) by mouth daily  at bedtime Take in evening 2 hours prior to bed 90 tablet 3    omeprazole (PriLOSEC) 40 MG capsule TAKE 1 CAP BY MOUTH 2 TIMES A DAY BEFORE MEALS 30MIN TO 1HR PRIOR TO BREAKFAST AND PRIOR TO DINNER 180 capsule 2     No current facility-administered medications for this visit.     MEDICAL HISTORY:  Past Medical History:   Diagnosis Date    Anemia     GERD (gastroesophageal reflux disease)     Greg's disease (HCC) 2022    Post partum depression     Rash of genital area     started a few weeks ago, red     Past Surgical History:   Procedure Laterality Date    APPENDECTOMY       SECTION  2013    CHOLECYSTECTOMY      SD  DELIVERY ONLY N/A 2019    Procedure:  SECTION () REPEAT;  Surgeon: Neymar Vaca MD;  Location: [facility];  Service: Obstetrics    SD LAPS SURG CHOLECYSTECTOMY W/CHOLANGIOGRAPHY N/A 2020    Procedure: LAPAROSCOPIC CHOLECYSTECTOMY WITH INTRAOP CHOLANGIOGRAM;  Surgeon: Hussain Grove MD;  Location: [facility] OR;  Service: General     Social History     Substance and Sexual Activity   Alcohol Use No     Social History     Substance and Sexual Activity   Drug Use Not Currently     Social History     Tobacco Use   Smoking Status Former    Current packs/day: 0.00    Average packs/day: 1 pack/day for 5.0 years (5.0 ttl pk-yrs)    Types: Cigarettes    Start date: 2012    Quit date: 2017    Years since quittin.9   Smokeless Tobacco Never     Family History   Problem Relation Age of Onset    Trezevant's disease Family     Trezevant's disease Mother     No Known Problems Father     No Known Problems Sister     No Known Problems Brother        REVIEW OF SYSTEMS:  CONSTITUTIONAL: Denies any fever, chills, rigors, and weight loss.  HEENT: No earache or tinnitus. Denies hearing loss or visual disturbances.  CARDIOVASCULAR: No chest pain or palpitations.   RESPIRATORY: Denies any cough, hemoptysis, shortness of breath or dyspnea on  "exertion.  GASTROINTESTINAL: As noted in the History of Present Illness.   GENITOURINARY: No problems with urination. Denies any hematuria or dysuria.  NEUROLOGIC: No dizziness or vertigo, denies headaches.  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  She looked into this as her grandmother and mother both had it.  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  MUSCULOSKELETAL: Denies any muscle or joint pain.   SKIN: Denies skin rashes or itching.   ENDOCRINE: Denies excessive thirst. Denies intolerance to heat or cold.  PSYCHOSOCIAL: Denies depression or anxiety. Denies any recent memory loss.     Objective   Blood pressure 136/76, resp. rate 18, height 5' 6\" (1.676 m), weight 116 kg (256 lb), SpO2 98%, not currently breastfeeding. Body mass index is 41.32 kg/m².    PHYSICAL EXAM:   General Appearance: Alert, cooperative, no distress  HEENT: Normocephalic, atraumatic, anicteric.   Neck: Supple, symmetrical, trachea midline  Lungs: Clear to auscultation bilaterally; no rales, rhonchi or wheezing; respirations unlabored   Heart: Regular rate and rhythm; no murmur, rub, or gallop.  Abdomen: Soft, bowel sounds normal, non-tender, non-distended; no masses, there is no hepatosplenomegaly. No spider angiomas  Genitalia: Deferred   Rectal: Deferred   Extremities: No cyanosis, clubbing or edema   Skin: No jaundice, rashes, or lesions   Lymph nodes: No palpable cervical lymphadenopathy   Lab Results:   Lab on 06/19/2024   Component Date Value    WBC 06/19/2024 5.48     RBC 06/19/2024 4.82     Hemoglobin 06/19/2024 13.5     Hematocrit 06/19/2024 41.9     MCV 06/19/2024 87     MCH 06/19/2024 28.0     MCHC 06/19/2024 32.2     RDW 06/19/2024 13.0     MPV 06/19/2024 9.3     Platelets 06/19/2024 299     nRBC 06/19/2024 0     Segmented % 06/19/2024 59     Immature Grans % 06/19/2024 0     Lymphocytes % 06/19/2024 31     Monocytes % 06/19/2024 8     Eosinophils Relative 06/19/2024 2     Basophils Relative " 06/19/2024 0     Absolute Neutrophils 06/19/2024 3.18     Absolute Immature Grans 06/19/2024 0.02     Absolute Lymphocytes 06/19/2024 1.72     Absolute Monocytes 06/19/2024 0.42     Eosinophils Absolute 06/19/2024 0.12     Basophils Absolute 06/19/2024 0.02     Cholesterol 06/19/2024 177     Triglycerides 06/19/2024 79     HDL, Direct 06/19/2024 56     LDL Calculated 06/19/2024 105 (H)     Sodium 06/19/2024 139     Potassium 06/19/2024 4.2     Chloride 06/19/2024 103     CO2 06/19/2024 28     ANION GAP 06/19/2024 8     BUN 06/19/2024 15     Creatinine 06/19/2024 0.73     Glucose, Fasting 06/19/2024 88     Calcium 06/19/2024 9.6     AST 06/19/2024 19     ALT 06/19/2024 21     Alkaline Phosphatase 06/19/2024 53     Total Protein 06/19/2024 7.7     Albumin 06/19/2024 4.6     Total Bilirubin 06/19/2024 0.40     eGFR 06/19/2024 107     TSH 3RD GENERATON 06/19/2024 1.254      Radiology Results:   I have spent a total time of 51 minutes in caring for this patient on the day of the visit/encounter including Instructions for management, Importance of tx compliance, Risk factor reductions, Impressions, Counseling / Coordination of care, Documenting in the medical record, Reviewing / ordering tests, medicine, procedures  , and Obtaining or reviewing history  .   No results found.  Stanley Narayan DO   07/08/24   Cc:

## 2024-07-15 ENCOUNTER — HOSPITAL ENCOUNTER (OUTPATIENT)
Dept: ULTRASOUND IMAGING | Facility: HOSPITAL | Age: 34
Discharge: HOME | End: 2024-07-15
Attending: INTERNAL MEDICINE
Payer: COMMERCIAL

## 2024-07-15 DIAGNOSIS — R10.13 EPIGASTRIC DISCOMFORT: ICD-10-CM

## 2024-07-15 PROCEDURE — 76700 US EXAM ABDOM COMPLETE: CPT

## 2024-07-18 ENCOUNTER — TELEPHONE (OUTPATIENT)
Age: 34
End: 2024-07-18

## 2024-07-18 ENCOUNTER — NURSE TRIAGE (OUTPATIENT)
Age: 34
End: 2024-07-18

## 2024-07-18 NOTE — RESULT ENCOUNTER NOTE
I sent patient a my chart message stating that;    US of the liver and bile ducts are OK. The radiologists is reporting a prominent medulary sinus in both kidney's, to be honest I am not sure of the significance of this finding and if you don't mind please check with Leland Ponce regarding this finding.  The radiologist is suggesting a CT with out IV contrast to evaluate this further.  You could also see a nephrologists.  Good news is the the liver , spleen and bile ducts all look OK

## 2024-07-18 NOTE — TELEPHONE ENCOUNTER
Patient calling in for her US results.  Please review and advise.       Reason for Disposition   Information only question and nurse able to answer    Answer Assessment - Initial Assessment Questions  1. REASON FOR CALL or QUESTION: Patient calling in for her US results.  Please review and advise.    Protocols used: Information Only Call - No Triage-ADULT-[place]

## 2024-08-12 RX ORDER — SODIUM CHLORIDE, SODIUM LACTATE, POTASSIUM CHLORIDE, CALCIUM CHLORIDE 600; 310; 30; 20 MG/100ML; MG/100ML; MG/100ML; MG/100ML
125 INJECTION, SOLUTION INTRAVENOUS CONTINUOUS
OUTPATIENT
Start: 2024-08-12

## 2024-08-12 RX ORDER — LIDOCAINE HYDROCHLORIDE 10 MG/ML
0.5 INJECTION, SOLUTION EPIDURAL; INFILTRATION; INTRACAUDAL; PERINEURAL ONCE AS NEEDED
OUTPATIENT
Start: 2024-08-12

## 2024-08-13 ENCOUNTER — TELEPHONE (OUTPATIENT)
Age: 34
End: 2024-08-13

## 2024-08-13 NOTE — TELEPHONE ENCOUNTER
Rescheduled date of EGD(as of today): 10/02/2024  Physician performing EGD: DR BELTRAN  Location of EGD: [place]  Instructions reviewed with patient by: Previously reviewed with pt.  Clearances:

## 2024-09-25 ENCOUNTER — TELEPHONE (OUTPATIENT)
Dept: ENDOCRINOLOGY | Facility: CLINIC | Age: 34
End: 2024-09-25

## 2024-11-08 DIAGNOSIS — K22.10 EROSIVE ESOPHAGITIS: ICD-10-CM

## 2024-11-08 RX ORDER — OMEPRAZOLE 40 MG/1
40 CAPSULE, DELAYED RELEASE ORAL DAILY
Qty: 60 CAPSULE | Refills: 5 | Status: SHIPPED | OUTPATIENT
Start: 2024-11-08

## 2024-11-08 NOTE — TELEPHONE ENCOUNTER
Reason for call:   [x] Refill   [] Prior Auth  [] Other:     Office:   [] PCP/Provider -   [x] Specialty/Provider - gastro, [place]    Medication:   Omeprazole 40 mg, 1 bid, 60      Pharmacy:   [facility]    Does the patient have enough for 3 days?   [] Yes   [x] No - Send as HP to POD

## 2024-11-15 ENCOUNTER — OFFICE VISIT (OUTPATIENT)
Dept: URGENT CARE | Facility: CLINIC | Age: 34
End: 2024-11-15
Payer: COMMERCIAL

## 2024-11-15 ENCOUNTER — RESULTS FOLLOW-UP (OUTPATIENT)
Dept: URGENT CARE | Facility: CLINIC | Age: 34
End: 2024-11-15

## 2024-11-15 ENCOUNTER — APPOINTMENT (OUTPATIENT)
Dept: RADIOLOGY | Facility: CLINIC | Age: 34
End: 2024-11-15
Payer: COMMERCIAL

## 2024-11-15 VITALS
SYSTOLIC BLOOD PRESSURE: 124 MMHG | BODY MASS INDEX: 40.18 KG/M2 | WEIGHT: 250 LBS | HEART RATE: 78 BPM | RESPIRATION RATE: 18 BRPM | OXYGEN SATURATION: 100 % | TEMPERATURE: 98.2 F | DIASTOLIC BLOOD PRESSURE: 70 MMHG | HEIGHT: 66 IN

## 2024-11-15 DIAGNOSIS — M25.531 ACUTE PAIN OF RIGHT WRIST: ICD-10-CM

## 2024-11-15 DIAGNOSIS — S66.911A WRIST STRAIN, RIGHT, INITIAL ENCOUNTER: Primary | ICD-10-CM

## 2024-11-15 PROCEDURE — 99213 OFFICE O/P EST LOW 20 MIN: CPT | Performed by: NURSE PRACTITIONER

## 2024-11-15 PROCEDURE — 73110 X-RAY EXAM OF WRIST: CPT

## 2024-11-15 PROCEDURE — S9088 SERVICES PROVIDED IN URGENT: HCPCS | Performed by: NURSE PRACTITIONER

## 2024-11-15 NOTE — RESULT ENCOUNTER NOTE
Called and discussed final right wrist xray read.  She states that is the area where she seems to be more tender. Instructed pt to follow up with ortho as she may have aggravated the cyst playing volleyball  she verbalizes understanding.

## 2024-11-15 NOTE — PATIENT INSTRUCTIONS
Your xray was preliminarily read by your provider.  A radiologist will read the xray and you will be notified if it is abnormal.    You are to Rest, Ice, compression (ace wrap, splint) elevate  Do not remove the splint until you are instructed to do so.   Take tylenol or motrin as needed.  You are to see your PCP   Go to the ED if symptoms worsen.    You are to follow up with orthopedics as scheduled - you do have a referral

## 2024-11-15 NOTE — PROGRESS NOTES
[facility]        NAME: Andree Kwan is a 34 y.o. female  : 1990    MRN: [id number]  DATE: November 15, 2024  TIME: 10:15 AM    Assessment and Plan   Wrist strain, right, initial encounter [S66.911A]  1. Wrist strain, right, initial encounter  Ambulatory Referral to Orthopedic Surgery      2. Acute pain of right wrist  XR wrist 3+ vw right    Ambulatory Referral to Orthopedic Surgery            Patient Instructions       Follow up with PCP in 3-5 days.  Proceed to  ER if symptoms worsen.    If tests have been performed at [facility], our office will contact you with results if changes need to be made to the care plan discussed with you at the visit.  You can review your full results on [facility] MyChart.    Your xray was preliminarily read by your provider.  A radiologist will read the xray and you will be notified if it is abnormal.    You are to Rest, Ice, compression (ace wrap, splint) elevate  Do not remove the splint until you are instructed to do so.   Take tylenol or motrin as needed.  You are to see your PCP   Go to the ED if symptoms worsen.    You are to follow up with orthopedics as scheduled - you do have a referral            Chief Complaint     Chief Complaint   Patient presents with    Wrist Pain     Right wrist pain after playing volleyball 3 days ago         History of Present Illness       This is a 34 year old female who states 3 days ago was playing volleyball with her son and states she was trying to keep up with him and feels she over did it and now has right wrist pain. She states she is right handed so was hitting the ball off her clenched hand at the thumb area, hitting the ball with overhand and underhand.  She is having pain at the lateral wrist.  She states she has pain with all movement.  Had been alternating ice and heat and taking ibuprofen w/o relief.  She denies pregnancy and having an orthopedist.  PMH is listed and reviewed.         Review of Systems    Review of Systems   Constitutional: Negative.    HENT: Negative.     Eyes: Negative.    Respiratory: Negative.     Cardiovascular: Negative.    Gastrointestinal: Negative.    Endocrine: Negative.    Genitourinary: Negative.    Musculoskeletal:         Right wrist pain      Skin: Negative.    Allergic/Immunologic: Negative.    Neurological: Negative.    Hematological: Negative.    Psychiatric/Behavioral: Negative.           Current Medications       Current Outpatient Medications:     omeprazole (PriLOSEC) 40 MG capsule, Take 1 capsule (40 mg total) by mouth daily, Disp: 60 capsule, Rfl: 5    famotidine (PEPCID) 40 MG tablet, Take 1 tablet (40 mg total) by mouth daily at bedtime Take in evening 2 hours prior to bed (Patient not taking: Reported on 11/15/2024), Disp: 90 tablet, Rfl: 3    Current Allergies     Allergies as of 11/15/2024    (No Known Allergies)            The following portions of the patient's history were reviewed and updated as appropriate: allergies, current medications, past family history, past medical history, past social history, past surgical history and problem list.     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;  Service: Obstetrics    [place] LAPS SURG CHOLECYSTECTOMY W/CHOLANGIOGRAPHY N/A 2020    Procedure: LAPAROSCOPIC CHOLECYSTECTOMY WITH INTRAOP CHOLANGIOGRAM;  Surgeon: Hussain Grove MD;  Location: [facility];  Service: General       Family History   Problem Relation Age of Onset    Ellsinore's disease Family     Ellsinore's disease Mother   "   No Known Problems Father     No Known Problems Sister     No Known Problems Brother          Medications have been verified.        Objective   /70   Pulse 78   Temp 98.2 °F (36.8 °C) (Temporal)   Resp 18   Ht 5' 6\" (1.676 m)   Wt 113 kg (250 lb)   SpO2 100%   BMI 40.35 kg/m²   No LMP recorded.       Physical Exam     Physical Exam  Vitals and nursing note reviewed.   Constitutional:       General: She is not in acute distress.     Appearance: Normal appearance. She is obese. She is not ill-appearing, toxic-appearing or diaphoretic.   HENT:      Head: Normocephalic and atraumatic.      Nose: Nose normal.      Mouth/Throat:      Mouth: Mucous membranes are moist.   Eyes:      Extraocular Movements: Extraocular movements intact.   Cardiovascular:      Rate and Rhythm: Normal rate.      Pulses: Normal pulses.   Pulmonary:      Effort: Pulmonary effort is normal.   Musculoskeletal:         General: Tenderness and signs of injury present. No swelling or deformity.      Cervical back: Normal range of motion.      Comments: Right hand:  no swelling noted. No TTP or deformity  Right wrist.  TTP lateral distal ulna - no deformity, bruising.  LROM dorsal and nowak flexion, movement left to right.  NVI sensation intact.    Skin:     General: Skin is warm and dry.      Capillary Refill: Capillary refill takes less than 2 seconds.   Neurological:      General: No focal deficit present.      Mental Status: She is alert and oriented to person, place, and time.   Psychiatric:         Mood and Affect: Mood normal.         Behavior: Behavior normal.         Thought Content: Thought content normal.         Judgment: Judgment normal.       Preliminary reading right wrist xray  No acute osseous abnormality seen  Waiting on rad read      Orthopedic injury treatment    Date/Time: 11/15/2024 10:08 AM    Performed by: KANDY Graves  Authorized by: KANDY Graves    Patient Location:  " "Clinic  Universal Protocol:  procedure performed by consultantConsent: The procedure was performed in an emergent situation. Verbal consent obtained. Written consent obtained.  Risks and benefits: risks, benefits and alternatives were discussed  Consent given by: patient  Time out: Immediately prior to procedure a \"time out\" was called to verify the correct patient, procedure, equipment, support staff and site/side marked as required.  Timeout called at: 11/15/2024 10:08 AM.  Patient understanding: patient states understanding of the procedure being performed  Patient consent: the patient's understanding of the procedure matches consent given  Procedure consent: procedure consent matches procedure scheduled  Relevant documents: relevant documents present and verified  Test results: test results available and properly labeled  Site marked: the operative site was marked  Radiology Images displayed and confirmed. If images not available, report reviewed: imaging studies available  Required items: required blood products, implants, devices, and special equipment available  Patient identity confirmed: verbally with patient and hospital-assigned identification number    Injury location:  Wrist  Location details:  Right wrist  Injury type:  Soft tissue  Neurovascular status: Neurovascularly intact    Distal perfusion: normal    Neurological function: normal    Range of motion: reduced    Immobilization:  Other (comment) (velcro wrist splint)  Neurovascular status: Neurovascularly intact    Distal perfusion: normal    Neurological function: normal    Range of motion: unchanged    Patient tolerance:  Patient tolerated the procedure well with no immediate complications                "

## 2024-11-20 ENCOUNTER — HOSPITAL ENCOUNTER (OUTPATIENT)
Dept: GASTROENTEROLOGY | Facility: HOSPITAL | Age: 34
Setting detail: OUTPATIENT SURGERY
Discharge: HOME | End: 2024-11-20
Attending: INTERNAL MEDICINE
Payer: COMMERCIAL

## 2024-11-20 ENCOUNTER — ANESTHESIA EVENT (OUTPATIENT)
Dept: GASTROENTEROLOGY | Facility: HOSPITAL | Age: 34
End: 2024-11-20
Payer: COMMERCIAL

## 2024-11-20 ENCOUNTER — ANESTHESIA (OUTPATIENT)
Dept: GASTROENTEROLOGY | Facility: HOSPITAL | Age: 34
End: 2024-11-20
Payer: COMMERCIAL

## 2024-11-20 VITALS
OXYGEN SATURATION: 97 % | HEART RATE: 79 BPM | TEMPERATURE: 97.6 F | RESPIRATION RATE: 18 BRPM | SYSTOLIC BLOOD PRESSURE: 122 MMHG | DIASTOLIC BLOOD PRESSURE: 60 MMHG

## 2024-11-20 DIAGNOSIS — K22.9 SUBEPITHELIAL LESION OF ESOPHAGUS: ICD-10-CM

## 2024-11-20 DIAGNOSIS — R10.13 EPIGASTRIC DISCOMFORT: ICD-10-CM

## 2024-11-20 DIAGNOSIS — K21.00 GASTROESOPHAGEAL REFLUX DISEASE WITH ESOPHAGITIS WITHOUT HEMORRHAGE: ICD-10-CM

## 2024-11-20 LAB
EXT PREGNANCY TEST URINE: NEGATIVE
EXT. CONTROL: NORMAL

## 2024-11-20 PROCEDURE — 88305 TISSUE EXAM BY PATHOLOGIST: CPT | Performed by: PATHOLOGY

## 2024-11-20 PROCEDURE — 43239 EGD BIOPSY SINGLE/MULTIPLE: CPT | Performed by: INTERNAL MEDICINE

## 2024-11-20 PROCEDURE — 81025 URINE PREGNANCY TEST: CPT | Performed by: STUDENT IN AN ORGANIZED HEALTH CARE EDUCATION/TRAINING PROGRAM

## 2024-11-20 RX ORDER — LIDOCAINE HYDROCHLORIDE 10 MG/ML
0.5 INJECTION, SOLUTION EPIDURAL; INFILTRATION; INTRACAUDAL; PERINEURAL ONCE AS NEEDED
Status: DISCONTINUED | OUTPATIENT
Start: 2024-11-20 | End: 2024-11-24 | Stop reason: HOSPADM

## 2024-11-20 RX ORDER — LIDOCAINE HYDROCHLORIDE 20 MG/ML
INJECTION, SOLUTION EPIDURAL; INFILTRATION; INTRACAUDAL; PERINEURAL AS NEEDED
Status: DISCONTINUED | OUTPATIENT
Start: 2024-11-20 | End: 2024-11-20

## 2024-11-20 RX ORDER — PROPOFOL 10 MG/ML
INJECTION, EMULSION INTRAVENOUS AS NEEDED
Status: DISCONTINUED | OUTPATIENT
Start: 2024-11-20 | End: 2024-11-20

## 2024-11-20 RX ORDER — SODIUM CHLORIDE, SODIUM LACTATE, POTASSIUM CHLORIDE, CALCIUM CHLORIDE 600; 310; 30; 20 MG/100ML; MG/100ML; MG/100ML; MG/100ML
125 INJECTION, SOLUTION INTRAVENOUS CONTINUOUS
Status: DISCONTINUED | OUTPATIENT
Start: 2024-11-20 | End: 2024-11-24 | Stop reason: HOSPADM

## 2024-11-20 RX ADMIN — LIDOCAINE HYDROCHLORIDE 100 MG: 20 INJECTION, SOLUTION EPIDURAL; INFILTRATION; INTRACAUDAL; PERINEURAL at 08:22

## 2024-11-20 RX ADMIN — PROPOFOL 50 MG: 10 INJECTION, EMULSION INTRAVENOUS at 08:35

## 2024-11-20 RX ADMIN — SODIUM CHLORIDE, SODIUM LACTATE, POTASSIUM CHLORIDE, AND CALCIUM CHLORIDE 125 ML/HR: .6; .31; .03; .02 INJECTION, SOLUTION INTRAVENOUS at 07:34

## 2024-11-20 RX ADMIN — PROPOFOL 50 MG: 10 INJECTION, EMULSION INTRAVENOUS at 08:27

## 2024-11-20 RX ADMIN — PROPOFOL 150 MG: 10 INJECTION, EMULSION INTRAVENOUS at 08:22

## 2024-11-20 NOTE — H&P
History and Physical - [facility]  Andree Kwan 34 y.o. female MRN: [id number]                  HPI: Andree Kwan is a 34 y.o. year old female who presents for EGD.  Please refer to office note dated 2024 for details of her medical history.  Patient does have GERD with history of grade C erosive esophagitis back in May 2021.  There was some improvement on omeprazole 40 mg daily in 2021 however she still had grade B erosive esophagitis.  Eventually omeprazole was 40 mg twice a day and in 2021 esophagitis was healed.  Patient had cut back or stop taking her omeprazole due to insurance issues.  She was instructed to resume omeprazole 40 mg twice a day in July she presents today for repeat EGD.       she did have an ultrasound of the abdomen was unremarkable other than postcholecystectomy.    Overall Andree reports that she is feeling better.  She is having less acid reflux and nocturnal symptoms on omeprazole 40 mg twice a day and famotidine 40 mg at bedtime.  She has also tried to alter her diet and is having less gas and bloating.  Denies any rectal bleeding or black stools.      REVIEW OF SYSTEMS: Per the HPI, and otherwise unremarkable.    Historical Information   Past Medical History:   Diagnosis Date    Anemia     GERD (gastroesophageal reflux disease)     Greg's disease (HCC) 2022    Post partum depression     Rash of genital area     started a few weeks ago, red     Past Surgical History:   Procedure Laterality Date    APPENDECTOMY       SECTION      CHOLECYSTECTOMY      [place]  DELIVERY ONLY N/A 2019    Procedure:  SECTION () REPEAT;  Surgeon: Neymar Vaca MD;  Location: [facility];  Service: Obstetrics    [place] LAPS SURG CHOLECYSTECTOMY W/CHOLANGIOGRAPHY N/A 2020    Procedure: LAPAROSCOPIC CHOLECYSTECTOMY WITH INTRAOP CHOLANGIOGRAM;  Surgeon: Hussain Grove MD;  Location: [facility]  OR;  Service: General     Social History   Social History     Substance and Sexual Activity   Alcohol Use No     Social History     Substance and Sexual Activity   Drug Use Not Currently     Social History     Tobacco Use   Smoking Status Former    Current packs/day: 0.00    Average packs/day: 1 pack/day for 5.0 years (5.0 ttl pk-yrs)    Types: Cigarettes    Start date: 2012    Quit date: 2017    Years since quittin.3   Smokeless Tobacco Never     Family History   Problem Relation Age of Onset    [place]qu[place]'s disease Family     Poquoson's disease Mother     No Known Problems Father     No Known Problems Sister     No Known Problems Brother        Meds/Allergies       Current Outpatient Medications:     famotidine (PEPCID) 40 MG tablet    omeprazole (PriLOSEC) 40 MG capsule    Current Facility-Administered Medications:     lactated ringers infusion, 125 mL/hr, Intravenous, Continuous, 125 mL/hr at 24 0734    lidocaine (PF) (XYLOCAINE-MPF) 1 % injection 0.5 mL, 0.5 mL, Infiltration, Once PRN    No Known Allergies    Objective     /73   Pulse 100   Temp 98.1 °F (36.7 °C) (Temporal)   Resp 18   LMP 10/30/2024 (Approximate)   SpO2 99%       PHYSICAL EXAM    Gen: NAD  Head: NCAT  CV: RRR  CHEST: Clear  ABD: soft, NT/ND  EXT: no edema      ASSESSMENT/PLAN:  This is a 34 y.o. year old female here for EGD, and she is stable and optimized for her procedure.

## 2024-11-20 NOTE — ANESTHESIA PREPROCEDURE EVALUATION
Procedure:  EGD    Relevant Problems   ANESTHESIA   (+) PONV (postoperative nausea and vomiting)      GI/HEPATIC   (+) Gastroesophageal reflux disease   (+) Hiatal hernia      HEMATOLOGY   (+) Iron deficiency anemia      MUSCULOSKELETAL   (+) Hiatal hernia   (+) Kanawha's disease (HCC)   (+) Neck muscle spasm      NEURO/PSYCH   (+) Chronic neck pain   (+) Kanawha's disease (HCC)   (+) Postpartum depression          Physical Exam    Airway    Mallampati score: II  TM Distance: >3 FB  Neck ROM: full     Dental        Cardiovascular  Cardiovascular exam normal    Pulmonary  Pulmonary exam normal     Other Findings  post-pubertal.      Anesthesia Plan  ASA Score- 2     Anesthesia Type- IV sedation with anesthesia with ASA Monitors.         Additional Monitors:     Airway Plan:            Plan Factors-Exercise tolerance (METS): >4 METS.    Chart reviewed.   Existing labs reviewed. Patient summary reviewed.    Patient is not a current smoker.  Patient did not smoke on day of surgery.            Induction- intravenous.    Postoperative Plan-         Informed Consent- Anesthetic plan and risks discussed with patient.  I personally reviewed this patient with the CRNA. Discussed and agreed on the Anesthesia Plan with the CRNA..

## 2024-11-20 NOTE — ANESTHESIA POSTPROCEDURE EVALUATION
Post-Op Assessment Note    Last Filed PACU Vitals:  Vitals Value Taken Time   Temp     Pulse 82    /64    Resp 22    SpO2 98        Modified Wyatt:  No data recorded

## 2024-11-20 NOTE — ANESTHESIA POSTPROCEDURE EVALUATION
Post-Op Assessment Note    CV Status:  Stable  Pain Score: 0    Pain management: adequate       Mental Status:  Sleepy   Hydration Status:  Euvolemic   PONV Controlled:  Controlled   Airway Patency:  Patent     Post Op Vitals Reviewed: Yes    No anethesia notable event occurred.    Staff: CRNA       Last Filed PACU Vitals:  Vitals Value Taken Time   Temp     Pulse     BP     Resp     SpO2         Modified Wyatt:  No data recorded

## 2024-11-25 ENCOUNTER — RESULTS FOLLOW-UP (OUTPATIENT)
Dept: GASTROENTEROLOGY | Facility: CLINIC | Age: 34
End: 2024-11-25

## 2024-11-25 DIAGNOSIS — K22.10 EROSIVE ESOPHAGITIS: ICD-10-CM

## 2024-11-25 PROCEDURE — 88305 TISSUE EXAM BY PATHOLOGIST: CPT | Performed by: PATHOLOGY

## 2024-11-26 DIAGNOSIS — K22.10 EROSIVE ESOPHAGITIS: ICD-10-CM

## 2024-11-26 DIAGNOSIS — K21.00 GASTROESOPHAGEAL REFLUX DISEASE WITH ESOPHAGITIS WITHOUT HEMORRHAGE: Primary | ICD-10-CM

## 2024-11-26 DIAGNOSIS — R10.13 EPIGASTRIC DISCOMFORT: ICD-10-CM

## 2024-11-26 RX ORDER — OMEPRAZOLE 40 MG/1
40 CAPSULE, DELAYED RELEASE ORAL
Qty: 180 CAPSULE | Refills: 3 | Status: SHIPPED | OUTPATIENT
Start: 2024-11-26

## 2024-11-26 RX ORDER — FAMOTIDINE 40 MG/1
40 TABLET, FILM COATED ORAL
Qty: 90 TABLET | Refills: 3 | Status: SHIPPED | OUTPATIENT
Start: 2024-11-26

## 2024-11-26 RX ORDER — OMEPRAZOLE 40 MG/1
40 CAPSULE, DELAYED RELEASE ORAL 2 TIMES DAILY
Qty: 180 CAPSULE | Refills: 1 | Status: CANCELLED | OUTPATIENT
Start: 2024-11-26

## 2024-11-26 NOTE — RESULT ENCOUNTER NOTE
Please inform Andree that biopsies from the esophagus were benign and negative for Warren's esophagus.  Keeping in mind that I was not able to take several biopsies of the esophagus as she was coughing during the examination despite anesthesia effort.  Biopsies stomach benign and negative for bacteria called H. pylori.  Remain on omeprazole 40 mg twice a day and famotidine 40 mg 2 hours before bed.  Follow-up in my office in about 6 months.  Please recall for repeat EGD for 1 year.  Thank you

## 2024-11-26 NOTE — TELEPHONE ENCOUNTER
----- Message from Stanley Narayan DO sent at 11/25/2024  7:46 PM EST -----  Please inform Andree that biopsies from the esophagus were benign and negative for Warren's esophagus.  Keeping in mind that I was not able to take several biopsies of the esophagus as she was coughing during the examination despite anesthesia effort.  Biopsies stomach benign and negative for bacteria called H. pylori.  Remain on omeprazole 40 mg twice a day and famotidine 40 mg 2 hours before bed.  Follow-up in my office in about 6 months.  Please recall for repeat EGD for 1 year.  Thank you

## 2024-11-26 NOTE — TELEPHONE ENCOUNTER
Spoke with patient reviewed results, scheduled follow up and put recall in for EGD. While on the phone the patient asked for her omeprazole to be refilled. She does take 40mg twice a day per result notes below.  Would like a 90day script sent to [facility]. Script ready to go.

## 2025-04-11 DIAGNOSIS — R94.6 ABNORMAL RESULTS OF THYROID FUNCTION STUDIES: ICD-10-CM

## 2025-04-11 DIAGNOSIS — E78.00 PURE HYPERCHOLESTEROLEMIA: ICD-10-CM

## 2025-04-11 DIAGNOSIS — E55.9 VITAMIN D DEFICIENCY: ICD-10-CM

## 2025-04-11 DIAGNOSIS — R73.01 IMPAIRED FASTING GLUCOSE: ICD-10-CM

## 2025-04-11 DIAGNOSIS — R73.03 PREDIABETES: ICD-10-CM

## 2025-04-11 DIAGNOSIS — E03.9 ACQUIRED HYPOTHYROIDISM: ICD-10-CM

## 2025-04-11 DIAGNOSIS — Z00.00 ANNUAL PHYSICAL EXAM: Primary | ICD-10-CM

## 2025-04-11 DIAGNOSIS — Z79.899 ON LONG TERM DRUG THERAPY: ICD-10-CM

## 2025-04-14 ENCOUNTER — VBI (OUTPATIENT)
Dept: ADMINISTRATIVE | Facility: OTHER | Age: 35
End: 2025-04-14

## 2025-04-14 NOTE — TELEPHONE ENCOUNTER
04/14/25 8:51 AM     Chart reviewed for Pap Smear (HPV) aka Cervical Cancer Screening was/were not submitted to the patient's insurance.     Laura Irby MA   [facility]

## 2025-05-19 ENCOUNTER — OFFICE VISIT (OUTPATIENT)
Dept: FAMILY MEDICINE CLINIC | Facility: CLINIC | Age: 35
End: 2025-05-19
Payer: COMMERCIAL

## 2025-05-19 VITALS
RESPIRATION RATE: 18 BRPM | TEMPERATURE: 97.2 F | OXYGEN SATURATION: 98 % | SYSTOLIC BLOOD PRESSURE: 126 MMHG | DIASTOLIC BLOOD PRESSURE: 82 MMHG | WEIGHT: 266.6 LBS | HEIGHT: 66 IN | HEART RATE: 78 BPM | BODY MASS INDEX: 42.85 KG/M2

## 2025-05-19 DIAGNOSIS — Z80.3 FAMILY HISTORY OF BREAST CANCER: ICD-10-CM

## 2025-05-19 DIAGNOSIS — K21.00 GASTROESOPHAGEAL REFLUX DISEASE WITH ESOPHAGITIS WITHOUT HEMORRHAGE: ICD-10-CM

## 2025-05-19 DIAGNOSIS — Z00.00 ANNUAL PHYSICAL EXAM: ICD-10-CM

## 2025-05-19 DIAGNOSIS — G10 HUNTINGTON'S DISEASE (HCC): ICD-10-CM

## 2025-05-19 DIAGNOSIS — E66.813 CLASS 3 SEVERE OBESITY DUE TO EXCESS CALORIES WITH SERIOUS COMORBIDITY AND BODY MASS INDEX (BMI) OF 40.0 TO 44.9 IN ADULT: ICD-10-CM

## 2025-05-19 DIAGNOSIS — B37.2 CANDIDAL INTERTRIGO: Primary | ICD-10-CM

## 2025-05-19 PROCEDURE — 99395 PREV VISIT EST AGE 18-39: CPT

## 2025-05-19 RX ORDER — CLOTRIMAZOLE 1 %
CREAM (GRAM) TOPICAL 2 TIMES DAILY
Qty: 60 G | Refills: 1 | Status: SHIPPED | OUTPATIENT
Start: 2025-05-19

## 2025-05-19 RX ORDER — ESCITALOPRAM OXALATE 10 MG/1
10 TABLET ORAL DAILY
COMMUNITY
Start: 2025-02-18

## 2025-05-19 NOTE — PROGRESS NOTES
Adult Annual Physical  Name: Andree Kwan      : 1990      MRN: [id number]  Encounter Provider: Esther Back PA-C  Encounter Date: 2025   Encounter department: [facility]    :  Assessment & Plan  Annual physical exam  Immunizations and preventive care screenings were discussed with patient today.   Appropriate education was printed on patient's after visit summary.  Patient presents for routine physical.   Physical exam unremarkable. BP WNL.   No chronic medical conditions; takes no daily medications.        Candidal intertrigo  Patient states that she typically gets an itchy red rash underneath both of her breasts, especially when it is warm out.  Patient has been using clotrimazole cream from her gynecologist in which she is running out of.  Will refill  Orders:    clotrimazole (LOTRIMIN) 1 % cream; Apply topically 2 (two) times a day    Family history of breast cancer  Patient states her maternal aunt and grandmother both had breast cancer.  Patient was wondering if she should get a mammogram early.  Due to guidelines, patient would be in eligible for early breast cancer screening unless she had a first-degree relatives such as mother's sister with breast cancer.  Advised patient that we could do genetic testing to look for BRCA genes.  Patient denies at this time.  Patient currently not having any issues with her breast.  Patient denies any masses lumps, redness, nipple inversion, pain.       Class 3 severe obesity due to excess calories with serious comorbidity and body mass index (BMI) of 40.0 to 44.9 in adult  Patient states she would like a referral to weight management to help lose weight.  Patient states she does not want to go on medication to help her lose weight but would like to learn meal planning, exercise regimens.  Will order referral for      Orders:    Ambulatory Referral to Weight Management; Future    Gastroesophageal reflux disease with  esophagitis without hemorrhage  Stable on famotidine and omeprazole.       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  Patient's mother and grandmother both had Greg's disease.  Currently seeing [facility].  Currently not on treatment           Preventive Screenings:  - Diabetes Screening: screening up-to-date  - Cholesterol Screening: screening not indicated and has hyperlipidemia   - Hepatitis C screening: screening up-to-date   - HIV screening: screening up-to-date   - Cervical cancer screening: screening up-to-date and risks/benefits discussed   - Colon cancer screening: screening not indicated   - Lung cancer screening: screening not indicated     Counseling/Anticipatory Guidance:  - Alcohol: discussed moderation in alcohol intake and recommendations for healthy alcohol use.   - Drug use: discussed harms of illicit drug use and how it can negatively impact mental/physical health.   - Tobacco use: discussed harms of tobacco use and management options for quitting.   - Dental health: discussed importance of regular tooth brushing, flossing, and dental visits.   - Sexual health: discussed sexually transmitted diseases, partner selection, use of condoms, avoidance of unintended pregnancy, and contraceptive alternatives.   - Diet: discussed recommendations for a healthy/well-balanced diet.   - Exercise: the importance of regular exercise/physical activity was discussed. Recommend exercise 3-5 times per week for at least 30 minutes.   - Injury prevention: discussed safety/seat belts, safety helmets, smoke detectors, carbon monoxide detectors, and smoking near bedding or upholstery.          History of Present Illness     Adult Annual Physical:  Patient presents for annual physical. Patient is a 34-year-old female who presents today for annual physical.  Patient states and was wondering that if she should get a mammogram completed due to having a  "maternal grandmother and maternal aunt with breast cancer.  Patient states she does not have any masses, lumps, pain and that she has never had genetic testing done.  Patient also states that she does get chronic yeast infections of her skin underneath her breasts which she has been using clotrimazole cream for and will need a refill of it.  Patient denies any other acute concerns today.  Patient denies chest pain, shortness of breath, abdominal pain, changes in bowels.     Diet and Physical Activity:  - Diet/Nutrition: no special diet, frequent junk food and high fat diet.  - Exercise: no formal exercise.    General Health:  - Sleep: sleeps well and 7-8 hours of sleep on average.  - Hearing: normal hearing bilateral ears.  - Vision: wears glasses and most recent eye exam < 1 year ago.  - Dental: regular dental visits and brushes teeth twice daily.    /GYN Health:  - Follows with GYN: yes.   - History of STDs: no    Review of Systems   Constitutional:  Negative for chills, fatigue and fever.   HENT:  Negative for congestion, ear pain and sore throat.    Eyes:  Negative for pain.   Respiratory:  Negative for cough and shortness of breath.    Cardiovascular:  Negative for chest pain and palpitations.   Gastrointestinal:  Negative for abdominal pain, constipation, diarrhea, nausea and vomiting.   Genitourinary:  Negative for dysuria and hematuria.   Musculoskeletal:  Negative for arthralgias and back pain.   Skin:  Positive for rash. Negative for color change.   Neurological:  Negative for syncope, numbness and headaches.   All other systems reviewed and are negative.    Medications Ordered Prior to Encounter[1]     Objective   /82 (Patient Position: Sitting, Cuff Size: Large)   Pulse 78   Temp (!) 97.2 °F (36.2 °C) (Tympanic)   Resp 18   Ht 5' 6\" (1.676 m)   Wt 121 kg (266 lb 9.6 oz)   SpO2 98%   BMI 43.03 kg/m²     Physical Exam  Vitals and nursing note reviewed.   Constitutional:       General: She is " not in acute distress.     Appearance: Normal appearance. She is well-developed.   HENT:      Head: Normocephalic and atraumatic.      Right Ear: Tympanic membrane normal.      Left Ear: Tympanic membrane normal.      Nose: Nose normal.      Mouth/Throat:      Mouth: Mucous membranes are moist.     Eyes:      Conjunctiva/sclera: Conjunctivae normal.       Cardiovascular:      Rate and Rhythm: Normal rate and regular rhythm.      Heart sounds: Normal heart sounds. No murmur heard.  Pulmonary:      Effort: Pulmonary effort is normal. No respiratory distress.      Breath sounds: Normal breath sounds. No wheezing or rhonchi.   Abdominal:      Palpations: Abdomen is soft.      Tenderness: There is no abdominal tenderness.     Musculoskeletal:         General: No swelling.      Cervical back: Neck supple.     Skin:     General: Skin is warm and dry.      Capillary Refill: Capillary refill takes less than 2 seconds.     Neurological:      Mental Status: She is alert and oriented to person, place, and time.     Psychiatric:         Mood and Affect: Mood normal.         Behavior: Behavior normal.         Thought Content: Thought content normal.         Judgment: Judgment normal.       Administrative Statements   I have spent a total time of 35 minutes in caring for this patient on the day of the visit/encounter including Diagnostic results, Prognosis, Risks and benefits of tx options, Instructions for management, Patient and family education, Importance of tx compliance, Risk factor reductions, Impressions, Counseling / Coordination of care, Documenting in the medical record, Reviewing/placing orders in the medical record (including tests, medications, and/or procedures), and Obtaining or reviewing history  .         [1]   Current Outpatient Medications on File Prior to Visit   Medication Sig Dispense Refill    escitalopram (LEXAPRO) 10 mg tablet Take 10 mg by mouth daily      famotidine (PEPCID) 40 MG tablet Take 1 tablet  (40 mg total) by mouth daily at bedtime Take in evening 2 hours prior to bed 90 tablet 3    omeprazole (PriLOSEC) 40 MG capsule Take 1 capsule (40 mg total) by mouth 2 (two) times a day before meals 180 capsule 3     No current facility-administered medications on file prior to visit.

## 2025-05-19 NOTE — ASSESSMENT & PLAN NOTE
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  Patient's mother and grandmother both had Jeffrey's disease.  Currently seeing [facility].  Currently not on treatment

## 2025-05-30 ENCOUNTER — APPOINTMENT (OUTPATIENT)
Dept: LAB | Facility: CLINIC | Age: 35
End: 2025-05-30
Payer: COMMERCIAL

## 2025-05-30 DIAGNOSIS — E78.00 PURE HYPERCHOLESTEROLEMIA: ICD-10-CM

## 2025-05-30 DIAGNOSIS — E55.9 VITAMIN D DEFICIENCY: ICD-10-CM

## 2025-05-30 DIAGNOSIS — R73.03 PREDIABETES: ICD-10-CM

## 2025-05-30 DIAGNOSIS — R73.01 IMPAIRED FASTING GLUCOSE: ICD-10-CM

## 2025-05-30 DIAGNOSIS — E03.9 ACQUIRED HYPOTHYROIDISM: ICD-10-CM

## 2025-05-30 DIAGNOSIS — Z00.00 ANNUAL PHYSICAL EXAM: ICD-10-CM

## 2025-05-30 DIAGNOSIS — Z79.899 ON LONG TERM DRUG THERAPY: ICD-10-CM

## 2025-05-30 DIAGNOSIS — R94.6 ABNORMAL RESULTS OF THYROID FUNCTION STUDIES: ICD-10-CM

## 2025-05-30 LAB
25(OH)D3 SERPL-MCNC: 16.9 NG/ML (ref 30–100)
ALBUMIN SERPL BCG-MCNC: 4.4 G/DL (ref 3.5–5)
ALP SERPL-CCNC: 63 U/L (ref 34–104)
ALT SERPL W P-5'-P-CCNC: 26 U/L (ref 7–52)
ANION GAP SERPL CALCULATED.3IONS-SCNC: 8 MMOL/L (ref 4–13)
AST SERPL W P-5'-P-CCNC: 22 U/L (ref 13–39)
BASOPHILS # BLD AUTO: 0.01 THOUSANDS/ÂΜL (ref 0–0.1)
BASOPHILS NFR BLD AUTO: 0 % (ref 0–1)
BILIRUB SERPL-MCNC: 0.35 MG/DL (ref 0.2–1)
BUN SERPL-MCNC: 15 MG/DL (ref 5–25)
CALCIUM SERPL-MCNC: 9.4 MG/DL (ref 8.4–10.2)
CHLORIDE SERPL-SCNC: 103 MMOL/L (ref 96–108)
CHOLEST SERPL-MCNC: 163 MG/DL (ref ?–200)
CO2 SERPL-SCNC: 27 MMOL/L (ref 21–32)
CREAT SERPL-MCNC: 0.65 MG/DL (ref 0.6–1.3)
EOSINOPHIL # BLD AUTO: 0.18 THOUSAND/ÂΜL (ref 0–0.61)
EOSINOPHIL NFR BLD AUTO: 4 % (ref 0–6)
ERYTHROCYTE [DISTWIDTH] IN BLOOD BY AUTOMATED COUNT: 12.6 % (ref 11.6–15.1)
EST. AVERAGE GLUCOSE BLD GHB EST-MCNC: 117 MG/DL
GFR SERPL CREATININE-BSD FRML MDRD: 115 ML/MIN/1.73SQ M
GLUCOSE P FAST SERPL-MCNC: 100 MG/DL (ref 65–99)
HBA1C MFR BLD: 5.7 %
HCT VFR BLD AUTO: 39.5 % (ref 34.8–46.1)
HDLC SERPL-MCNC: 56 MG/DL
HGB BLD-MCNC: 13.1 G/DL (ref 11.5–15.4)
IMM GRANULOCYTES # BLD AUTO: 0.02 THOUSAND/UL (ref 0–0.2)
IMM GRANULOCYTES NFR BLD AUTO: 0 % (ref 0–2)
LDLC SERPL CALC-MCNC: 91 MG/DL (ref 0–100)
LYMPHOCYTES # BLD AUTO: 1.46 THOUSANDS/ÂΜL (ref 0.6–4.47)
LYMPHOCYTES NFR BLD AUTO: 33 % (ref 14–44)
MCH RBC QN AUTO: 28.2 PG (ref 26.8–34.3)
MCHC RBC AUTO-ENTMCNC: 33.2 G/DL (ref 31.4–37.4)
MCV RBC AUTO: 85 FL (ref 82–98)
MONOCYTES # BLD AUTO: 0.36 THOUSAND/ÂΜL (ref 0.17–1.22)
MONOCYTES NFR BLD AUTO: 8 % (ref 4–12)
NEUTROPHILS # BLD AUTO: 2.45 THOUSANDS/ÂΜL (ref 1.85–7.62)
NEUTS SEG NFR BLD AUTO: 55 % (ref 43–75)
NRBC BLD AUTO-RTO: 0 /100 WBCS
PLATELET # BLD AUTO: 306 THOUSANDS/UL (ref 149–390)
PMV BLD AUTO: 8.8 FL (ref 8.9–12.7)
POTASSIUM SERPL-SCNC: 3.9 MMOL/L (ref 3.5–5.3)
PROT SERPL-MCNC: 7.6 G/DL (ref 6.4–8.4)
RBC # BLD AUTO: 4.65 MILLION/UL (ref 3.81–5.12)
SODIUM SERPL-SCNC: 138 MMOL/L (ref 135–147)
TRIGL SERPL-MCNC: 79 MG/DL (ref ?–150)
TSH SERPL DL<=0.05 MIU/L-ACNC: 1.29 UIU/ML (ref 0.45–4.5)
WBC # BLD AUTO: 4.48 THOUSAND/UL (ref 4.31–10.16)

## 2025-05-30 PROCEDURE — 80053 COMPREHEN METABOLIC PANEL: CPT

## 2025-05-30 PROCEDURE — 80061 LIPID PANEL: CPT

## 2025-05-30 PROCEDURE — 83036 HEMOGLOBIN GLYCOSYLATED A1C: CPT

## 2025-05-30 PROCEDURE — 84443 ASSAY THYROID STIM HORMONE: CPT

## 2025-05-30 PROCEDURE — 36415 COLL VENOUS BLD VENIPUNCTURE: CPT

## 2025-05-30 PROCEDURE — 82306 VITAMIN D 25 HYDROXY: CPT

## 2025-05-30 PROCEDURE — 85025 COMPLETE CBC W/AUTO DIFF WBC: CPT

## 2025-06-04 ENCOUNTER — RESULTS FOLLOW-UP (OUTPATIENT)
Dept: FAMILY MEDICINE CLINIC | Facility: CLINIC | Age: 35
End: 2025-06-04

## 2025-06-04 DIAGNOSIS — E55.9 VITAMIN D DEFICIENCY: Primary | ICD-10-CM

## 2025-06-04 RX ORDER — ERGOCALCIFEROL 1.25 MG/1
50000 CAPSULE, LIQUID FILLED ORAL WEEKLY
Qty: 12 CAPSULE | Refills: 0 | Status: SHIPPED | OUTPATIENT
Start: 2025-06-04

## 2025-06-07 ENCOUNTER — DOCUMENTATION (OUTPATIENT)
Dept: GASTROENTEROLOGY | Facility: CLINIC | Age: 35
End: 2025-06-07

## 2025-06-07 NOTE — PROGRESS NOTES
Records reviewed and outlined below in preparation for office visit 6/9/2025;However patient did not show for her appointment, this information will be saved for future visit    5/30/2025 laboratory data  Sodium 138  Potassium 3.9  BUN 15 & creatinine 0.65  Glucose 100  AST 22  ALT 26  Albumin 4.4  T. bili 0.35  Alk phos 63  Cholesterol 163  Triglycerides 79  HDL 56  LDL 91  Vitamin D low at 16.9  WBC 4.48 Hgb 13.1 HCT 39.5 MCV 85 platelet count 306,000  Hemoglobin A1c 5.7  TSH 1.295    11/20/2024 EGD  The duodenal bulb and 2nd part of the duodenum appeared normal.  Moderate striped erythematous mucosa in the antrum possible GAVE versus variation of normal.; performed cold forceps biopsies  3 cm sliding hiatal hernia  Irregular Z-line at 36 cm with 2 tongues of salmon-colored mucosa.; performed cold forceps biopsies  Mildly torturous distal third of the esophagus  Tachypnea throughout procedure limiting the ability to biopsy the esophagus  Repeat EGD in 1 year recommended.  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  Biopsy negative for H. pylori  2 cm sliding hiatal hernia with possible very small paraesophageal component  Irregular Z-line at 34 cm with 2 short tongues of salmon-colored mucosa status post biopsy rule out Warren's esophagus.  Biopsy distal esophagus did reveal mild subacute and chronic inflammation.  Eosinophils focally number up to 20 per high-power  field.  Small focus equivocal for intestinal metaplasia  Prominent fold versus submucosal lesion at the GE junction at 34 cm.  This was soft when probed.  Not biopsied   Mildly torturous distal 3rd of the esophagus  Continue omeprazole 40 mg twice a day.  Follow-up in 3 months  Consider upper GI examination for further evaluation if symptoms persist  EGD with endoscopic ultrasound nonurgently recommended to evaluate submucosal lesion distal esophagus     8/16/2021 EGD     Normal 2nd portion duodenum  Few small erosions duodenal bulb  Mild antral erythema status post biopsy antrum and body of stomach to rule out H pylori.  Biopsy negative for H. pylori.  2-3 cm sliding hiatal hernia  Mildly prominent/nodular fold at GE junction likely inflammatory status post biopsy.  Biopsy revealed benign squamous mucosa with chronic inflammation and reactive epithelial changes.  Negative for eosinophilic esophagitis  LA grade B erosive esophagitis  Omeprazole was increased to 40 mg twice a day at this time.     5/10/2021 EGD  Normal duodenum to the 2nd portion status post biopsy rule out celiac disease.  Biopsy negative for celiac disease.  Mild patchy antral erythema status post biopsy rule out H pylori.  Biopsy revealed mildly chronic inactive gastritis  Small 2 cm the 3 cm sliding hiatal hernia  LA grade C erosive esophagitis located 33 cm  Irregular Z-line at 33 cm      Omeprazole was increased to 40 mg daily at that time

## 2025-07-17 NOTE — PATIENT INSTRUCTIONS

## (undated) DEVICE — ADHESIVE SKIN HIGH VISCOSITY EXOFIN 1ML

## (undated) DEVICE — SYRINGE 20ML LL

## (undated) DEVICE — GLOVE SRG BIOGEL 7.5

## (undated) DEVICE — GRASPER ATRAUMATIC FEN 5MM X 33CM FIXED THUMB LOOP GENI-SURGE

## (undated) DEVICE — CHLORAPREP HI-LITE 26ML ORANGE

## (undated) DEVICE — ADHESIVE SKIN HIGH VISCOSITY EXOFIN MICRO 0.5ML

## (undated) DEVICE — DRAPE C-ARM X-RAY

## (undated) DEVICE — GARMENT,MEDLINE,DVT,INT,CALF,FOAM,MED: Brand: MEDLINE

## (undated) DEVICE — ABG MICROSTICKS SAFETY

## (undated) DEVICE — GLOVE INDICATOR PI UNDERGLOVE SZ 7.5 BLUE

## (undated) DEVICE — SUT VICRYL 0 UR-6 27 IN J603H

## (undated) DEVICE — SCD SEQUENTIAL COMPRESSION COMFORT SLEEVE MEDIUM KNEE LENGTH: Brand: KENDALL SCD

## (undated) DEVICE — GAUZE SPONGES,8 PLY: Brand: CURITY

## (undated) DEVICE — NEEDLE 25G X 1 1/2

## (undated) DEVICE — WOUND RETRACTOR AND PROTECTOR: Brand: ALEXIS O WOUND PROTECTOR-RETRACTOR

## (undated) DEVICE — 3M™ TEGADERM™ TRANSPARENT FILM DRESSING FRAME STYLE, 1626W, 4 IN X 4-3/4 IN (10 CM X 12 CM), 50/CT 4CT/CASE: Brand: 3M™ TEGADERM™

## (undated) DEVICE — SUT PLAIN 3-0 CT-1 27 IN 842H

## (undated) DEVICE — ASTOUND IMPERVIOUS SURGICAL GOWN: Brand: CONVERTORS

## (undated) DEVICE — [HIGH FLOW INSUFFLATOR,  DO NOT USE IF PACKAGE IS DAMAGED,  KEEP DRY,  KEEP AWAY FROM SUNLIGHT,  PROTECT FROM HEAT AND RADIOACTIVE SOURCES.]: Brand: PNEUMOSURE

## (undated) DEVICE — GLOVE SRG BIOGEL 7

## (undated) DEVICE — TROCAR: Brand: KII FIOS FIRST ENTRY

## (undated) DEVICE — SUT MONOCRYL 4-0 PS-2 27 IN Y426H

## (undated) DEVICE — CLIP ENDO 5MM M/L

## (undated) DEVICE — SPONGE GAUZE 2 X 2 4PLY STRL

## (undated) DEVICE — GLOVE SRG BIOGEL ECLIPSE 7

## (undated) DEVICE — LAPAROSCOPIC SCISSORS: Brand: EPIX LAPAROSCOPIC SCISSORS

## (undated) DEVICE — PACK C-SECTION PBDS

## (undated) DEVICE — 3M™ TEGADERM™ TRANSPARENT FILM DRESSING FRAME STYLE, 1624W, 2-3/8 IN X 2-3/4 IN (6 CM X 7 CM), 100/CT 4CT/CASE: Brand: 3M™ TEGADERM™

## (undated) DEVICE — HARMONIC 1100 SHEARS, 36CM SHAFT LENGTH: Brand: HARMONIC

## (undated) DEVICE — ENDOPATH XCEL BLADELESS TROCARS WITH STABILITY SLEEVES: Brand: ENDOPATH XCEL

## (undated) DEVICE — ENDOPOUCH RETRIEVER SPECIMEN RETRIEVAL BAGS: Brand: ENDOPOUCH RETRIEVER

## (undated) DEVICE — INTENDED FOR TISSUE SEPARATION, AND OTHER PROCEDURES THAT REQUIRE A SHARP SURGICAL BLADE TO PUNCTURE OR CUT.: Brand: BARD-PARKER ® CARBON RIB-BACK BLADES

## (undated) DEVICE — GRASPER GATOR MINILAP

## (undated) DEVICE — TAUT CATH INTRODUCER 4.5 FR

## (undated) DEVICE — TROCARS: Brand: KII® BALLOON BLUNT TIP SYSTEM

## (undated) DEVICE — SUT VICRYL 0 CT 36 IN J958H

## (undated) DEVICE — 5 MM CURVED DISSECTORS WITH MONOPOLAR CAUTERY: Brand: ENDOPATH